# Patient Record
Sex: MALE | NOT HISPANIC OR LATINO
[De-identification: names, ages, dates, MRNs, and addresses within clinical notes are randomized per-mention and may not be internally consistent; named-entity substitution may affect disease eponyms.]

---

## 2017-11-16 ENCOUNTER — PRIOR ORIGINAL RECORDS (OUTPATIENT)
Dept: OTHER | Age: 77
End: 2017-11-16

## 2017-12-13 ENCOUNTER — HOSPITAL ENCOUNTER (OUTPATIENT)
Age: 77
Discharge: HOME | End: 2017-12-13
Attending: EMERGENCY MEDICINE
Payer: MEDICARE

## 2017-12-13 ENCOUNTER — APPOINTMENT (OUTPATIENT)
Dept: GENERAL RADIOLOGY | Age: 77
End: 2017-12-13
Attending: EMERGENCY MEDICINE
Payer: MEDICARE

## 2017-12-13 VITALS
SYSTOLIC BLOOD PRESSURE: 171 MMHG | DIASTOLIC BLOOD PRESSURE: 77 MMHG | TEMPERATURE: 101 F | HEART RATE: 103 BPM | OXYGEN SATURATION: 95 % | RESPIRATION RATE: 16 BRPM

## 2017-12-13 DIAGNOSIS — R05.9 COUGH: Primary | ICD-10-CM

## 2017-12-13 DIAGNOSIS — R50.9 FEVER, UNSPECIFIED FEVER CAUSE: ICD-10-CM

## 2017-12-13 PROCEDURE — 99213 OFFICE O/P EST LOW 20 MIN: CPT

## 2017-12-13 PROCEDURE — 99214 OFFICE O/P EST MOD 30 MIN: CPT

## 2017-12-13 PROCEDURE — 71020 XR CHEST PA + LAT CHEST (CPT=71020): CPT | Performed by: EMERGENCY MEDICINE

## 2017-12-13 RX ORDER — LORATADINE 10 MG/1
10 TABLET ORAL DAILY
COMMUNITY
End: 2019-05-30

## 2017-12-13 RX ORDER — PNV NO.95/FERROUS FUM/FOLIC AC 28MG-0.8MG
325 TABLET ORAL DAILY
COMMUNITY

## 2017-12-13 RX ORDER — ASPIRIN 81 MG/1
81 TABLET, CHEWABLE ORAL DAILY
COMMUNITY

## 2017-12-13 RX ORDER — BENZONATATE 200 MG/1
200 CAPSULE ORAL 3 TIMES DAILY PRN
Qty: 30 CAPSULE | Refills: 0 | Status: SHIPPED | OUTPATIENT
Start: 2017-12-13 | End: 2018-01-12

## 2017-12-13 RX ORDER — AMLODIPINE BESYLATE 5 MG/1
5 TABLET ORAL DAILY
COMMUNITY
End: 2018-04-05

## 2017-12-13 RX ORDER — ACETAMINOPHEN 160 MG
2000 TABLET,DISINTEGRATING ORAL DAILY
COMMUNITY

## 2017-12-13 RX ORDER — BUPRENORPHINE HCL 8 MG/1
1 TABLET SUBLINGUAL DAILY
COMMUNITY

## 2017-12-13 RX ORDER — DOXYCYCLINE HYCLATE 100 MG
100 TABLET ORAL 2 TIMES DAILY
Qty: 20 TABLET | Refills: 0 | Status: SHIPPED | OUTPATIENT
Start: 2017-12-13 | End: 2017-12-23

## 2017-12-13 NOTE — ED INITIAL ASSESSMENT (HPI)
Patient c/o cough for a few weeks. States yesterday he had trouble sleeping and developed a fever. Pt temp at home a little over 100. Temp today is 101.2 on arrival. Pt has not had any of his medications yet today and will take tylenol when he gets home.

## 2017-12-13 NOTE — ED NOTES
Pt lying on cart. States he does not want tylenol right now. He wants to take it with the rest of his pills when he gets home. Patient states he took 6 extra strength tylenol yesterday and it didn't help.  Pt given blanket, water, and update on plan of care

## 2017-12-13 NOTE — ED PROVIDER NOTES
Patient presents with:  Cough/URI  Fever    HPI:     Golden Simental is a 78year old male with hx of HTN, DVT who presents with chief complaint of cough, fever. Started with URI symptoms a few days ago. The cough has progressed and now productive. 12/13/2017  PROCEDURE:  XR CHEST PA + LAT CHEST (CPT=71020)  INDICATIONS:  sinus congestion  COMPARISON:  [facility], CHEST W/O CONTRAST, 4/27/2010, 8:33. TECHNIQUE:  PA and lateral chest radiographs were obtained.   PATIENT STATED HISTOR tessalon Rx  2. Supportive care - APAP  3. F/u with PCP in 2 days for re-evaluation, sooner if symptoms worsen      All results reviewed and discussed with patient. See AVS for detailed discharge instructions.

## 2017-12-31 ENCOUNTER — PRIOR ORIGINAL RECORDS (OUTPATIENT)
Dept: OTHER | Age: 77
End: 2017-12-31

## 2018-01-25 ENCOUNTER — PRIOR ORIGINAL RECORDS (OUTPATIENT)
Dept: OTHER | Age: 78
End: 2018-01-25

## 2018-02-20 ENCOUNTER — TELEPHONE (OUTPATIENT)
Dept: INTERNAL MEDICINE CLINIC | Facility: CLINIC | Age: 78
End: 2018-02-20

## 2018-02-20 NOTE — TELEPHONE ENCOUNTER
Lo Clam over 65   Received: Yesterday   Message Contents   Jason Ko RN   Phone Number: [phone]             Patients spouse called on behalf of patient.  Patient would like to be seen for weight loss clinic   #[phone]

## 2018-02-23 ENCOUNTER — HOSPITAL ENCOUNTER (OUTPATIENT)
Dept: CV DIAGNOSTICS | Age: 78
Discharge: HOME | End: 2018-02-23
Attending: FAMILY MEDICINE
Payer: MEDICARE

## 2018-02-23 DIAGNOSIS — E78.5 HYPERLIPIDEMIA, UNSPECIFIED: ICD-10-CM

## 2018-02-23 DIAGNOSIS — I10 ESSENTIAL (PRIMARY) HYPERTENSION: ICD-10-CM

## 2018-02-23 PROCEDURE — 93306 TTE W/DOPPLER COMPLETE: CPT | Performed by: FAMILY MEDICINE

## 2018-04-05 ENCOUNTER — LAB ENCOUNTER (OUTPATIENT)
Dept: LAB | Age: 78
End: 2018-04-05
Attending: INTERNAL MEDICINE
Payer: MEDICARE

## 2018-04-05 ENCOUNTER — OFFICE VISIT (OUTPATIENT)
Dept: INTERNAL MEDICINE CLINIC | Facility: CLINIC | Age: 78
End: 2018-04-05

## 2018-04-05 VITALS
HEART RATE: 98 BPM | DIASTOLIC BLOOD PRESSURE: 62 MMHG | BODY MASS INDEX: 31.39 KG/M2 | HEIGHT: 67 IN | RESPIRATION RATE: 16 BRPM | SYSTOLIC BLOOD PRESSURE: 138 MMHG | WEIGHT: 200 LBS

## 2018-04-05 DIAGNOSIS — R73.01 IFG (IMPAIRED FASTING GLUCOSE): ICD-10-CM

## 2018-04-05 DIAGNOSIS — I10 ESSENTIAL HYPERTENSION: ICD-10-CM

## 2018-04-05 DIAGNOSIS — E66.9 OBESITY (BMI 30-39.9): ICD-10-CM

## 2018-04-05 DIAGNOSIS — Z51.81 THERAPEUTIC DRUG MONITORING: Primary | ICD-10-CM

## 2018-04-05 PROBLEM — E78.5 HLD (HYPERLIPIDEMIA): Status: ACTIVE | Noted: 2018-04-05

## 2018-04-05 PROCEDURE — 99203 OFFICE O/P NEW LOW 30 MIN: CPT | Performed by: INTERNAL MEDICINE

## 2018-04-05 PROCEDURE — 83036 HEMOGLOBIN GLYCOSYLATED A1C: CPT

## 2018-04-05 RX ORDER — HYDROCHLOROTHIAZIDE 25 MG/1
TABLET ORAL
Refills: 0 | COMMUNITY
Start: 2018-03-14 | End: 2019-04-08

## 2018-04-05 RX ORDER — LISINOPRIL 40 MG/1
TABLET ORAL
Refills: 0 | COMMUNITY
Start: 2018-01-22 | End: 2019-01-14

## 2018-04-05 RX ORDER — SIMVASTATIN 40 MG
TABLET ORAL
COMMUNITY
Start: 2013-01-03 | End: 2018-04-05

## 2018-04-05 RX ORDER — METFORMIN HYDROCHLORIDE 750 MG/1
750 TABLET, EXTENDED RELEASE ORAL DAILY
Qty: 30 TABLET | Refills: 0 | Status: SHIPPED | OUTPATIENT
Start: 2018-04-05 | End: 2018-04-30

## 2018-04-05 RX ORDER — AMLODIPINE BESYLATE 10 MG/1
TABLET ORAL
Refills: 0 | COMMUNITY
Start: 2018-02-26 | End: 2019-04-16

## 2018-04-05 NOTE — PATIENT INSTRUCTIONS
Plan:  Start metformin with dinner   Follow up with me in 1 month  Schedule follow up appointments: Ray Meyer (dietitian) & Mono Castro (behavorial psychologist)    Please try to work on the following dietary changes:  1.  Drink lots of water and cut beverly

## 2018-04-05 NOTE — PROGRESS NOTES
HISTORY OF PRESENT ILLNESS  Patient presents with:  Weight Problem      Jaqui Cisneros is a 78year old male new to our office today for initiation of medical weight loss program.  Patient presents today with c/o excess weight.      Has gained weight denies thickening   LUNGS: denies shortness of breath with exertion, no apnea   CARDIOVASCULAR: denies chest pain on exertion , denies palpitations or pedal edema  GI: denies abdominal pain.   No N/V/D/C  MUSCULOSKELETAL: denies joint pains   NEURO: denies TSH 1.060 04/06/2012     No results found for: B12, VITB12  No results found for: VITD, QVITD, VITD25, OFWW20PJ      Current Outpatient Prescriptions on File Prior to Visit:  gabapentin 300 MG Oral Cap TK ONE C PO TID Disp:  Rfl: 2   Omeprazole 40 MG Ora contraindications: topamax/ history of one renal stone 30 yrs ago. Avoid sympathomimetics due to age. · Follow up with dietitian and psychologist as recommended. · Discussed the role of sleep and stress in weight management.   · Labs orders as above, pat life.           Return in about 4 weeks (around 5/3/2018) for weight management. Patient verbalizes understanding.     Suzanne Brito MD  4/5/2018

## 2018-04-11 ENCOUNTER — TELEPHONE (OUTPATIENT)
Dept: INTERNAL MEDICINE CLINIC | Facility: CLINIC | Age: 78
End: 2018-04-11

## 2018-04-30 RX ORDER — METFORMIN HYDROCHLORIDE 750 MG/1
750 TABLET, EXTENDED RELEASE ORAL DAILY
Qty: 30 TABLET | Refills: 0 | Status: SHIPPED | OUTPATIENT
Start: 2018-04-30 | End: 2018-05-01

## 2018-04-30 NOTE — TELEPHONE ENCOUNTER
Maegan:pt wife called just wanted to let Calista Carcamo know that pt will be out of metformin this wednesday and not sure if he needs to get a refill,or partial refill, or just be without it until next ov

## 2018-04-30 NOTE — TELEPHONE ENCOUNTER
Requesting MetFORMIN HCl  MG Oral Tablet 24 Hr  LOV: 04/05/2018  RTC: 1 month  Filled: 04/05/2018 #30 with 0 refills    Future Appointments  Date Time Provider Sadia Webb   5/15/2018 2:00 PM Jeffrey Lovell MD [address]   5/15/2018 2:45

## 2018-05-01 RX ORDER — METFORMIN HYDROCHLORIDE 750 MG/1
TABLET, EXTENDED RELEASE ORAL
Qty: 30 TABLET | Refills: 0 | Status: SHIPPED | OUTPATIENT
Start: 2018-05-01 | End: 2018-05-15

## 2018-05-15 ENCOUNTER — OFFICE VISIT (OUTPATIENT)
Dept: INTERNAL MEDICINE CLINIC | Facility: CLINIC | Age: 78
End: 2018-05-15

## 2018-05-15 VITALS
DIASTOLIC BLOOD PRESSURE: 60 MMHG | BODY MASS INDEX: 30.92 KG/M2 | SYSTOLIC BLOOD PRESSURE: 110 MMHG | WEIGHT: 197 LBS | HEIGHT: 67 IN | HEART RATE: 80 BPM | RESPIRATION RATE: 16 BRPM

## 2018-05-15 DIAGNOSIS — Z51.81 THERAPEUTIC DRUG MONITORING: Primary | ICD-10-CM

## 2018-05-15 DIAGNOSIS — E66.9 OBESITY (BMI 30-39.9): ICD-10-CM

## 2018-05-15 DIAGNOSIS — R73.01 IFG (IMPAIRED FASTING GLUCOSE): ICD-10-CM

## 2018-05-15 PROCEDURE — 99213 OFFICE O/P EST LOW 20 MIN: CPT | Performed by: INTERNAL MEDICINE

## 2018-05-15 RX ORDER — METFORMIN HYDROCHLORIDE 750 MG/1
TABLET, EXTENDED RELEASE ORAL
Qty: 30 TABLET | Refills: 1 | Status: SHIPPED | OUTPATIENT
Start: 2018-05-15 | End: 2018-07-19

## 2018-05-15 NOTE — PROGRESS NOTES
HISTORY OF PRESENT ILLNESS  Patient presents with:  Weight Check: down 3      Ace Masterson is a 78year old male here for follow up in medical weight loss program.     Denies chest pain, shortness of breath, dizziness, blurred vision, headache, pa Results  Component Value Date   CHOLEST 127 04/06/2012   TRIG 136 04/06/2012   HDL 26 (L) 04/06/2012   LDL 74 04/06/2012   VLDL 27 04/06/2012   TCHDLRATIO 4.9 04/06/2012       Lab Results  Component Value Date   TSH 1.060 04/06/2012     No results found fo carb diet/ recommended to eat breakfast daily/ regular protein intake  · Medication use and side effects reviewed with patient. · Follow up with dietitian and psychologist as recommended. · Discussed the role of sleep and stress in weight management.   ·

## 2018-05-17 ENCOUNTER — OFFICE VISIT (OUTPATIENT)
Dept: INTERNAL MEDICINE CLINIC | Facility: CLINIC | Age: 78
End: 2018-05-17

## 2018-05-17 DIAGNOSIS — E66.09 CLASS 1 OBESITY DUE TO EXCESS CALORIES WITH SERIOUS COMORBIDITY AND BODY MASS INDEX (BMI) OF 30.0 TO 30.9 IN ADULT: Primary | ICD-10-CM

## 2018-05-17 PROCEDURE — G0447 BEHAVIOR COUNSEL OBESITY 15M: HCPCS | Performed by: DIETITIAN, REGISTERED

## 2018-05-19 NOTE — PROGRESS NOTES
INITIAL OUTPATIENT NUTRITION CONSULTATION    Nutrition Assessment    Medical Diagnosis: Obesity  Client Age and Gender: 78year old male     Marital Status and Occupation: . Works.   Did not state profession    Problem List as of 5/17/2018 Review mg/dL Final   ----------    LDL CHOLESTROL   Date Value Ref Range Status   04/06/2012 74 <100 mg/dL Final   ----------    HDL CHOL   Date Value Ref Range Status   04/06/2012 26 (L) mg/dL Final   Comment:   <26    mg/dL  Highest CHD risk  25-35  mg/dL  High 45 minutes in consultation with the patient. Nutrition Intervention/Education:  Comprehensive nutrition education and evaluation provided for weight loss. Diet is poor quality due to excessive bread, pastries, desserts, and soda.   Pt states he has re

## 2018-05-29 RX ORDER — METFORMIN HYDROCHLORIDE 750 MG/1
TABLET, EXTENDED RELEASE ORAL
Qty: 30 TABLET | Refills: 0 | Status: SHIPPED | OUTPATIENT
Start: 2018-05-29 | End: 2018-07-19

## 2018-07-19 ENCOUNTER — OFFICE VISIT (OUTPATIENT)
Dept: INTERNAL MEDICINE CLINIC | Facility: CLINIC | Age: 78
End: 2018-07-19
Payer: MEDICARE

## 2018-07-19 VITALS
SYSTOLIC BLOOD PRESSURE: 132 MMHG | HEART RATE: 80 BPM | HEIGHT: 67 IN | DIASTOLIC BLOOD PRESSURE: 68 MMHG | RESPIRATION RATE: 16 BRPM | BODY MASS INDEX: 28.88 KG/M2 | WEIGHT: 184 LBS

## 2018-07-19 DIAGNOSIS — E66.9 OBESITY (BMI 30-39.9): ICD-10-CM

## 2018-07-19 DIAGNOSIS — R73.01 IFG (IMPAIRED FASTING GLUCOSE): ICD-10-CM

## 2018-07-19 DIAGNOSIS — Z51.81 THERAPEUTIC DRUG MONITORING: Primary | ICD-10-CM

## 2018-07-19 DIAGNOSIS — I10 ESSENTIAL HYPERTENSION: ICD-10-CM

## 2018-07-19 PROCEDURE — 99214 OFFICE O/P EST MOD 30 MIN: CPT | Performed by: INTERNAL MEDICINE

## 2018-07-19 RX ORDER — METFORMIN HYDROCHLORIDE 750 MG/1
TABLET, EXTENDED RELEASE ORAL
Qty: 90 TABLET | Refills: 0 | Status: SHIPPED | OUTPATIENT
Start: 2018-07-19 | End: 2018-10-04

## 2018-07-19 RX ORDER — METFORMIN HYDROCHLORIDE 750 MG/1
TABLET, EXTENDED RELEASE ORAL
Qty: 30 TABLET | Refills: 0 | OUTPATIENT
Start: 2018-07-19

## 2018-07-19 NOTE — PROGRESS NOTES
HISTORY OF PRESENT ILLNESS  Patient presents with:  Weight Check: down 13 lb      Sofiya Vega is a 78year old male here for follow up in medical weight loss program.     Denies chest pain, shortness of breath, dizziness, blurred vision, headache 04/06/2012    04/06/2012   CO2 28.0 04/06/2012       Lab Results  Component Value Date    04/05/2018   A1C 6.0 (H) 04/05/2018       Lab Results  Component Value Date   CHOLEST 127 04/06/2012   TRIG 136 04/06/2012   HDL 26 (L) 04/06/2012   LDL MG) BY MOUTH DAILY        PLAN  · Initial consult: 200 lbs, Body fat; 31.9%  · Lost 16 lbs  · Continue to dietary modifications/ metformin   · Continue metformin, doing well with tracking and protein intake  · We reviewed possible glp1 agonist addition  ·

## 2018-07-22 ENCOUNTER — HOSPITAL ENCOUNTER (OUTPATIENT)
Age: 78
Discharge: HOME | End: 2018-07-22
Attending: FAMILY MEDICINE
Payer: MEDICARE

## 2018-07-22 VITALS
TEMPERATURE: 98 F | WEIGHT: 184.38 LBS | OXYGEN SATURATION: 95 % | DIASTOLIC BLOOD PRESSURE: 74 MMHG | HEIGHT: 67 IN | BODY MASS INDEX: 28.94 KG/M2 | RESPIRATION RATE: 16 BRPM | SYSTOLIC BLOOD PRESSURE: 123 MMHG | HEART RATE: 76 BPM

## 2018-07-22 DIAGNOSIS — R68.89 FLU-LIKE SYMPTOMS: Primary | ICD-10-CM

## 2018-07-22 PROCEDURE — 99212 OFFICE O/P EST SF 10 MIN: CPT

## 2018-07-22 PROCEDURE — 99213 OFFICE O/P EST LOW 20 MIN: CPT

## 2018-07-22 RX ORDER — ACETAMINOPHEN 500 MG
1000 TABLET ORAL 3 TIMES DAILY
COMMUNITY

## 2018-07-22 NOTE — ED PROVIDER NOTES
Patient Seen in: [address] [facility]    History   Patient presents with:  Cough/URI  Runny Nose    Stated Complaint: cold-like symptoms    HPI    72-year-old male presents to the immediate care today with chief complaints of fever, chills, ch discharge, mild congestion, no sinus tenderness. No nasal flaring  Throat: lips, mucosa, and tongue normal; teeth and gums normal. Buccal mucosa moist.   Neck: no adenopathy  Lungs: clear to auscultation bilaterally. No chest wall retractions.  No respirato

## 2018-07-23 ENCOUNTER — OFFICE VISIT (OUTPATIENT)
Dept: INTERNAL MEDICINE CLINIC | Facility: CLINIC | Age: 78
End: 2018-07-23
Payer: MEDICARE

## 2018-07-23 VITALS — BODY MASS INDEX: 29 KG/M2 | WEIGHT: 185.63 LBS

## 2018-07-23 DIAGNOSIS — Z71.3 ENCOUNTER FOR WEIGHT LOSS COUNSELING: ICD-10-CM

## 2018-07-23 DIAGNOSIS — E66.3 OVERWEIGHT WITH BODY MASS INDEX (BMI) 25.0-29.9: Primary | ICD-10-CM

## 2018-07-23 PROCEDURE — G0447 BEHAVIOR COUNSEL OBESITY 15M: HCPCS | Performed by: DIETITIAN, REGISTERED

## 2018-07-23 NOTE — PROGRESS NOTES
FOLLOW UP NUTRITION CONSULTATION    Nutrition Assessment    Number of consultations with dietitian: 2    Height:  Ht Readings from Last 1 Encounters:  07/22/18 : 67\"      Weight:   Wt Readings from Last 2 Encounters:  07/23/18 : 185 lb 9.6 oz  07/22/18

## 2018-08-29 ENCOUNTER — LAB ENCOUNTER (OUTPATIENT)
Dept: LAB | Age: 78
End: 2018-08-29
Attending: FAMILY MEDICINE
Payer: MEDICARE

## 2018-08-29 DIAGNOSIS — E78.5 HYPERLIPEMIA: Primary | ICD-10-CM

## 2018-08-29 DIAGNOSIS — D64.9 ANEMIA, UNSPECIFIED: ICD-10-CM

## 2018-08-29 DIAGNOSIS — N40.0 BENIGN PROSTATIC HYPERPLASIA: ICD-10-CM

## 2018-08-29 DIAGNOSIS — I10 ESSENTIAL HYPERTENSION: ICD-10-CM

## 2018-08-29 LAB
ALBUMIN SERPL-MCNC: 3.9 G/DL (ref 3.5–4.8)
ALBUMIN/GLOB SERPL: 1.1 {RATIO} (ref 1–2)
ALP LIVER SERPL-CCNC: 79 U/L (ref 45–117)
ALT SERPL-CCNC: 26 U/L (ref 17–63)
ANION GAP SERPL CALC-SCNC: 9 MMOL/L (ref 0–18)
AST SERPL-CCNC: 17 U/L (ref 15–41)
BASOPHILS # BLD AUTO: 0.06 X10(3) UL (ref 0–0.1)
BASOPHILS NFR BLD AUTO: 0.6 %
BILIRUB SERPL-MCNC: 0.8 MG/DL (ref 0.1–2)
BUN BLD-MCNC: 29 MG/DL (ref 8–20)
BUN/CREAT SERPL: 24 (ref 10–20)
CALCIUM BLD-MCNC: 9.2 MG/DL (ref 8.3–10.3)
CHLORIDE SERPL-SCNC: 108 MMOL/L (ref 101–111)
CHOLEST SMN-MCNC: 110 MG/DL (ref ?–200)
CO2 SERPL-SCNC: 26 MMOL/L (ref 22–32)
CREAT BLD-MCNC: 1.21 MG/DL (ref 0.7–1.3)
DEPRECATED HBV CORE AB SER IA-ACNC: 718 NG/ML (ref 30–530)
EOSINOPHIL # BLD AUTO: 0.46 X10(3) UL (ref 0–0.3)
EOSINOPHIL NFR BLD AUTO: 4.6 %
ERYTHROCYTE [DISTWIDTH] IN BLOOD BY AUTOMATED COUNT: 15.8 % (ref 11.5–16)
GLOBULIN PLAS-MCNC: 3.4 G/DL (ref 2.5–4)
GLUCOSE BLD-MCNC: 99 MG/DL (ref 70–99)
HAV AB SERPL IA-ACNC: 650 PG/ML (ref 193–986)
HCT VFR BLD AUTO: 35.2 % (ref 37–53)
HDLC SERPL-MCNC: 27 MG/DL (ref 40–59)
HGB BLD-MCNC: 10.9 G/DL (ref 13–17)
IMMATURE GRANULOCYTE COUNT: 0.03 X10(3) UL (ref 0–1)
IMMATURE GRANULOCYTE RATIO %: 0.3 %
IRON SATURATION: 16 % (ref 20–50)
IRON: 51 UG/DL (ref 45–182)
LDLC SERPL CALC-MCNC: 49 MG/DL (ref ?–100)
LYMPHOCYTES # BLD AUTO: 1.75 X10(3) UL (ref 0.9–4)
LYMPHOCYTES NFR BLD AUTO: 17.4 %
M PROTEIN MFR SERPL ELPH: 7.3 G/DL (ref 6.1–8.3)
MCH RBC QN AUTO: 19.9 PG (ref 27–33.2)
MCHC RBC AUTO-ENTMCNC: 31 G/DL (ref 31–37)
MCV RBC AUTO: 64.4 FL (ref 80–99)
MONOCYTES # BLD AUTO: 0.98 X10(3) UL (ref 0.1–1)
MONOCYTES NFR BLD AUTO: 9.8 %
NEUTROPHIL ABS PRELIM: 6.76 X10 (3) UL (ref 1.3–6.7)
NEUTROPHILS # BLD AUTO: 6.76 X10(3) UL (ref 1.3–6.7)
NEUTROPHILS NFR BLD AUTO: 67.3 %
NONHDLC SERPL-MCNC: 83 MG/DL (ref ?–130)
OSMOLALITY SERPL CALC.SUM OF ELEC: 302 MOSM/KG (ref 275–295)
PLATELET # BLD AUTO: 249 10(3)UL (ref 150–450)
POTASSIUM SERPL-SCNC: 3.7 MMOL/L (ref 3.6–5.1)
RBC # BLD AUTO: 5.47 X10(6)UL (ref 3.8–5.8)
RED CELL DISTRIBUTION WIDTH-SD: 34.6 FL (ref 35.1–46.3)
SODIUM SERPL-SCNC: 143 MMOL/L (ref 136–144)
TOTAL IRON BINDING CAPACITY: 314 UG/DL (ref 240–450)
TRANSFERRIN SERPL-MCNC: 211 MG/DL (ref 200–360)
TRIGL SERPL-MCNC: 170 MG/DL (ref 30–149)
VLDLC SERPL CALC-MCNC: 34 MG/DL (ref 0–30)
WBC # BLD AUTO: 10 X10(3) UL (ref 4–13)

## 2018-08-29 PROCEDURE — 80061 LIPID PANEL: CPT

## 2018-08-29 PROCEDURE — 82728 ASSAY OF FERRITIN: CPT

## 2018-08-29 PROCEDURE — 80053 COMPREHEN METABOLIC PANEL: CPT

## 2018-08-29 PROCEDURE — 83550 IRON BINDING TEST: CPT

## 2018-08-29 PROCEDURE — 83540 ASSAY OF IRON: CPT

## 2018-08-29 PROCEDURE — 36415 COLL VENOUS BLD VENIPUNCTURE: CPT

## 2018-08-29 PROCEDURE — 85025 COMPLETE CBC W/AUTO DIFF WBC: CPT

## 2018-08-29 PROCEDURE — 82607 VITAMIN B-12: CPT

## 2018-10-04 ENCOUNTER — OFFICE VISIT (OUTPATIENT)
Dept: INTERNAL MEDICINE CLINIC | Facility: CLINIC | Age: 78
End: 2018-10-04
Payer: MEDICARE

## 2018-10-04 VITALS
HEART RATE: 78 BPM | WEIGHT: 178 LBS | HEIGHT: 67 IN | BODY MASS INDEX: 27.94 KG/M2 | RESPIRATION RATE: 16 BRPM | SYSTOLIC BLOOD PRESSURE: 134 MMHG | DIASTOLIC BLOOD PRESSURE: 80 MMHG

## 2018-10-04 DIAGNOSIS — E66.9 OBESITY (BMI 30-39.9): ICD-10-CM

## 2018-10-04 DIAGNOSIS — Z71.3 ENCOUNTER FOR WEIGHT LOSS COUNSELING: Primary | ICD-10-CM

## 2018-10-04 DIAGNOSIS — R73.01 IFG (IMPAIRED FASTING GLUCOSE): ICD-10-CM

## 2018-10-04 DIAGNOSIS — I10 ESSENTIAL HYPERTENSION: ICD-10-CM

## 2018-10-04 DIAGNOSIS — E78.49 OTHER HYPERLIPIDEMIA: ICD-10-CM

## 2018-10-04 PROCEDURE — 99214 OFFICE O/P EST MOD 30 MIN: CPT | Performed by: INTERNAL MEDICINE

## 2018-10-04 RX ORDER — METFORMIN HYDROCHLORIDE 750 MG/1
TABLET, EXTENDED RELEASE ORAL
Qty: 90 TABLET | Refills: 0 | Status: SHIPPED | OUTPATIENT
Start: 2018-10-04 | End: 2018-12-06

## 2018-10-04 NOTE — PROGRESS NOTES
HISTORY OF PRESENT ILLNESS  Patient presents with:  Weight Check: down 6 lb      Sameer Castanon is a 70year old male here for follow up in medical weight loss program.     Denies chest pain, shortness of breath, dizziness, blurred vision, headache, ALT 26 08/29/2018    BILT 0.8 08/29/2018    TP 7.3 08/29/2018    ALB 3.9 08/29/2018    GLOBULIN 3.4 08/29/2018     08/29/2018    K 3.7 08/29/2018     08/29/2018    CO2 26.0 08/29/2018     Lab Results   Component Value Date     04/05/2018 on file prior to visit. ASSESSMENT  Analyzed weight data:       Diagnoses and all orders for this visit:    Encounter for weight loss counseling    Obesity (BMI 30-39. 9)    IFG (impaired fasting glucose)    Essential hypertension    Other hyperlipidemi

## 2018-11-19 ENCOUNTER — OFFICE VISIT (OUTPATIENT)
Dept: INTERNAL MEDICINE CLINIC | Facility: CLINIC | Age: 78
End: 2018-11-19
Payer: MEDICARE

## 2018-11-19 VITALS
RESPIRATION RATE: 16 BRPM | HEIGHT: 67 IN | TEMPERATURE: 98 F | WEIGHT: 178 LBS | SYSTOLIC BLOOD PRESSURE: 130 MMHG | DIASTOLIC BLOOD PRESSURE: 60 MMHG | BODY MASS INDEX: 27.94 KG/M2 | HEART RATE: 84 BPM

## 2018-11-19 DIAGNOSIS — H61.92 SKIN LESION OF LEFT EAR: ICD-10-CM

## 2018-11-19 DIAGNOSIS — R73.01 IFG (IMPAIRED FASTING GLUCOSE): ICD-10-CM

## 2018-11-19 DIAGNOSIS — E78.5 DYSLIPIDEMIA: Primary | ICD-10-CM

## 2018-11-19 DIAGNOSIS — K21.9 GASTROESOPHAGEAL REFLUX DISEASE WITHOUT ESOPHAGITIS: ICD-10-CM

## 2018-11-19 DIAGNOSIS — D56.8 OTHER THALASSEMIA (HCC): ICD-10-CM

## 2018-11-19 DIAGNOSIS — M79.604 RIGHT LEG PAIN: ICD-10-CM

## 2018-11-19 DIAGNOSIS — D50.9 CHRONIC IRON DEFICIENCY ANEMIA: ICD-10-CM

## 2018-11-19 DIAGNOSIS — I10 ESSENTIAL HYPERTENSION: ICD-10-CM

## 2018-11-19 PROCEDURE — 99214 OFFICE O/P EST MOD 30 MIN: CPT | Performed by: INTERNAL MEDICINE

## 2018-11-19 NOTE — PROGRESS NOTES
Sofiya Vega is a 70year old male. Patient presents with:  Establish Care: LG. Room 4.  HTN, high cholesterol, leg pain, skin lesion      HPI:     Patient with HTN, HL, obesity, pre dm, lumbar radiculopathy causing intermittent right leg pain, BP 40 MG Oral Capsule Delayed Release TAKE 1 CAPSULE BY MOUTH TWICE DAILY Disp: 60 capsule Rfl: 11   Vitamin D3 2000 units Oral Cap Take 2,000 Units by mouth daily.  Disp:  Rfl:    Multiple Vitamins-Minerals (CENTRUM SILVER) Oral Tab Take 1 tablet by mouth santa nourished,in no apparent distress  SKIN: left ear with  HEENT: atraumatic, normocephalic  NECK: supple,no adenopathy  LUNGS: normal rate without respiratory distress, lungs clear to auscultation  CARDIO: RRR nl S1 S2  GI: normal bowel sounds, soft, NT/ND

## 2018-12-06 ENCOUNTER — OFFICE VISIT (OUTPATIENT)
Dept: INTERNAL MEDICINE CLINIC | Facility: CLINIC | Age: 78
End: 2018-12-06
Payer: MEDICARE

## 2018-12-06 VITALS
HEART RATE: 78 BPM | RESPIRATION RATE: 16 BRPM | WEIGHT: 173 LBS | HEIGHT: 67 IN | BODY MASS INDEX: 27.15 KG/M2 | SYSTOLIC BLOOD PRESSURE: 122 MMHG | DIASTOLIC BLOOD PRESSURE: 70 MMHG

## 2018-12-06 DIAGNOSIS — E66.9 OBESITY (BMI 30-39.9): ICD-10-CM

## 2018-12-06 DIAGNOSIS — Z71.3 ENCOUNTER FOR WEIGHT LOSS COUNSELING: ICD-10-CM

## 2018-12-06 DIAGNOSIS — R73.01 IFG (IMPAIRED FASTING GLUCOSE): Primary | ICD-10-CM

## 2018-12-06 PROCEDURE — 99213 OFFICE O/P EST LOW 20 MIN: CPT | Performed by: INTERNAL MEDICINE

## 2018-12-06 RX ORDER — METFORMIN HYDROCHLORIDE 750 MG/1
TABLET, EXTENDED RELEASE ORAL
Qty: 90 TABLET | Refills: 0 | Status: SHIPPED | OUTPATIENT
Start: 2018-12-06 | End: 2019-02-28

## 2018-12-06 NOTE — PROGRESS NOTES
HISTORY OF PRESENT ILLNESS  Patient presents with:  Weight Check: down 5 lb      Lisandro Crawford is a 70year old male here for follow up in medical weight loss program.     Denies chest pain, shortness of breath, dizziness, blurred vision, headache, GLOBULIN 3.4 08/29/2018     08/29/2018    K 3.7 08/29/2018     08/29/2018    CO2 26.0 08/29/2018     Lab Results   Component Value Date     04/05/2018    A1C 6.0 (H) 04/05/2018     Lab Results   Component Value Date    CHOLEST 110 08/29/ visit:    IFG (impaired fasting glucose)    Encounter for weight loss counseling    Obesity (BMI 30-39. 9)    Other orders  -     MetFORMIN HCl  MG Oral Tablet 24 Hr; TAKE 1 TABLET(750 MG) BY MOUTH DAILY        PLAN  · Initial consult: 200 lbs, Body f

## 2018-12-07 ENCOUNTER — OFFICE VISIT (OUTPATIENT)
Dept: SURGERY | Facility: CLINIC | Age: 78
End: 2018-12-07
Payer: MEDICARE

## 2018-12-07 VITALS
DIASTOLIC BLOOD PRESSURE: 66 MMHG | WEIGHT: 173 LBS | BODY MASS INDEX: 27.15 KG/M2 | HEIGHT: 67 IN | HEART RATE: 75 BPM | SYSTOLIC BLOOD PRESSURE: 138 MMHG | TEMPERATURE: 98 F

## 2018-12-07 DIAGNOSIS — N40.1 BENIGN PROSTATIC HYPERPLASIA WITH LOWER URINARY TRACT SYMPTOMS, SYMPTOM DETAILS UNSPECIFIED: Primary | ICD-10-CM

## 2018-12-07 PROCEDURE — 99204 OFFICE O/P NEW MOD 45 MIN: CPT | Performed by: UROLOGY

## 2018-12-07 NOTE — PROGRESS NOTES
SUBJECTIVE:  Kimberly Hess is a 70year old male who presents for a consultation at the request of, and a copy of this note will be sent to, Dr. Nathalie Kendall, for evaluation of  benign prostatic hyperplasia. He states that the problem is unchanged.  Sym or 2      Binge frequency: Never      Comment: Cage done 11-19-18    Drug use: No           REVIEW OF SYSTEMS:  RESPIRATORY:  Negative for chest tightness, wheezing, cough, shortness of breath,  sputum production,chest pain or pleurisy.   CARDIOVASCULAR:  N assessment of PVR prior to next visit in 6 weeks. He and wife understand plan and agree.   Will need to perform clean intermittent catheterization with a 16 F coude tipped archer catheter indefinitely due to patient not being able to place a straight cathet

## 2018-12-10 ENCOUNTER — HOSPITAL ENCOUNTER (OUTPATIENT)
Dept: ULTRASOUND IMAGING | Age: 78
Discharge: HOME | End: 2018-12-10
Attending: UROLOGY
Payer: MEDICARE

## 2018-12-10 DIAGNOSIS — N40.1 BENIGN PROSTATIC HYPERPLASIA WITH LOWER URINARY TRACT SYMPTOMS, SYMPTOM DETAILS UNSPECIFIED: ICD-10-CM

## 2018-12-10 PROCEDURE — 76770 US EXAM ABDO BACK WALL COMP: CPT | Performed by: UROLOGY

## 2018-12-19 RX ORDER — GABAPENTIN 300 MG/1
CAPSULE ORAL
Qty: 90 CAPSULE | Refills: 2 | Status: SHIPPED | OUTPATIENT
Start: 2018-12-19 | End: 2019-03-16

## 2018-12-19 NOTE — TELEPHONE ENCOUNTER
LFV: 11/19/18 to establish care with TB  Future Appt: none on file  Last Rx:never filled by our office  Last Labs:8/29/18 ferritin, cmp, vitamin B12  Per protocol to provider

## 2018-12-19 NOTE — TELEPHONE ENCOUNTER
angel called asking if we got the fax they sent the other day to fill gabapentin 300 MG Oral Cap for this patient-we have not prescribed this for him before-was getting rx from a . Uyen Chacko would be new for TB

## 2018-12-27 ENCOUNTER — TELEPHONE (OUTPATIENT)
Dept: SURGERY | Facility: CLINIC | Age: 78
End: 2018-12-27

## 2018-12-31 RX ORDER — METFORMIN HYDROCHLORIDE 750 MG/1
TABLET, EXTENDED RELEASE ORAL
Qty: 90 TABLET | Refills: 0 | OUTPATIENT
Start: 2018-12-31

## 2018-12-31 NOTE — TELEPHONE ENCOUNTER
Patient's last office visit = 12/7/18; for BPH. See 12/10/18 kidney US results for MD's recommendation. Spoke to patient's wife (see FYI).   Patient's wife states  will need some more catheters for CIC, would like the ones he is has now; BARD 1

## 2018-12-31 NOTE — TELEPHONE ENCOUNTER
Requesting Metformin ER  LOV: 12/6/18  RTC: 3 months  Last Relevant Labs: 8/29/18  Filled: 12/6/18 #90 with 0 refills    Future Appointments   Date Time Provider Sadia Webb   1/24/2019  8:00 AM Ramiro Uribe MD [facility]   2/27/2019  7:30 AM

## 2019-01-14 RX ORDER — LISINOPRIL 40 MG/1
TABLET ORAL
Qty: 90 TABLET | Refills: 0 | Status: SHIPPED | OUTPATIENT
Start: 2019-01-14 | End: 2019-03-07

## 2019-01-17 NOTE — TELEPHONE ENCOUNTER
Matthew from [address] [place], [place] [address]cal came in and dropped off the CIC cath order and informed that JOAN needs to addendum his progress note of 12/7/18 to state that pt will continue to perform self cath 2 times daily, using a 16 FR coude tipped catheter d/t pt's inability t

## 2019-01-18 NOTE — TELEPHONE ENCOUNTER
I placed the [employer] script for pt's CIC caths on [facility] desk for a signature and when returned I will fax it back when signed.

## 2019-01-22 ENCOUNTER — HOSPITAL ENCOUNTER (OUTPATIENT)
Age: 79
Discharge: HOME | End: 2019-01-22
Attending: FAMILY MEDICINE
Payer: MEDICARE

## 2019-01-22 ENCOUNTER — APPOINTMENT (OUTPATIENT)
Dept: GENERAL RADIOLOGY | Age: 79
End: 2019-01-22
Attending: FAMILY MEDICINE
Payer: MEDICARE

## 2019-01-22 VITALS
TEMPERATURE: 98 F | HEART RATE: 83 BPM | DIASTOLIC BLOOD PRESSURE: 57 MMHG | SYSTOLIC BLOOD PRESSURE: 146 MMHG | OXYGEN SATURATION: 97 % | RESPIRATION RATE: 18 BRPM

## 2019-01-22 DIAGNOSIS — J18.9 WALKING PNEUMONIA: Primary | ICD-10-CM

## 2019-01-22 LAB
POCT INFLUENZA A: NEGATIVE
POCT INFLUENZA B: NEGATIVE

## 2019-01-22 PROCEDURE — 87502 INFLUENZA DNA AMP PROBE: CPT | Performed by: FAMILY MEDICINE

## 2019-01-22 PROCEDURE — 99214 OFFICE O/P EST MOD 30 MIN: CPT

## 2019-01-22 PROCEDURE — 71046 X-RAY EXAM CHEST 2 VIEWS: CPT | Performed by: FAMILY MEDICINE

## 2019-01-22 PROCEDURE — 99213 OFFICE O/P EST LOW 20 MIN: CPT

## 2019-01-22 RX ORDER — CEFDINIR 300 MG/1
300 CAPSULE ORAL 2 TIMES DAILY
Qty: 20 CAPSULE | Refills: 0 | Status: SHIPPED | OUTPATIENT
Start: 2019-01-22 | End: 2019-02-01

## 2019-01-22 RX ORDER — AZITHROMYCIN 250 MG/1
TABLET, FILM COATED ORAL
Qty: 1 PACKAGE | Refills: 0 | Status: SHIPPED | OUTPATIENT
Start: 2019-01-22 | End: 2019-01-27

## 2019-01-22 NOTE — ED INITIAL ASSESSMENT (HPI)
Pt states since Sunday cough and chills. States the cough is not as bad but today he feels listless and very tired. No fever or shortness of breath. Denies any pain.

## 2019-01-22 NOTE — ED PROVIDER NOTES
Patient Seen in: [address] [facility]    History   Patient presents with:  Cough/URI    Stated Complaint: COUGHING    HPI  This is a 53-year-old male, fairly healthy, has not received his pneumonia shot or his flu shot this season, now coming Pulse 83   Temp 98.2 °F (36.8 °C) (Temporal)   Resp 18   SpO2 97%         Physical Exam    GEN: Not in any acute distress, making good conversation, answering appropriately   SKIN: No pallor, no erythema, no cyanosis, warm and dry  Eyes: wnl, normal conjun and very tired. No fever or shortness of  breath. Denies any pain. FINDINGS:  There is some subtle right basilar opacity which may represent an area of early consolidation. Followup is recommended to ensure resolution.   Chronic appearing right rib fra Disp-1 Package, R-0

## 2019-01-24 ENCOUNTER — OFFICE VISIT (OUTPATIENT)
Dept: SURGERY | Facility: CLINIC | Age: 79
End: 2019-01-24
Payer: MEDICARE

## 2019-01-24 VITALS
DIASTOLIC BLOOD PRESSURE: 64 MMHG | HEIGHT: 67 IN | BODY MASS INDEX: 27.15 KG/M2 | SYSTOLIC BLOOD PRESSURE: 148 MMHG | WEIGHT: 173 LBS

## 2019-01-24 DIAGNOSIS — N40.1 BENIGN PROSTATIC HYPERPLASIA WITH LOWER URINARY TRACT SYMPTOMS, SYMPTOM DETAILS UNSPECIFIED: Primary | ICD-10-CM

## 2019-01-24 PROCEDURE — 99213 OFFICE O/P EST LOW 20 MIN: CPT | Performed by: UROLOGY

## 2019-01-24 PROCEDURE — G0463 HOSPITAL OUTPT CLINIC VISIT: HCPCS | Performed by: UROLOGY

## 2019-01-24 NOTE — PROGRESS NOTES
Tasia Pacheco is a 70year old male. HPI:   Patient presents with: Follow - Up: patient presents for f/u of u/s results      53-year-old male in follow-up to a previous visit December 7, 2018.   He has a complex previous history of BPH and eleva Z-KYLIE) 250 MG Oral Tab 500 mg once followed by 250 mg daily x 4 days Disp: 1 Package Rfl: 0   LISINOPRIL 40 MG Oral Tab TAKE 1 TABLET BY MOUTH EVERY DAY Disp: 90 tablet Rfl: 0   gabapentin 300 MG Oral Cap TK ONE C PO TID Disp: 90 capsule Rfl: 2   MetFORMIN Visit:  No orders of the defined types were placed in this encounter.       Meds This Visit:  Requested Prescriptions      No prescriptions requested or ordered in this encounter       Imaging & Referrals:  None     1/24/2019  Kelsi Jones MD

## 2019-02-07 ENCOUNTER — LAB ENCOUNTER (OUTPATIENT)
Dept: LAB | Age: 79
End: 2019-02-07
Attending: INTERNAL MEDICINE
Payer: MEDICARE

## 2019-02-07 DIAGNOSIS — E78.5 DYSLIPIDEMIA: ICD-10-CM

## 2019-02-07 DIAGNOSIS — D56.8 OTHER THALASSEMIA (HCC): ICD-10-CM

## 2019-02-07 DIAGNOSIS — R73.01 IFG (IMPAIRED FASTING GLUCOSE): ICD-10-CM

## 2019-02-07 DIAGNOSIS — D50.9 CHRONIC IRON DEFICIENCY ANEMIA: ICD-10-CM

## 2019-02-07 DIAGNOSIS — I10 ESSENTIAL HYPERTENSION: ICD-10-CM

## 2019-02-07 LAB
ALBUMIN SERPL-MCNC: 3.7 G/DL (ref 3.1–4.5)
ALBUMIN/GLOB SERPL: 1 {RATIO} (ref 1–2)
ALP LIVER SERPL-CCNC: 63 U/L (ref 45–117)
ALT SERPL-CCNC: 19 U/L (ref 17–63)
ANION GAP SERPL CALC-SCNC: 7 MMOL/L (ref 0–18)
AST SERPL-CCNC: 14 U/L (ref 15–41)
BASOPHILS # BLD AUTO: 0.07 X10(3) UL (ref 0–0.2)
BASOPHILS NFR BLD AUTO: 0.8 %
BILIRUB SERPL-MCNC: 1 MG/DL (ref 0.1–2)
BUN BLD-MCNC: 28 MG/DL (ref 8–20)
BUN/CREAT SERPL: 24.6 (ref 10–20)
CALCIUM BLD-MCNC: 9 MG/DL (ref 8.3–10.3)
CHLORIDE SERPL-SCNC: 109 MMOL/L (ref 101–111)
CHOLEST SMN-MCNC: 117 MG/DL (ref ?–200)
CO2 SERPL-SCNC: 27 MMOL/L (ref 22–32)
CREAT BLD-MCNC: 1.14 MG/DL (ref 0.7–1.3)
DEPRECATED RDW RBC AUTO: 34.9 FL (ref 35.1–46.3)
EOSINOPHIL # BLD AUTO: 1.13 X10(3) UL (ref 0–0.7)
EOSINOPHIL NFR BLD AUTO: 12.4 %
ERYTHROCYTE [DISTWIDTH] IN BLOOD BY AUTOMATED COUNT: 15.7 % (ref 11–15)
EST. AVERAGE GLUCOSE BLD GHB EST-MCNC: 117 MG/DL (ref 68–126)
GLOBULIN PLAS-MCNC: 3.7 G/DL (ref 2.8–4.4)
GLUCOSE BLD-MCNC: 102 MG/DL (ref 70–99)
HBA1C MFR BLD HPLC: 5.7 % (ref ?–5.7)
HCT VFR BLD AUTO: 34.6 % (ref 39–53)
HDLC SERPL-MCNC: 29 MG/DL (ref 40–59)
HGB BLD-MCNC: 10.8 G/DL (ref 13–17.5)
IMM GRANULOCYTES # BLD AUTO: 0.02 X10(3) UL (ref 0–1)
IMM GRANULOCYTES NFR BLD: 0.2 %
LDLC SERPL CALC-MCNC: 57 MG/DL (ref ?–100)
LYMPHOCYTES # BLD AUTO: 1.81 X10(3) UL (ref 1–4)
LYMPHOCYTES NFR BLD AUTO: 19.9 %
M PROTEIN MFR SERPL ELPH: 7.4 G/DL (ref 6.4–8.2)
MCH RBC QN AUTO: 20.3 PG (ref 26–34)
MCHC RBC AUTO-ENTMCNC: 31.2 G/DL (ref 31–37)
MCV RBC AUTO: 64.9 FL (ref 80–100)
MONOCYTES # BLD AUTO: 0.84 X10(3) UL (ref 0.1–1)
MONOCYTES NFR BLD AUTO: 9.2 %
NEUTROPHILS # BLD AUTO: 5.23 X10 (3) UL (ref 1.5–7.7)
NEUTROPHILS # BLD AUTO: 5.23 X10(3) UL (ref 1.5–7.7)
NEUTROPHILS NFR BLD AUTO: 57.5 %
NONHDLC SERPL-MCNC: 88 MG/DL (ref ?–130)
OSMOLALITY SERPL CALC.SUM OF ELEC: 302 MOSM/KG (ref 275–295)
PLATELET # BLD AUTO: 272 10(3)UL (ref 150–450)
POTASSIUM SERPL-SCNC: 3.7 MMOL/L (ref 3.6–5.1)
RBC # BLD AUTO: 5.33 X10(6)UL (ref 3.8–5.8)
SODIUM SERPL-SCNC: 143 MMOL/L (ref 136–144)
TRIGL SERPL-MCNC: 156 MG/DL (ref 30–149)
VLDLC SERPL CALC-MCNC: 31 MG/DL (ref 0–30)
WBC # BLD AUTO: 9.1 X10(3) UL (ref 4–11)

## 2019-02-07 PROCEDURE — 80061 LIPID PANEL: CPT

## 2019-02-07 PROCEDURE — 83036 HEMOGLOBIN GLYCOSYLATED A1C: CPT

## 2019-02-07 PROCEDURE — 85025 COMPLETE CBC W/AUTO DIFF WBC: CPT

## 2019-02-07 PROCEDURE — 80053 COMPREHEN METABOLIC PANEL: CPT

## 2019-02-28 ENCOUNTER — OFFICE VISIT (OUTPATIENT)
Dept: INTERNAL MEDICINE CLINIC | Facility: CLINIC | Age: 79
End: 2019-02-28
Payer: MEDICARE

## 2019-02-28 VITALS
RESPIRATION RATE: 20 BRPM | SYSTOLIC BLOOD PRESSURE: 142 MMHG | HEART RATE: 80 BPM | WEIGHT: 172.19 LBS | DIASTOLIC BLOOD PRESSURE: 60 MMHG | BODY MASS INDEX: 27.02 KG/M2 | HEIGHT: 67 IN

## 2019-02-28 DIAGNOSIS — Z51.81 THERAPEUTIC DRUG MONITORING: ICD-10-CM

## 2019-02-28 DIAGNOSIS — E66.9 OBESITY (BMI 30-39.9): Primary | ICD-10-CM

## 2019-02-28 PROCEDURE — 99213 OFFICE O/P EST LOW 20 MIN: CPT | Performed by: INTERNAL MEDICINE

## 2019-02-28 RX ORDER — METFORMIN HYDROCHLORIDE 750 MG/1
TABLET, EXTENDED RELEASE ORAL
Qty: 180 TABLET | Refills: 0 | Status: SHIPPED | OUTPATIENT
Start: 2019-02-28 | End: 2019-05-30

## 2019-02-28 NOTE — PROGRESS NOTES
HISTORY OF PRESENT ILLNESS  Patient presents with:  Weight Check: down 1#      Derrick Hurst is a 70year old male here for follow up in medical weight loss program.     Denies chest pain, shortness of breath, dizziness, blurred vision, headache, p 3.7 02/07/2019     02/07/2019    K 3.7 02/07/2019     02/07/2019    CO2 27.0 02/07/2019     Lab Results   Component Value Date     02/07/2019    A1C 5.7 (H) 02/07/2019     Lab Results   Component Value Date    CHOLEST 117 02/07/2019    T facility-administered medications on file prior to visit. ASSESSMENT  Analyzed weight data:       Diagnoses and all orders for this visit:    Obesity (BMI 30-39. 9)    Therapeutic drug monitoring    Other orders  -     metFORMIN HCl  MG Oral Table

## 2019-03-01 ENCOUNTER — OFFICE VISIT (OUTPATIENT)
Dept: INTERNAL MEDICINE CLINIC | Facility: CLINIC | Age: 79
End: 2019-03-01
Payer: MEDICARE

## 2019-03-01 VITALS
HEIGHT: 67 IN | DIASTOLIC BLOOD PRESSURE: 72 MMHG | HEART RATE: 72 BPM | BODY MASS INDEX: 28.09 KG/M2 | SYSTOLIC BLOOD PRESSURE: 138 MMHG | WEIGHT: 179 LBS | RESPIRATION RATE: 14 BRPM

## 2019-03-01 DIAGNOSIS — R39.14 BENIGN PROSTATIC HYPERPLASIA WITH INCOMPLETE BLADDER EMPTYING: ICD-10-CM

## 2019-03-01 DIAGNOSIS — K21.9 GASTROESOPHAGEAL REFLUX DISEASE WITHOUT ESOPHAGITIS: ICD-10-CM

## 2019-03-01 DIAGNOSIS — I10 ESSENTIAL HYPERTENSION: ICD-10-CM

## 2019-03-01 DIAGNOSIS — R73.01 IFG (IMPAIRED FASTING GLUCOSE): ICD-10-CM

## 2019-03-01 DIAGNOSIS — N40.1 BENIGN PROSTATIC HYPERPLASIA WITH INCOMPLETE BLADDER EMPTYING: ICD-10-CM

## 2019-03-01 DIAGNOSIS — E78.49 OTHER HYPERLIPIDEMIA: ICD-10-CM

## 2019-03-01 DIAGNOSIS — Z00.00 ENCOUNTER FOR ANNUAL HEALTH EXAMINATION: Primary | ICD-10-CM

## 2019-03-01 PROCEDURE — G0439 PPPS, SUBSEQ VISIT: HCPCS | Performed by: INTERNAL MEDICINE

## 2019-03-01 NOTE — PROGRESS NOTES
HPI:   Jose Guadalupe Jarrell is a 70year old male who presents for a Medicare Subsequent Annual Wellness visit (Pt already had Initial Annual Wellness). Patient here for wellness, feels great.    Had left ear SCC removed, will be following up with yary 0 so is at low risk.      Patient Care Team: Patient Care Team:  Zander Estrada MD as PCP - General (Internal Medicine)  Adelina Giron MD (Internal Medicine)  Brigida Pagan RD (Dietitian)    Patient Active Problem List:     Gastroesophageal reflux disease wi Chew by mouth daily. loratadine 10 MG Oral Tab Take 10 mg by mouth daily. Ferrous Sulfate (IRON) 325 (65 Fe) MG Oral Tab Take by mouth. Psyllium (METAMUCIL OR) Take by mouth daily. finasteride (PROSCAR) 5 MG Oral Tab Take 5 mg by mouth daily.    s Symmetric, no curvature, ROM normal, no CVA tenderness   Lungs:   Clear to auscultation bilaterally, respirations unlabored   Chest Wall:  No tenderness or deformity   Heart:  Regular rate and rhythm, S1, S2 normal   Abdomen:   Soft, non-tender, bowel soun been poor?: No  How does the patient maintain a good energy level?: Appropriate Exercise  How would you describe your daily physical activity?: Moderate  How would you describe your current health state?: Good  How do you maintain positive mental well-bein vaccine history found Please get once after your 65th birthday    Hepatitis B for Moderate/High Risk No vaccine history found Medium/high risk factors:   End-stage renal disease   Hemophiliacs who received Factor VIII or IX concentrates   Clients of instit

## 2019-03-01 NOTE — PATIENT INSTRUCTIONS
Mik Valero's SCREENING SCHEDULE   Tests on this list are recommended by your physician but may not be covered, or covered at this frequency, by your insurer. Please check with your insurance carrier before scheduling to verify coverage.     PRE criteria:   • Men who are 73-68 years old and have smoked more than 100 cigarettes in their lifetime   • Anyone with a family history    Colorectal Cancer Screening Covered up to Age 76     Colonoscopy Screen   Covered every 10 years- more often if abnorma house as a HepB virus carrier   Homosexual men   Illicit injectable drug abusers     Tetanus Toxoid- Only covered with a cut with metal- TD and TDaP Not covered by Medicare Part B) No orders found for this or any previous visit.  This may be covered with yo

## 2019-03-06 ENCOUNTER — PATIENT MESSAGE (OUTPATIENT)
Dept: INTERNAL MEDICINE CLINIC | Facility: CLINIC | Age: 79
End: 2019-03-06

## 2019-03-07 RX ORDER — SIMVASTATIN 40 MG
TABLET ORAL
Qty: 90 TABLET | Refills: 1 | Status: SHIPPED | OUTPATIENT
Start: 2019-03-07 | End: 2019-09-09

## 2019-03-07 RX ORDER — LISINOPRIL 40 MG/1
TABLET ORAL
Qty: 90 TABLET | Refills: 1 | Status: SHIPPED | OUTPATIENT
Start: 2019-03-07 | End: 2019-08-31

## 2019-03-07 NOTE — TELEPHONE ENCOUNTER
He has chronic stable anemia so OK if not taking iron  WBC is ok so not really worried about eosinophils. These can be high sometimes with allergies.   Can discuss further with patient in OV

## 2019-03-16 RX ORDER — GABAPENTIN 300 MG/1
CAPSULE ORAL
Qty: 270 CAPSULE | Refills: 1 | Status: SHIPPED | OUTPATIENT
Start: 2019-03-16 | End: 2019-09-09

## 2019-03-16 NOTE — TELEPHONE ENCOUNTER
LOV: 3/1/18 w/ TB for CPE  FOV: 9/3/19  Last labs: 2/7/19 A1C,CBC,CMP,LIPID  Last Refill: 12/19/18 qt:90 2 refills    Per protocol routed to provider

## 2019-03-17 ENCOUNTER — PATIENT MESSAGE (OUTPATIENT)
Dept: INTERNAL MEDICINE CLINIC | Facility: CLINIC | Age: 79
End: 2019-03-17

## 2019-04-09 RX ORDER — HYDROCHLOROTHIAZIDE 25 MG/1
TABLET ORAL
Qty: 90 TABLET | Refills: 3 | Status: SHIPPED | OUTPATIENT
Start: 2019-04-09 | End: 2019-07-02

## 2019-04-12 ENCOUNTER — TELEPHONE (OUTPATIENT)
Dept: SURGERY | Facility: CLINIC | Age: 79
End: 2019-04-12

## 2019-04-12 ENCOUNTER — APPOINTMENT (OUTPATIENT)
Dept: LAB | Age: 79
End: 2019-04-12
Attending: UROLOGY
Payer: MEDICARE

## 2019-04-12 DIAGNOSIS — R31.0 HEMATURIA, GROSS: Primary | ICD-10-CM

## 2019-04-12 DIAGNOSIS — R31.0 HEMATURIA, GROSS: ICD-10-CM

## 2019-04-12 PROCEDURE — 87086 URINE CULTURE/COLONY COUNT: CPT

## 2019-04-12 PROCEDURE — 81001 URINALYSIS AUTO W/SCOPE: CPT

## 2019-04-12 PROCEDURE — 87186 SC STD MICRODIL/AGAR DIL: CPT

## 2019-04-12 PROCEDURE — 87077 CULTURE AEROBIC IDENTIFY: CPT

## 2019-04-12 NOTE — TELEPHONE ENCOUNTER
Pt phoned back and call was transferred . Spoke with pt. He states he caths himself twice a day, and noted that lately he has been having difficulty passing his catheter. States Tuesday night, he was up urinating frequently.  Wednesday his urine had blood i

## 2019-04-12 NOTE — TELEPHONE ENCOUNTER
Pt states he is having a hard time self catheterizing since 4/9 and feels like he has been unable to empty his bladder.

## 2019-04-12 NOTE — TELEPHONE ENCOUNTER
[facility] When [facility] calls back, please transfer to RN [phone]. Thanks.

## 2019-04-13 ENCOUNTER — HOSPITAL ENCOUNTER (OUTPATIENT)
Age: 79
Discharge: HOME | End: 2019-04-13
Attending: FAMILY MEDICINE
Payer: MEDICARE

## 2019-04-13 VITALS
RESPIRATION RATE: 16 BRPM | HEART RATE: 89 BPM | TEMPERATURE: 98 F | WEIGHT: 169 LBS | OXYGEN SATURATION: 98 % | DIASTOLIC BLOOD PRESSURE: 71 MMHG | BODY MASS INDEX: 26 KG/M2 | SYSTOLIC BLOOD PRESSURE: 145 MMHG

## 2019-04-13 DIAGNOSIS — N30.00 ACUTE CYSTITIS WITHOUT HEMATURIA: Primary | ICD-10-CM

## 2019-04-13 DIAGNOSIS — R33.9 URINARY RETENTION: ICD-10-CM

## 2019-04-13 DIAGNOSIS — R39.14 BENIGN PROSTATIC HYPERPLASIA WITH INCOMPLETE BLADDER EMPTYING: ICD-10-CM

## 2019-04-13 DIAGNOSIS — N40.1 BENIGN PROSTATIC HYPERPLASIA WITH INCOMPLETE BLADDER EMPTYING: ICD-10-CM

## 2019-04-13 PROCEDURE — 99214 OFFICE O/P EST MOD 30 MIN: CPT

## 2019-04-13 PROCEDURE — 99213 OFFICE O/P EST LOW 20 MIN: CPT

## 2019-04-13 RX ORDER — TAMSULOSIN HYDROCHLORIDE 0.4 MG/1
0.4 CAPSULE ORAL DAILY
Qty: 7 CAPSULE | Refills: 0 | Status: SHIPPED | OUTPATIENT
Start: 2019-04-13 | End: 2019-04-20

## 2019-04-13 RX ORDER — CIPROFLOXACIN 500 MG/1
500 TABLET, FILM COATED ORAL 2 TIMES DAILY
Qty: 14 TABLET | Refills: 0 | Status: SHIPPED | OUTPATIENT
Start: 2019-04-13 | End: 2019-04-20

## 2019-04-13 NOTE — ED PROVIDER NOTES
Patient Seen in: [address] [facility]    History   Patient presents with:  Urinary Symptoms (urologic)    Stated Complaint: unable to urinate    HPI    *79-year-old male with a history of hypertension, BPH status post TURP presents to the imme Pulse 89   Resp 16   Temp 97.8 °F (36.6 °C)   Temp src Temporal   SpO2 98 %   O2 Device None (Room air)       Current:/71   Pulse 89   Temp 97.8 °F (36.6 °C) (Temporal)   Resp 16   Wt 76.7 kg   SpO2 98%   BMI 26.47 kg/m²         Physical Exam    Ge Urine None Seen None Seen    WBC Clump None Seen PresentAbnormal           MDM       Urine culture has been ordered. Cipro BID x 7 days  Flomax for the next few days. Hold finasteride.   Follow-up with urology as directed          Disposition and Plan

## 2019-04-13 NOTE — ED INITIAL ASSESSMENT (HPI)
For 5 days having urgency, freq, and pressure in his lower abd, no fevers, he self craterization 2 times a day since January 2019.

## 2019-04-15 ENCOUNTER — TELEPHONE (OUTPATIENT)
Dept: SURGERY | Facility: CLINIC | Age: 79
End: 2019-04-15

## 2019-04-15 NOTE — TELEPHONE ENCOUNTER
Dr. Candance Rocher, patient sent a [employer] message this morning stating he went to [facility] urgent care 2 days ago and was given Cipro 500 mg PO BID for 7 days, along with tamsulosin 0.4 mg PO daily for 7 days. Should patient continue the antibiotic Cipro?  Or

## 2019-04-15 NOTE — TELEPHONE ENCOUNTER
----- Message from Larisa Iniguez MD sent at 4/15/2019  8:23 AM CDT -----  Staff please call this patient. Let him know his urine culture from last week is growing bacteria consistent with urinary tract infection.   I have sent a 5-day prescription of Bact

## 2019-04-16 ENCOUNTER — PATIENT MESSAGE (OUTPATIENT)
Dept: INTERNAL MEDICINE CLINIC | Facility: CLINIC | Age: 79
End: 2019-04-16

## 2019-04-16 RX ORDER — AMLODIPINE BESYLATE 10 MG/1
TABLET ORAL
Qty: 90 TABLET | Refills: 0 | Status: SHIPPED | OUTPATIENT
Start: 2019-04-16 | End: 2019-07-01

## 2019-04-16 NOTE — TELEPHONE ENCOUNTER
From: Lety Barry  To: Amador Spencer MD  Sent: 4/16/2019 8:55 AM CDT  Subject: Prescription Question    Pasha has only one more amlodipine besylate 10 mg left. It says no refills dr. Donny Marino. Required. He just saw doctor not that long ago.  I believe

## 2019-04-17 RX ORDER — OMEPRAZOLE 40 MG/1
40 CAPSULE, DELAYED RELEASE ORAL 2 TIMES DAILY
Qty: 180 CAPSULE | Refills: 0 | Status: SHIPPED | OUTPATIENT
Start: 2019-04-17 | End: 2019-07-01

## 2019-04-17 NOTE — TELEPHONE ENCOUNTER
Last Office Visit: 3-1-19 with TB for cpe  Last Rx Filled: 3-14-19 60 caps with no refills  Last Labs: 2-7-19 hga1c/cbc/cmp/lipid  Future Appointment: 9-17-19    Per protocol to provider

## 2019-05-28 NOTE — TELEPHONE ENCOUNTER
Requesting metformin  LOV: 2/28/19  RTC: 4 weeks  Filled: 2/28/19 #180 with 0 refills    Future Appointments   Date Time Provider Sadia Webb   5/30/2019  9:20 AM Marianne Bey MD [facility]   6/17/2019  7:00 AM Jacey Ospina MD [facility]

## 2019-05-30 ENCOUNTER — OFFICE VISIT (OUTPATIENT)
Dept: INTERNAL MEDICINE CLINIC | Facility: CLINIC | Age: 79
End: 2019-05-30
Payer: MEDICARE

## 2019-05-30 VITALS
DIASTOLIC BLOOD PRESSURE: 64 MMHG | SYSTOLIC BLOOD PRESSURE: 132 MMHG | BODY MASS INDEX: 26.62 KG/M2 | HEART RATE: 72 BPM | WEIGHT: 169.63 LBS | RESPIRATION RATE: 12 BRPM | HEIGHT: 67 IN

## 2019-05-30 DIAGNOSIS — E66.3 OVERWEIGHT WITH BODY MASS INDEX (BMI) 25.0-29.9: ICD-10-CM

## 2019-05-30 DIAGNOSIS — I10 ESSENTIAL HYPERTENSION: ICD-10-CM

## 2019-05-30 DIAGNOSIS — Z51.81 THERAPEUTIC DRUG MONITORING: Primary | ICD-10-CM

## 2019-05-30 DIAGNOSIS — R73.01 IFG (IMPAIRED FASTING GLUCOSE): ICD-10-CM

## 2019-05-30 PROCEDURE — 99214 OFFICE O/P EST MOD 30 MIN: CPT | Performed by: INTERNAL MEDICINE

## 2019-05-30 RX ORDER — METFORMIN HYDROCHLORIDE 750 MG/1
TABLET, EXTENDED RELEASE ORAL
Qty: 90 TABLET | Refills: 1 | Status: SHIPPED | OUTPATIENT
Start: 2019-05-30 | End: 2019-08-27

## 2019-05-30 RX ORDER — METFORMIN HYDROCHLORIDE 750 MG/1
TABLET, EXTENDED RELEASE ORAL
Qty: 180 TABLET | Refills: 0 | OUTPATIENT
Start: 2019-05-30

## 2019-05-30 NOTE — PROGRESS NOTES
HISTORY OF PRESENT ILLNESS  Patient presents with:  Weight Check: down 3#      Lety Barry is a 70year old male here for follow up in medical weight loss program.     Denies chest pain, shortness of breath, dizziness, blurred vision, headache, p 02/07/2019    BILT 1.0 02/07/2019    TP 7.4 02/07/2019    ALB 3.7 02/07/2019    GLOBULIN 3.7 02/07/2019     02/07/2019    K 3.7 02/07/2019     02/07/2019    CO2 27.0 02/07/2019     Lab Results   Component Value Date     02/07/2019    A1C mouth daily. Disp:  Rfl:    ASPIRIN 81 OR Take 1 tablet by mouth daily. Disp:  Rfl:      No current facility-administered medications on file prior to visit.      ASSESSMENT  Analyzed weight data:       Diagnoses and all orders for this visit:    Mary Ramírez

## 2019-06-27 ENCOUNTER — OFFICE VISIT (OUTPATIENT)
Dept: SURGERY | Facility: CLINIC | Age: 79
End: 2019-06-27
Payer: MEDICARE

## 2019-06-27 VITALS
WEIGHT: 170 LBS | HEART RATE: 78 BPM | DIASTOLIC BLOOD PRESSURE: 57 MMHG | SYSTOLIC BLOOD PRESSURE: 157 MMHG | HEIGHT: 67 IN | BODY MASS INDEX: 26.68 KG/M2 | TEMPERATURE: 98 F

## 2019-06-27 DIAGNOSIS — N40.1 BENIGN PROSTATIC HYPERPLASIA WITH LOWER URINARY TRACT SYMPTOMS, SYMPTOM DETAILS UNSPECIFIED: Primary | ICD-10-CM

## 2019-06-27 PROCEDURE — 99213 OFFICE O/P EST LOW 20 MIN: CPT | Performed by: UROLOGY

## 2019-06-27 PROCEDURE — G0463 HOSPITAL OUTPT CLINIC VISIT: HCPCS | Performed by: UROLOGY

## 2019-06-27 NOTE — PROGRESS NOTES
Comfort Lagunas is a 70year old male. HPI:   Patient presents with:   Follow - Up: Patient presents today for 4 month follow up.       70-year-old male with chronic BPH previously seen by outside urologist and undergone cystoscopy greenlight laser Besylate 10 MG Oral Tab TK 1 T PO QD Disp: 90 tablet Rfl: 0   HYDROCHLOROTHIAZIDE 25 MG Oral Tab TAKE 1 TABLET BY MOUTH EVERY DAY Disp: 90 tablet Rfl: 3   gabapentin 300 MG Oral Cap TAKE 1 CAPSULE BY MOUTH THREE TIMES DAILY Disp: 270 capsule Rfl: 1   SIMVA

## 2019-07-01 RX ORDER — OMEPRAZOLE 40 MG/1
CAPSULE, DELAYED RELEASE ORAL
Qty: 180 CAPSULE | Refills: 3 | Status: SHIPPED | OUTPATIENT
Start: 2019-07-01 | End: 2020-06-29

## 2019-07-01 RX ORDER — AMLODIPINE BESYLATE 10 MG/1
TABLET ORAL
Qty: 90 TABLET | Refills: 0 | Status: SHIPPED | OUTPATIENT
Start: 2019-07-01 | End: 2019-09-29

## 2019-07-01 NOTE — TELEPHONE ENCOUNTER
LOV:3/1/19 TB  FOV:9/17/19   LAST RX:4/17/19 40 mg take 1 cap twice a day 180 caps 0 refills   LAST LABS:4/12/19 urinalysis,urine culture   PER PROTOCOL: to provider

## 2019-07-02 ENCOUNTER — TELEPHONE (OUTPATIENT)
Dept: INTERNAL MEDICINE CLINIC | Facility: CLINIC | Age: 79
End: 2019-07-02

## 2019-07-02 RX ORDER — HYDROCHLOROTHIAZIDE 25 MG/1
25 TABLET ORAL
Qty: 90 TABLET | Refills: 3 | Status: SHIPPED | OUTPATIENT
Start: 2019-07-02 | End: 2020-06-25

## 2019-07-15 ENCOUNTER — HOSPITAL ENCOUNTER (OUTPATIENT)
Age: 79
Discharge: HOME | End: 2019-07-15
Payer: MEDICARE

## 2019-07-15 VITALS
OXYGEN SATURATION: 96 % | HEIGHT: 67.5 IN | WEIGHT: 168 LBS | HEART RATE: 79 BPM | DIASTOLIC BLOOD PRESSURE: 67 MMHG | TEMPERATURE: 98 F | RESPIRATION RATE: 20 BRPM | BODY MASS INDEX: 26.06 KG/M2 | SYSTOLIC BLOOD PRESSURE: 166 MMHG

## 2019-07-15 DIAGNOSIS — G62.9 NEUROPATHY: Primary | ICD-10-CM

## 2019-07-15 PROCEDURE — 99214 OFFICE O/P EST MOD 30 MIN: CPT

## 2019-07-15 PROCEDURE — 99213 OFFICE O/P EST LOW 20 MIN: CPT

## 2019-07-15 RX ORDER — METHYLPREDNISOLONE 4 MG/1
TABLET ORAL
Qty: 1 PACKAGE | Refills: 0 | Status: SHIPPED | OUTPATIENT
Start: 2019-07-15 | End: 2019-09-17

## 2019-07-15 RX ORDER — TRAMADOL HYDROCHLORIDE 50 MG/1
50 TABLET ORAL EVERY 6 HOURS PRN
Qty: 12 TABLET | Refills: 0 | Status: SHIPPED | OUTPATIENT
Start: 2019-07-15 | End: 2019-07-22

## 2019-07-15 NOTE — ED INITIAL ASSESSMENT (HPI)
Pt sts burning to right shin with sharp shooting pain from buttock down leg since July 4th. Pain occurs mainly when laying flat or when walking. No known injury but did a lot of walking and lifting on the 4th.

## 2019-07-15 NOTE — ED PROVIDER NOTES
Patient Seen in: [address] [facility] [place] [place]    History   Patient presents with:  Lower Extremity Injury (musculoskeletal)    Stated Complaint: RIGHT LEG BURNING DIFFICULT TO WALK    81-year-old male presents today with complaints of burning pain to TO WALK  Other systems are as noted in HPI. Constitutional and vital signs reviewed. All other systems reviewed and negative except as noted above. Physical Exam     ED Triage Vitals [07/15/19 1258]   BP (!) 166/67   Pulse 79   Resp 20   Temp 98. 1 gabapentin for neuropathy. Do believe this is exacerbation of his neuropathy. Will give prescription for Medrol Dosepak to help with inflammation as well as Ultram for acute pain.   Suggested taking ibuprofen first and if still having pain may take Norco.

## 2019-07-19 ENCOUNTER — OFFICE VISIT (OUTPATIENT)
Dept: INTERNAL MEDICINE CLINIC | Facility: CLINIC | Age: 79
End: 2019-07-19
Payer: MEDICARE

## 2019-07-19 VITALS
HEART RATE: 72 BPM | DIASTOLIC BLOOD PRESSURE: 60 MMHG | RESPIRATION RATE: 14 BRPM | BODY MASS INDEX: 26.87 KG/M2 | WEIGHT: 171.19 LBS | SYSTOLIC BLOOD PRESSURE: 138 MMHG | HEIGHT: 67 IN

## 2019-07-19 DIAGNOSIS — M79.604 RIGHT LEG PAIN: Primary | ICD-10-CM

## 2019-07-19 DIAGNOSIS — E78.00 PURE HYPERCHOLESTEROLEMIA: ICD-10-CM

## 2019-07-19 DIAGNOSIS — R73.09 ELEVATED GLUCOSE LEVEL: ICD-10-CM

## 2019-07-19 DIAGNOSIS — M54.16 LUMBAR RADICULOPATHY: ICD-10-CM

## 2019-07-19 PROCEDURE — 99214 OFFICE O/P EST MOD 30 MIN: CPT | Performed by: INTERNAL MEDICINE

## 2019-07-19 RX ORDER — SIMVASTATIN 20 MG
20 TABLET ORAL NIGHTLY
Qty: 90 TABLET | Refills: 1 | Status: SHIPPED | OUTPATIENT
Start: 2019-07-19 | End: 2019-10-07

## 2019-07-19 RX ORDER — METHYLPREDNISOLONE 4 MG/1
TABLET ORAL
Qty: 1 KIT | Refills: 0 | Status: SHIPPED | OUTPATIENT
Start: 2019-07-19 | End: 2019-09-17

## 2019-07-19 NOTE — PROGRESS NOTES
Marilyn Godoy is a 70year old male. Patient presents with:  Leg Pain: cn room 3: R leg pain since urgent 7/4/19 patient went to urgent care , has pre dm, HL      HPI:     Patient here for right leg pain since July 4th.  He lifted a heavy bag of po 750 MG Oral Tablet 24 Hr TAKE 1 TABLET(750 MG) BY MOUTH TWICE PER DAY (Patient taking differently: TAKE 1 TABLET(750 MG) BY MOUTH ONCE PER DAY ) Disp: 90 tablet Rfl: 1   gabapentin 300 MG Oral Cap TAKE 1 CAPSULE BY MOUTH THREE TIMES DAILY Disp: 270 capsule No adenopathy      EXAM:   /60 (BP Location: Right arm, Patient Position: Sitting, Cuff Size: adult)   Pulse 72   Resp 14   Ht 67\"   Wt 171 lb 3.2 oz   BMI 26.81 kg/m²   GENERAL: well developed, NAD  LUNGS: normal rate without respiratory distress,

## 2019-07-22 ENCOUNTER — PATIENT MESSAGE (OUTPATIENT)
Dept: INTERNAL MEDICINE CLINIC | Facility: CLINIC | Age: 79
End: 2019-07-22

## 2019-07-22 DIAGNOSIS — M54.16 LUMBAR RADICULOPATHY: Primary | ICD-10-CM

## 2019-07-22 NOTE — TELEPHONE ENCOUNTER
From: Lola Berry  To: Brian Cee MD  Sent: 7/22/2019 5:57 AM CDT  Subject: Visit Follow-up Question    Pasha has been raking 2 Advil pm to sleep pretty much nightly since the problem on 7-4 started.  He just realized last night he should not h

## 2019-07-22 NOTE — TELEPHONE ENCOUNTER
LOV: 7/19/19  ASSESSMENT AND PLAN:      Right leg pain related to Lumbar radiculopathy - medrol dose diana as directed. Patient to call me if symptoms persist. I will order lumbar xray and PT if needed.  Advised to not lift anything over 20 pounds  Pure hyper

## 2019-07-22 NOTE — TELEPHONE ENCOUNTER
advil pm does not affect efficacy of steroids.   If he is still in pain, advise to do xray and then PT (I will order both)

## 2019-07-24 ENCOUNTER — HOSPITAL ENCOUNTER (OUTPATIENT)
Dept: GENERAL RADIOLOGY | Age: 79
Discharge: HOME | End: 2019-07-24
Attending: INTERNAL MEDICINE
Payer: MEDICARE

## 2019-07-24 DIAGNOSIS — M54.16 LUMBAR RADICULOPATHY: ICD-10-CM

## 2019-07-24 PROCEDURE — 72114 X-RAY EXAM L-S SPINE BENDING: CPT | Performed by: INTERNAL MEDICINE

## 2019-07-24 NOTE — TELEPHONE ENCOUNTER
Dara Leo   Note   [facility] in [facility] [phone].  Calling to see if pt truly needs both XR LUMBAR SPINE COMPLETE W/FLEX + EXT   Please advise pt is in the exam room waiting       Soke with Sathya Rosa @radiology needing to confirm

## 2019-07-24 NOTE — PROGRESS NOTES
Arpita Ponce [facility] in [facility] [phone].  Calling to see if pt truly needs both XR LUMBAR SPINE COMPLETE W/FLEX + EXT   Please advise pt is in the exam room waiting

## 2019-07-25 ENCOUNTER — TELEPHONE (OUTPATIENT)
Dept: INTERNAL MEDICINE CLINIC | Facility: CLINIC | Age: 79
End: 2019-07-25

## 2019-07-25 DIAGNOSIS — I71.4 ABDOMINAL AORTIC ANEURYSM (AAA) WITHOUT RUPTURE (HCC): Primary | ICD-10-CM

## 2019-08-01 ENCOUNTER — PATIENT MESSAGE (OUTPATIENT)
Dept: INTERNAL MEDICINE CLINIC | Facility: CLINIC | Age: 79
End: 2019-08-01

## 2019-08-01 RX ORDER — CYCLOBENZAPRINE HCL 5 MG
5 TABLET ORAL NIGHTLY PRN
Qty: 30 TABLET | Refills: 0 | Status: SHIPPED | OUTPATIENT
Start: 2019-08-01 | End: 2021-05-13 | Stop reason: ALTCHOICE

## 2019-08-02 ENCOUNTER — HOSPITAL ENCOUNTER (OUTPATIENT)
Dept: ULTRASOUND IMAGING | Age: 79
Discharge: HOME | End: 2019-08-02
Attending: INTERNAL MEDICINE
Payer: MEDICARE

## 2019-08-02 ENCOUNTER — APPOINTMENT (OUTPATIENT)
Dept: LAB | Age: 79
End: 2019-08-02
Attending: INTERNAL MEDICINE
Payer: MEDICARE

## 2019-08-02 DIAGNOSIS — R73.09 ELEVATED GLUCOSE LEVEL: ICD-10-CM

## 2019-08-02 DIAGNOSIS — E78.00 PURE HYPERCHOLESTEROLEMIA: ICD-10-CM

## 2019-08-02 DIAGNOSIS — I71.4 ABDOMINAL AORTIC ANEURYSM (AAA) WITHOUT RUPTURE (HCC): ICD-10-CM

## 2019-08-02 LAB
ALBUMIN SERPL-MCNC: 3.8 G/DL (ref 3.4–5)
ALBUMIN/GLOB SERPL: 1.1 {RATIO} (ref 1–2)
ALP LIVER SERPL-CCNC: 52 U/L (ref 45–117)
ALT SERPL-CCNC: 32 U/L (ref 16–61)
ANION GAP SERPL CALC-SCNC: 3 MMOL/L (ref 0–18)
AST SERPL-CCNC: 15 U/L (ref 15–37)
BILIRUB SERPL-MCNC: 0.7 MG/DL (ref 0.1–2)
BUN BLD-MCNC: 33 MG/DL (ref 7–18)
BUN/CREAT SERPL: 25.4 (ref 10–20)
CALCIUM BLD-MCNC: 9.5 MG/DL (ref 8.5–10.1)
CHLORIDE SERPL-SCNC: 109 MMOL/L (ref 98–112)
CHOLEST SMN-MCNC: 153 MG/DL (ref ?–200)
CO2 SERPL-SCNC: 29 MMOL/L (ref 21–32)
CREAT BLD-MCNC: 1.3 MG/DL (ref 0.7–1.3)
EST. AVERAGE GLUCOSE BLD GHB EST-MCNC: 120 MG/DL (ref 68–126)
GLOBULIN PLAS-MCNC: 3.4 G/DL (ref 2.8–4.4)
GLUCOSE BLD-MCNC: 94 MG/DL (ref 70–99)
HBA1C MFR BLD HPLC: 5.8 % (ref ?–5.7)
HDLC SERPL-MCNC: 31 MG/DL (ref 40–59)
LDLC SERPL CALC-MCNC: 86 MG/DL (ref ?–100)
M PROTEIN MFR SERPL ELPH: 7.2 G/DL (ref 6.4–8.2)
NONHDLC SERPL-MCNC: 122 MG/DL (ref ?–130)
OSMOLALITY SERPL CALC.SUM OF ELEC: 299 MOSM/KG (ref 275–295)
POTASSIUM SERPL-SCNC: 4 MMOL/L (ref 3.5–5.1)
SODIUM SERPL-SCNC: 141 MMOL/L (ref 136–145)
TRIGL SERPL-MCNC: 182 MG/DL (ref 30–149)
VLDLC SERPL CALC-MCNC: 36 MG/DL (ref 0–30)

## 2019-08-02 PROCEDURE — 83036 HEMOGLOBIN GLYCOSYLATED A1C: CPT

## 2019-08-02 PROCEDURE — 80061 LIPID PANEL: CPT

## 2019-08-02 PROCEDURE — 80053 COMPREHEN METABOLIC PANEL: CPT

## 2019-08-02 PROCEDURE — 76770 US EXAM ABDO BACK WALL COMP: CPT | Performed by: INTERNAL MEDICINE

## 2019-08-02 NOTE — TELEPHONE ENCOUNTER
[phone]  Lm for pt (Ivania Maki per HIPAA) to inform, per TB, sent low dose muscle relaxer- Cyclobenzaprine HCl 5MG to take at night to help with back pain and sleep. Sent to [facility] listed. To call back at the office if any further questions.

## 2019-08-12 ENCOUNTER — OFFICE VISIT (OUTPATIENT)
Dept: PHYSICAL THERAPY | Age: 79
End: 2019-08-12
Attending: INTERNAL MEDICINE
Payer: MEDICARE

## 2019-08-12 DIAGNOSIS — M54.16 LUMBAR RADICULOPATHY: ICD-10-CM

## 2019-08-12 PROCEDURE — 97140 MANUAL THERAPY 1/> REGIONS: CPT

## 2019-08-12 PROCEDURE — 97162 PT EVAL MOD COMPLEX 30 MIN: CPT

## 2019-08-12 NOTE — PROGRESS NOTES
INITIAL EVALUATION:   Referring Physician: Dr. Jerri Fischer  Diagnosis:  Lumbar radiculopathy   Date of Service: 8/12/2019     PATIENT SUMMARY    Yolanda Nova is a 70year old male; active; travel; working; owner of packaging business; he is referr complaint  Passive/segmental/accessory movement testing:    -There is easing of complaint with gapping procedures of the left lumbar region in right S/L  Neural dynamics:   -Crossed SLR (-)    Today’s Treatment and Response:  -Patient was seen for manual t Potential:good    Charges:   PT Eval Moderate Complexity  Manual Therapy x 1    Total Timed Treatment: 15 min       Total Treatment Time: 45 min     FOTO: To be provided; not available @ this time/100      Patient/Family/Caregiver was advised of these find

## 2019-08-16 ENCOUNTER — OFFICE VISIT (OUTPATIENT)
Dept: PHYSICAL THERAPY | Age: 79
End: 2019-08-16
Attending: INTERNAL MEDICINE
Payer: MEDICARE

## 2019-08-16 DIAGNOSIS — M54.16 LUMBAR RADICULOPATHY: ICD-10-CM

## 2019-08-16 PROCEDURE — 97140 MANUAL THERAPY 1/> REGIONS: CPT

## 2019-08-16 PROCEDURE — 97110 THERAPEUTIC EXERCISES: CPT

## 2019-08-16 NOTE — PROGRESS NOTES
Dx:    Lumbar radiculopathy         Insurance (Authorized # of Visits):   No prior authorization is required per appointment notes           Authorizing Physician: Dr. 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 Carolyne Johnson following office visit; chart r easing of complaint with gapping procedures of the left lumbar region in right S/L  Neural dynamics:   -Crossed SLR (-)    Today’s Treatment and Response:  -Patient was seen for manual therapy procedures consisting of left sided gapping and manual traction Timed Treatment: 15 min       Total Treatment Time: 45 min     FOTO: To be provided; not available @ this time/100      Patient/Family/Caregiver was advised of these findings, precautions, and treatment options and has agreed to actively participate in diomedes

## 2019-08-19 ENCOUNTER — OFFICE VISIT (OUTPATIENT)
Dept: PHYSICAL THERAPY | Age: 79
End: 2019-08-19
Attending: INTERNAL MEDICINE
Payer: MEDICARE

## 2019-08-19 DIAGNOSIS — M54.16 LUMBAR RADICULOPATHY: ICD-10-CM

## 2019-08-19 PROCEDURE — 97110 THERAPEUTIC EXERCISES: CPT

## 2019-08-19 PROCEDURE — 97140 MANUAL THERAPY 1/> REGIONS: CPT

## 2019-08-19 NOTE — PROGRESS NOTES
Dx:    Lumbar radiculopathy         Insurance (Authorized # of Visits):   No prior authorization is required per appointment notes           Authorizing Physician: Dr. Mejia Valentin    Next MD visit: none scheduled  Fall Risk: standard           Precautions: n/a rotation mobilization in left side lying L2-L5 grade III   -Instruction in supine single knee to chest:  5 - 10 second hold x 15 reps x 2 sets 3 x/day (HEP)  -Reviewed sitting flexion for symptom modulation as discussed/instructed last visit (HEP)  -Ration lying down    Pt describes pain level:  -10/10 at worst  Current functional limitations include:  -Walking around home, community, shop @ work  [address] describes prior level of function:  -Regular exercise 2-3 x per week with ; walking on for symptom modulation; initial flexion biased approach with progression to extension restoration   -Anticipate favorable progression with goal achievement in 10 visits with close monitoring.     Precautions:  None      PLAN OF CARE:    Goals:    10 visits

## 2019-08-23 ENCOUNTER — OFFICE VISIT (OUTPATIENT)
Dept: PHYSICAL THERAPY | Age: 79
End: 2019-08-23
Attending: INTERNAL MEDICINE
Payer: MEDICARE

## 2019-08-23 DIAGNOSIS — M54.16 LUMBAR RADICULOPATHY: ICD-10-CM

## 2019-08-23 PROCEDURE — 97140 MANUAL THERAPY 1/> REGIONS: CPT

## 2019-08-23 PROCEDURE — 97110 THERAPEUTIC EXERCISES: CPT

## 2019-08-23 NOTE — PROGRESS NOTES
Dx:    Lumbar radiculopathy         Insurance (Authorized # of Visits):   No prior authorization is required per appointment notes           Authorizing Physician: Dr. Kayce Shannon    Next MD visit: none scheduled  Fall Risk: standard           Precautions: n/a use home program for symptom modulation -Reviewed supine single knee to chest stretch with additional instruction provided based on patient performance.  -Instruction in supine LTR with return demonstration by patient - progressed to home program  -Reviewe level:  -10/10 at worst  Current functional limitations include:  -Walking around home, community, shop @ work  [address] describes prior level of function:  -Regular exercise 2-3 x per week with ; walking on treadmill; upper body exercise flexion biased approach with progression to extension restoration   -Anticipate favorable progression with goal achievement in 10 visits with close monitoring.     Precautions:  None      PLAN OF CARE:    Goals:    10 visits  -Patient will demonstrate activ

## 2019-08-26 ENCOUNTER — OFFICE VISIT (OUTPATIENT)
Dept: PHYSICAL THERAPY | Age: 79
End: 2019-08-26
Attending: INTERNAL MEDICINE
Payer: MEDICARE

## 2019-08-26 DIAGNOSIS — M54.16 LUMBAR RADICULOPATHY: ICD-10-CM

## 2019-08-26 PROCEDURE — 97140 MANUAL THERAPY 1/> REGIONS: CPT

## 2019-08-26 PROCEDURE — 97110 THERAPEUTIC EXERCISES: CPT

## 2019-08-26 RX ORDER — METFORMIN HYDROCHLORIDE 750 MG/1
TABLET, EXTENDED RELEASE ORAL
Qty: 90 TABLET | Refills: 0 | OUTPATIENT
Start: 2019-08-26

## 2019-08-26 NOTE — TELEPHONE ENCOUNTER
Requesting Metformin ER  LOV: 5/30/19  RTC: not noted  Last Relevant Labs: 8/2/19  Filled: 5/30/19 #90 with 1 refills    Future Appointments   Date Time Provider Sadia Webb   9/13/2019  7:00 AM [facility]   9/17/2019  9:

## 2019-08-26 NOTE — PROGRESS NOTES
Dx:    Lumbar radiculopathy         Insurance (Authorized # of Visits):   No prior authorization is required per appointment notes           Authorizing Physician: Dr. Leonardo Hinds    Next MD visit: none scheduled  Fall Risk: standard           Precautions: n/a sidelying - grade III  -Right gapping procedures and rotation mobilization in left side lying L2-L5 grade III -R unilateral PA L2-L5 with left sidelying - grade III  -Right gapping procedures and rotation mobilization in left side lying L2-L5 grade III -R EVALUATION:   Referring Physician: Dr. 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 Lise Abdi complaint  Passive/segmental/accessory movement testing:    -There is easing of complaint with gapping procedures of the left lumbar region in right S/L  Neural dynamics:   -Crossed SLR (-)    Today’s Treatment and Response:  -Patient was seen for manual t Potential:good    Charges:   PT Eval Moderate Complexity  Manual Therapy x 1    Total Timed Treatment: 15 min       Total Treatment Time: 45 min     FOTO: To be provided; not available @ this time/100      Patient/Family/Caregiver was advised of these find

## 2019-08-27 NOTE — TELEPHONE ENCOUNTER
Requesting Metformin  LOV: 5/30/19  RTC: not noted  Last Relevant Labs: 8/2/19  Filled: 5/30/19 #90 with 1 refills    Future Appointments   Date Time Provider Sadia Webb   9/13/2019  7:00 AM Rose Mary [facility] [place]   9/17/2019  9:00

## 2019-08-28 ENCOUNTER — APPOINTMENT (OUTPATIENT)
Dept: PHYSICAL THERAPY | Age: 79
End: 2019-08-28
Attending: INTERNAL MEDICINE
Payer: MEDICARE

## 2019-08-28 ENCOUNTER — PATIENT MESSAGE (OUTPATIENT)
Dept: INTERNAL MEDICINE CLINIC | Facility: CLINIC | Age: 79
End: 2019-08-28

## 2019-08-28 ENCOUNTER — TELEPHONE (OUTPATIENT)
Dept: INTERNAL MEDICINE CLINIC | Facility: CLINIC | Age: 79
End: 2019-08-28

## 2019-08-28 RX ORDER — METFORMIN HYDROCHLORIDE 750 MG/1
TABLET, EXTENDED RELEASE ORAL
Qty: 180 TABLET | Refills: 0 | Status: SHIPPED | OUTPATIENT
Start: 2019-08-28 | End: 2019-11-22

## 2019-08-28 NOTE — TELEPHONE ENCOUNTER
From: Alberto Matthews  To: Clary Alcazar MD  Sent: 8/28/2019 5:14 PM CDT  Subject: Other    This message is for [place]. She was going to fax something for me regarding my leg but we didn’t receive. The fax is [phone]. Thank you.

## 2019-08-28 NOTE — TELEPHONE ENCOUNTER
To: [facility] CLINICAL STAFF      From: Swapna Vieira      Created: 8/27/2019 8:19 AM        *-*-*This message has not been handled. *-*-*    Thanks Milena.  You can fax to [phone]  ----- Message -----  From: Sae Celis  Sent: 8/27/2019

## 2019-09-03 RX ORDER — LISINOPRIL 40 MG/1
TABLET ORAL
Qty: 90 TABLET | Refills: 0 | Status: SHIPPED | OUTPATIENT
Start: 2019-09-03 | End: 2020-07-01

## 2019-09-03 NOTE — TELEPHONE ENCOUNTER
Last Ov: 7/19/19, TB, acute  Upcoming appt: 9/17/19  Last labs:CMP, Lipid, A1c 8/2/19  Last Rx: Lisinopril 40mg, #90, 1R 3/7/19    Per Protocol, passed.  Refill sent

## 2019-09-04 ENCOUNTER — APPOINTMENT (OUTPATIENT)
Dept: PHYSICAL THERAPY | Age: 79
End: 2019-09-04
Attending: INTERNAL MEDICINE
Payer: MEDICARE

## 2019-09-06 ENCOUNTER — APPOINTMENT (OUTPATIENT)
Dept: PHYSICAL THERAPY | Age: 79
End: 2019-09-06
Attending: INTERNAL MEDICINE
Payer: MEDICARE

## 2019-09-09 ENCOUNTER — APPOINTMENT (OUTPATIENT)
Dept: PHYSICAL THERAPY | Age: 79
End: 2019-09-09
Attending: INTERNAL MEDICINE
Payer: MEDICARE

## 2019-09-10 RX ORDER — GABAPENTIN 300 MG/1
CAPSULE ORAL
Qty: 270 CAPSULE | Refills: 0 | Status: SHIPPED | OUTPATIENT
Start: 2019-09-10 | End: 2019-12-05

## 2019-09-10 RX ORDER — SIMVASTATIN 40 MG
TABLET ORAL
Qty: 90 TABLET | Refills: 0 | Status: SHIPPED | OUTPATIENT
Start: 2019-09-10 | End: 2019-10-07 | Stop reason: DRUGHIGH

## 2019-09-10 NOTE — TELEPHONE ENCOUNTER
Last Office Visit: 7-19-19 with TB for leg pain   Last Rx Filled: 3-16-19 270 caps with 1 refill  Last Labs: 8-2-19 hga1c/lipid/cmp  Future Appointment: 9-17-19    Per protocol to provider

## 2019-09-13 ENCOUNTER — OFFICE VISIT (OUTPATIENT)
Dept: PHYSICAL THERAPY | Age: 79
End: 2019-09-13
Attending: INTERNAL MEDICINE
Payer: MEDICARE

## 2019-09-13 DIAGNOSIS — M54.16 LUMBAR RADICULOPATHY: ICD-10-CM

## 2019-09-13 PROCEDURE — 97164 PT RE-EVAL EST PLAN CARE: CPT

## 2019-09-13 NOTE — PROGRESS NOTES
Discharge Summary    Pt has attended 6 visits in Physical Therapy. Dx:    Lumbar radiculopathy         Insurance (Authorized # of Visits):   No prior authorization is required per appointment notes           Authorizing Physician: Dr. Baljit Smith MD gapping procedures and rotation mobilization in left side lying L2-L5 grade III -R unilateral PA L3-L5 with left sidelying - grade III  -Right gapping procedures and rotation mobilization in left side lying L3-L5 grade III -Patient was seen for re-evaluati

## 2019-09-17 ENCOUNTER — OFFICE VISIT (OUTPATIENT)
Dept: INTERNAL MEDICINE CLINIC | Facility: CLINIC | Age: 79
End: 2019-09-17
Payer: MEDICARE

## 2019-09-17 VITALS
RESPIRATION RATE: 14 BRPM | HEART RATE: 72 BPM | WEIGHT: 168.38 LBS | SYSTOLIC BLOOD PRESSURE: 122 MMHG | BODY MASS INDEX: 26.43 KG/M2 | HEIGHT: 67 IN | TEMPERATURE: 98 F | DIASTOLIC BLOOD PRESSURE: 60 MMHG

## 2019-09-17 DIAGNOSIS — K21.9 GASTROESOPHAGEAL REFLUX DISEASE WITHOUT ESOPHAGITIS: ICD-10-CM

## 2019-09-17 DIAGNOSIS — E78.2 MIXED HYPERLIPIDEMIA: ICD-10-CM

## 2019-09-17 DIAGNOSIS — R73.9 BLOOD GLUCOSE ELEVATED: ICD-10-CM

## 2019-09-17 DIAGNOSIS — M54.17 LUMBOSACRAL RADICULOPATHY: ICD-10-CM

## 2019-09-17 DIAGNOSIS — I10 ESSENTIAL HYPERTENSION: Primary | ICD-10-CM

## 2019-09-17 PROCEDURE — 99214 OFFICE O/P EST MOD 30 MIN: CPT | Performed by: INTERNAL MEDICINE

## 2019-09-17 NOTE — PROGRESS NOTES
Derrick Hurst is a 70year old male. Patient presents with: Follow - Up: cn room 4: follow up on chronic issues       HPI:     Patient here for f/u-  Right leg pain/lumbar radiculopathy is doing much better. PT helped.  He is able to do everything times daily. Disp:  Rfl:    Vitamin D3 2000 units Oral Cap Take 2,000 Units by mouth daily. Disp:  Rfl:    Multiple Vitamins-Minerals (CENTRUM SILVER) Oral Tab Take 1 tablet by mouth daily. Disp:  Rfl:    aspirin 81 MG Oral Chew Tab Chew by mouth daily. cyanosis, clubbing or edema  Spine: no TTP   NEURO: Alert and oriented, no focal deficits    ASSESSMENT AND PLAN:     Essential hypertension - labile at times but overall controlled, CPM  Blood glucose elevated, pre dm- continue metformin and healthy diet

## 2019-09-30 RX ORDER — AMLODIPINE BESYLATE 10 MG/1
TABLET ORAL
Qty: 90 TABLET | Refills: 0 | Status: SHIPPED | OUTPATIENT
Start: 2019-09-30 | End: 2019-12-26

## 2019-10-29 ENCOUNTER — TELEPHONE (OUTPATIENT)
Dept: INTERNAL MEDICINE CLINIC | Facility: CLINIC | Age: 79
End: 2019-10-29

## 2019-10-29 NOTE — TELEPHONE ENCOUNTER
Patient Review of Clinical Information     Problems   This clinical information was not verified.    Medications   This clinical information was not verified, but there are updates pending review:   Reported Medications Start Date Reported By  Comme

## 2019-11-13 ENCOUNTER — TELEPHONE (OUTPATIENT)
Dept: SURGERY | Facility: CLINIC | Age: 79
End: 2019-11-13

## 2019-11-13 NOTE — TELEPHONE ENCOUNTER
Received fax from [address] [place] [address] for detailed order for cath supplies. Order placed on MD's desk.

## 2019-11-14 NOTE — TELEPHONE ENCOUNTER
Order signed by MD. René Xiong to [address] [place] [address], confirmation received. Copy sent to scanning.

## 2019-11-21 ENCOUNTER — E-VISIT (OUTPATIENT)
Dept: FAMILY MEDICINE CLINIC | Facility: CLINIC | Age: 79
End: 2019-11-21

## 2019-11-21 DIAGNOSIS — M54.9 ACUTE BACK PAIN, UNSPECIFIED BACK LOCATION, UNSPECIFIED BACK PAIN LATERALITY: Primary | ICD-10-CM

## 2019-11-21 PROCEDURE — 98969 ONLINE SERVICE BY HC PRO: CPT | Performed by: NURSE PRACTITIONER

## 2019-11-21 RX ORDER — METHYLPREDNISOLONE 4 MG/1
TABLET ORAL
Qty: 1 KIT | Refills: 0 | Status: SHIPPED | OUTPATIENT
Start: 2019-11-21 | End: 2020-07-01

## 2019-11-21 RX ORDER — CYCLOBENZAPRINE HCL 10 MG
10 TABLET ORAL 3 TIMES DAILY
Qty: 30 TABLET | Refills: 1 | Status: SHIPPED | OUTPATIENT
Start: 2019-11-21 | End: 2019-11-27

## 2019-11-25 RX ORDER — METFORMIN HYDROCHLORIDE 750 MG/1
TABLET, EXTENDED RELEASE ORAL
Qty: 180 TABLET | Refills: 0 | Status: SHIPPED | OUTPATIENT
Start: 2019-11-25 | End: 2020-09-30

## 2019-11-25 NOTE — TELEPHONE ENCOUNTER
Requesting Metformin  LOV: 5/30/19  RTC: not noted  Last Relevant Labs: 8/2/19  Filled: 8/28/19 #180 with 0 refills    Future Appointments   Date Time Provider Sadia Webb   11/27/2019 10:40 AM Jesse Fritz MD [facility]   12/16/2019  7:

## 2019-11-27 ENCOUNTER — OFFICE VISIT (OUTPATIENT)
Dept: INTERNAL MEDICINE CLINIC | Facility: CLINIC | Age: 79
End: 2019-11-27
Payer: MEDICARE

## 2019-11-27 VITALS
TEMPERATURE: 98 F | DIASTOLIC BLOOD PRESSURE: 56 MMHG | SYSTOLIC BLOOD PRESSURE: 124 MMHG | HEIGHT: 67 IN | BODY MASS INDEX: 26.9 KG/M2 | WEIGHT: 171.38 LBS | RESPIRATION RATE: 16 BRPM | HEART RATE: 84 BPM

## 2019-11-27 DIAGNOSIS — M54.16 LUMBAR RADICULAR PAIN: ICD-10-CM

## 2019-11-27 DIAGNOSIS — M79.604 RIGHT LEG PAIN: Primary | ICD-10-CM

## 2019-11-27 PROCEDURE — 99213 OFFICE O/P EST LOW 20 MIN: CPT | Performed by: INTERNAL MEDICINE

## 2019-11-27 NOTE — PROGRESS NOTES
Ace Masterson is a 70year old male.   Patient presents with:  Leg Pain: rm 4 DO: pt c/o RT leg pain  Injection: received Voltaren injections in [place]      HPI:     Patient c/o right buttock pain radiating to his right leg since his recent trip OMEPRAZOLE 40 MG Oral Capsule Delayed Release TAKE ONE CAPSULE BY MOUTH TWICE DAILY 180 capsule 3   • acetaminophen 500 MG Oral Tab Take 1,000 mg by mouth 3 (three) times daily. • Vitamin D3 2000 units Oral Cap Take 2,000 Units by mouth daily.      • radicular pain - pt just started medrol diana so advised to finish the full course. If symptoms do not resolve, he will do PT again. F/u if symptoms persist or worsen    No orders of the defined types were placed in this encounter.       Meds & Refills for th

## 2019-11-29 DIAGNOSIS — I10 ESSENTIAL HYPERTENSION: Primary | ICD-10-CM

## 2019-12-01 RX ORDER — LISINOPRIL 40 MG/1
TABLET ORAL
Qty: 90 TABLET | Refills: 1 | Status: SHIPPED | OUTPATIENT
Start: 2019-12-01 | End: 2020-08-21

## 2019-12-02 NOTE — TELEPHONE ENCOUNTER
Last ov:  11/27/19    Last physical:  3/1/19    Last refill:  LISINOPRIL 40 MG Oral Tab  9/3/19    Quantity:  90, 0 refill    Related labs: 8/2/19 CMP    Future appointments:  3/26/2020    Protocol:  passed    APPROVE or DENY   approve

## 2019-12-05 RX ORDER — GABAPENTIN 300 MG/1
CAPSULE ORAL
Qty: 270 CAPSULE | Refills: 0 | Status: SHIPPED | OUTPATIENT
Start: 2019-12-05 | End: 2020-03-02

## 2019-12-05 RX ORDER — SIMVASTATIN 40 MG
TABLET ORAL
Qty: 90 TABLET | Refills: 0 | Status: SHIPPED | OUTPATIENT
Start: 2019-12-05 | End: 2020-03-02

## 2019-12-05 NOTE — TELEPHONE ENCOUNTER
Last Ov: 11/27/19, TB, acute  Upcoming appt: 3/26/20, TB  Last labs: A1c, Lipid, CMP 8/2/19  Last Rx: gabapentin 300mg, #270, 0R 9/10/19    Per Protocol, gabapentin not on protocol. Rx pending. Other Rx passed, refill sent.

## 2019-12-09 ENCOUNTER — OFFICE VISIT (OUTPATIENT)
Dept: PHYSICAL THERAPY | Age: 79
End: 2019-12-09
Attending: INTERNAL MEDICINE
Payer: MEDICARE

## 2019-12-09 DIAGNOSIS — M54.16 LUMBAR RADICULAR PAIN: ICD-10-CM

## 2019-12-09 DIAGNOSIS — M79.604 RIGHT LEG PAIN: ICD-10-CM

## 2019-12-09 PROCEDURE — 97110 THERAPEUTIC EXERCISES: CPT

## 2019-12-09 PROCEDURE — 97140 MANUAL THERAPY 1/> REGIONS: CPT

## 2019-12-09 PROCEDURE — 97161 PT EVAL LOW COMPLEX 20 MIN: CPT

## 2019-12-09 NOTE — PROGRESS NOTES
INITIAL EVALUATION:   Referring Physician: Dr. Carey Ribeiro  Diagnosis:   -Right leg pain   -Lumbar radicular pain        Date of Service: 12/9/2019     PATIENT SUMMARY    Comfort Lagunas is a 70year old male; he has been referred by Dr. Carey Ribeiro; pr follow-up visits scheduled    ASSESSMENT:   -70year old male presenting with lower back pain with distal LE complaint; presenting with flexion bias; impairments in mobility thoracolumbar spine; mild complexity; within session response of decrease complain [phone]    Sincerely,  Electronically signed by therapist: Richard Bowen, PT      Certification From: 42/7/9444  To:3/8/2020

## 2019-12-11 ENCOUNTER — OFFICE VISIT (OUTPATIENT)
Dept: PHYSICAL THERAPY | Age: 79
End: 2019-12-11
Attending: INTERNAL MEDICINE
Payer: MEDICARE

## 2019-12-11 DIAGNOSIS — M54.16 LUMBAR RADICULAR PAIN: ICD-10-CM

## 2019-12-11 DIAGNOSIS — M79.604 RIGHT LEG PAIN: ICD-10-CM

## 2019-12-11 PROCEDURE — 97110 THERAPEUTIC EXERCISES: CPT

## 2019-12-11 PROCEDURE — 97140 MANUAL THERAPY 1/> REGIONS: CPT

## 2019-12-11 NOTE — PROGRESS NOTES
Dx:      -Right leg pain   -Lumbar radicular pain       Authorized # of Visits:  No prior authorization is required per appointment notes         Next MD visit: none scheduled  Fall Risk: standard           Precautions: n/a             Subjective:  Patient 2     -Joint mobilization consisting of accessory joint mobilization in prone of the lumbar and thoracic spine for mobility and symptom modulation   -Joint mobilization consisting of physiological rotation localization @ the lower thoracic and lumbar segme easing  -Extension aggravating  Passive/segmental/accessory movement testing:    hypomobility present globally thoracolumbar spine; mild symptom provocation PA L3/L4   Special tests:    -SLR 70 degrees bilaterally limited by soft tissue tension    Today’s spine  -Extension restoration  -Assessment of hip extension and related flexibility  -Home program progression    Education or treatment limitation: None  Rehab Potential:good    Charges:   PT Eval Low Complexity  Manual Therapy x 1  Therapeutic excercise

## 2019-12-13 ENCOUNTER — PATIENT MESSAGE (OUTPATIENT)
Dept: INTERNAL MEDICINE CLINIC | Facility: CLINIC | Age: 79
End: 2019-12-13

## 2019-12-13 NOTE — TELEPHONE ENCOUNTER
From: Vaibhav Andino  To: Jill Leo MD  Sent: 12/13/2019 7:03 AM CST  Subject: Prescription Question    Can you confirm what mg of the following prescriptions I should be taking.  I believe they have changed over time and Ethan has different

## 2019-12-16 ENCOUNTER — OFFICE VISIT (OUTPATIENT)
Dept: PHYSICAL THERAPY | Age: 79
End: 2019-12-16
Attending: INTERNAL MEDICINE
Payer: MEDICARE

## 2019-12-16 DIAGNOSIS — M54.16 LUMBAR RADICULAR PAIN: ICD-10-CM

## 2019-12-16 DIAGNOSIS — M79.604 RIGHT LEG PAIN: ICD-10-CM

## 2019-12-16 PROCEDURE — 97110 THERAPEUTIC EXERCISES: CPT

## 2019-12-16 PROCEDURE — 97140 MANUAL THERAPY 1/> REGIONS: CPT

## 2019-12-16 NOTE — PROGRESS NOTES
Dx:      -Right leg pain   -Lumbar radicular pain       Authorized # of Visits:  No prior authorization is required per appointment notes         Next MD visit: none scheduled  Fall Risk: standard           Precautions: n/a             Subjective: Doing go mobilization within right sidelying   -Joint mobilization consisting of physiological rotation localization @ the lower thoracic and lumbar segments -Home program instruction in sideling self rotation mobilization in side lying bilaterally  -Roderick rhoades level of function:  -Doing well   Pt goals include:  -Not have this pain; standing without issue    OBJECTIVE:   Observation/gait/posture:   -Gait appears steady; mildly forward flexed  Neurological exam:    -LE Myotomes appear strong/symmtrical; MSR anne minutes such as within context of work of travel without symptom provocation  -Able to attend to walking of 60 minutes within the context of daily activity, work, travel.     Frequency / Duration:   -Two times per week 4 weeks  -Flexion biased activities wi

## 2019-12-18 ENCOUNTER — OFFICE VISIT (OUTPATIENT)
Dept: PHYSICAL THERAPY | Age: 79
End: 2019-12-18
Attending: INTERNAL MEDICINE
Payer: MEDICARE

## 2019-12-18 DIAGNOSIS — M79.604 RIGHT LEG PAIN: ICD-10-CM

## 2019-12-18 DIAGNOSIS — M54.16 LUMBAR RADICULAR PAIN: ICD-10-CM

## 2019-12-18 PROCEDURE — 97140 MANUAL THERAPY 1/> REGIONS: CPT

## 2019-12-18 PROCEDURE — 97110 THERAPEUTIC EXERCISES: CPT

## 2019-12-18 NOTE — PROGRESS NOTES
Dx:      -Right leg pain   -Lumbar radicular pain       Authorized # of Visits:  No prior authorization is required per appointment notes         Next MD visit: none scheduled  Fall Risk: standard           Precautions: n/a             Subjective:  Patient with home program; return for follow-up in 2 weeks; assess symptoms; re-assess tolerance to extension; assess response of home program.  Date: 12/11/2019  Tx#:  2   Date: 12/16/2019  Tx#:  3   Date: 12/18/2019  Tx#:  4   -Joint mobilization consisting of a Referring Physician: Dr. Page Monteiro  Diagnosis:   -Right leg pain   -Lumbar radicular pain        Date of Service: 12/9/2019     PATIENT SUMMARY    Alberto Matthews is a 70year old male; he has been referred by Dr. Page Monteiro; prior onset July of this scheduled    ASSESSMENT:   -70year old male presenting with lower back pain with distal LE complaint; presenting with flexion bias; impairments in mobility thoracolumbar spine; mild complexity; within session response of decrease complaint; will likely ne [phone]    Sincerely,  Electronically signed by therapist: Morenita Rivera, PT      Certification From: 86/0/3471  To:3/8/2020

## 2019-12-26 RX ORDER — AMLODIPINE BESYLATE 10 MG/1
TABLET ORAL
Qty: 90 TABLET | Refills: 0 | Status: SHIPPED | OUTPATIENT
Start: 2019-12-26 | End: 2020-03-24

## 2019-12-30 ENCOUNTER — TELEPHONE (OUTPATIENT)
Dept: SURGERY | Facility: CLINIC | Age: 79
End: 2019-12-30

## 2019-12-30 NOTE — TELEPHONE ENCOUNTER
Patient stated he needs a refill on the following prescription was given by another physician patient stated Dr. Cathryn Alvarado is aware of medication  :     finasteride (PROSCAR) 5 MG Oral Tab       Sig:   Take 5 mg by mouth daily.      Route:   Oral     Class:

## 2020-01-02 RX ORDER — FINASTERIDE 5 MG/1
5 TABLET, FILM COATED ORAL DAILY
Qty: 90 TABLET | Refills: 3 | Status: SHIPPED | OUTPATIENT
Start: 2020-01-02 | End: 2020-12-17

## 2020-01-02 RX ORDER — FINASTERIDE 5 MG/1
5 TABLET, FILM COATED ORAL DAILY
Qty: 90 TABLET | Refills: 3 | Status: CANCELLED | OUTPATIENT
Start: 2020-01-02 | End: 2020-04-01

## 2020-01-02 NOTE — TELEPHONE ENCOUNTER
Patient is requesting a refill on finasteride last office visit was on 6/27/19 with Dr Karla Bernstein

## 2020-01-03 ENCOUNTER — OFFICE VISIT (OUTPATIENT)
Dept: PHYSICAL THERAPY | Age: 80
End: 2020-01-03
Attending: INTERNAL MEDICINE
Payer: MEDICARE

## 2020-01-03 PROCEDURE — 97110 THERAPEUTIC EXERCISES: CPT

## 2020-01-03 PROCEDURE — 97140 MANUAL THERAPY 1/> REGIONS: CPT

## 2020-01-03 NOTE — PROGRESS NOTES
Progress Summary    Pt has attended 5 visits in Physical Therapy.     Dx:      -Right leg pain   -Lumbar radicular pain       Authorized # of Visits:  No prior authorization is required per appointment notes         Next MD visit: none scheduled  Fall Risk walking of 60 minutes within the context of daily activity, work, travel.     Plan:  Two times per week 4 weeks; address loss of hip extension; extensor strength; hip strength/mobility  Date: 12/11/2019  Tx#:  2   Date: 12/16/2019  Tx#:  3   Date: 12/18/201 min  -TM 1.8 m,ph x 15                                    HEP:  See above  Charges:   Manual Therapy x 2 (25 min)  Therapeutic excercise x  1 (10 min)  Total Timed Treatment: 30 min    Total Treatment Time: 30  min         INITIAL EVALUATION:   Referring P LTR); provided accessory mobilization thoracolumbar spine; performed trunk extension without complaint following intervention provided; handout provided; discussed suggested parameters of above mention home program; follow-up visits scheduled    ASSESSMENT findings, precautions, and treatment options and has agreed to actively participate in planning and for this course of care.     Thank you for your referral.  If you have any questions, please contact me at Dept: [phone]    Sincerely,  Electronically s

## 2020-01-07 ENCOUNTER — OFFICE VISIT (OUTPATIENT)
Dept: PHYSICAL THERAPY | Age: 80
End: 2020-01-07
Attending: INTERNAL MEDICINE
Payer: MEDICARE

## 2020-01-07 PROCEDURE — 97110 THERAPEUTIC EXERCISES: CPT

## 2020-01-07 PROCEDURE — 97140 MANUAL THERAPY 1/> REGIONS: CPT

## 2020-01-07 NOTE — PROGRESS NOTES
Progress Summary    Pt has attended 5 visits in Physical Therapy.     Dx:      -Right leg pain   -Lumbar radicular pain       Authorized # of Visits:  No prior authorization is required per appointment notes         Next MD visit: none scheduled  Fall Risk 12/18/2019  Tx#:  4 Date:   1/3/2020  Tx#:  5   Date:   1/7/2020  Tx#:  6     -Joint mobilization consisting of accessory joint mobilization in prone of the lumbar and thoracic spine for mobility and symptom modulation -Joint mobilization consisting of rot required cueing to recall previous instruction  -Joint mobilization to bilateral hip consisting of long axis distraction  -Supine bridge partially range 5 second hold x 12 reps x 3 sets  -Supine unilateral bend knee fall out - manual resistance   -TM 1.8 m aggravating  Passive/segmental/accessory movement testing:    hypomobility present globally thoracolumbar spine; mild symptom provocation PA L3/L4   Special tests:    -SLR 70 degrees bilaterally limited by soft tissue tension    Today’s Treatment and Respo restoration  -Assessment of hip extension and related flexibility  -Home program progression    Education or treatment limitation: None  Rehab Potential:good    Charges:   PT Eval Low Complexity  Manual Therapy x 1  Therapeutic excercise x 1  Total Timed T

## 2020-01-21 ENCOUNTER — OFFICE VISIT (OUTPATIENT)
Dept: PHYSICAL THERAPY | Age: 80
End: 2020-01-21
Attending: INTERNAL MEDICINE
Payer: MEDICARE

## 2020-01-21 PROCEDURE — 97140 MANUAL THERAPY 1/> REGIONS: CPT

## 2020-01-21 PROCEDURE — 97110 THERAPEUTIC EXERCISES: CPT

## 2020-01-23 ENCOUNTER — OFFICE VISIT (OUTPATIENT)
Dept: PHYSICAL THERAPY | Age: 80
End: 2020-01-23
Attending: INTERNAL MEDICINE
Payer: MEDICARE

## 2020-01-23 PROCEDURE — 97110 THERAPEUTIC EXERCISES: CPT

## 2020-01-23 PROCEDURE — 97140 MANUAL THERAPY 1/> REGIONS: CPT

## 2020-01-23 NOTE — PROGRESS NOTES
Dx:      -Right leg pain   -Lumbar radicular pain       Authorized # of Visits:  No prior authorization is required per appointment notes         Next MD visit: none scheduled  Fall Risk: standard           Precautions: n/a             Subjective:   Catherine Physician: Dr. Tamera Baltazar  Diagnosis:   -Right leg pain   -Lumbar radicular pain        Date of Service: 12/9/2019     PATIENT SUMMARY    Miri Madison is a 70year old male; he has been referred by Dr. Tamera Baltazar; prior onset July of this year; seen b -78 year old male presenting with lower back pain with distal LE complaint; presenting with flexion bias; impairments in mobility thoracolumbar spine; mild complexity; within session response of decrease complaint; will likely need review of today's pres therapist: Ethan Mary, PT      Certification From: 14/0/7505  To:3/8/2020

## 2020-01-23 NOTE — PROGRESS NOTES
Progress Summary    Pt has attended 5 visits in Physical Therapy.     Dx:      -Right leg pain   -Lumbar radicular pain       Authorized # of Visits:  No prior authorization is required per appointment notes         Next MD visit: none scheduled  Fall Risk modulation -Joint mobilization consisting of rotational and gapping mobilization within right sidelying Joint mobilization consisting of rotational and gapping mobilization within right sidelying -Supine single knee to chest; supine bridge  -Passive stretc cueing to recall previous instruction  -Joint mobilization to bilateral hip consisting of long axis distraction  -Supine bridge partially range 5 second hold x 12 reps x 3 sets  -Supine unilateral bend knee fall out - manual resistance -Joint mobilization Observation/gait/posture:   -Gait appears steady; mildly forward flexed  Neurological exam:    -LE Myotomes appear strong/symmtrical; MSR patellar and achilles: 1+; sensation intact gross assessed  AROM/overpressure:   -Flexion easing  -Extension aggrava minutes within the context of daily activity, work, travel.     Frequency / Duration:   -Two times per week 4 weeks  -Flexion biased activities with trunk strengthening progression  -Joint mobilization to thoracolumbar spine  -Extension restoration  -Assess

## 2020-01-28 ENCOUNTER — OFFICE VISIT (OUTPATIENT)
Dept: PHYSICAL THERAPY | Age: 80
End: 2020-01-28
Attending: INTERNAL MEDICINE
Payer: MEDICARE

## 2020-01-28 PROCEDURE — 97140 MANUAL THERAPY 1/> REGIONS: CPT

## 2020-01-28 PROCEDURE — 97110 THERAPEUTIC EXERCISES: CPT

## 2020-01-30 ENCOUNTER — OFFICE VISIT (OUTPATIENT)
Dept: PHYSICAL THERAPY | Age: 80
End: 2020-01-30
Attending: INTERNAL MEDICINE
Payer: MEDICARE

## 2020-01-30 PROCEDURE — 97110 THERAPEUTIC EXERCISES: CPT

## 2020-01-30 NOTE — PROGRESS NOTES
Progress Summary    Pt has attended 9 visits Physical Therapy.     Dx:      -Right leg pain   -Lumbar radicular pain       Authorized # of Visits:  No prior authorization is required per appointment notes         Next MD visit: none scheduled  Fall Risk: s sets  -Standing backward lunge with UE support x 8 reps sets  -Standing heal raise x 12 reps x  Sets  -Standing bilateral shoulder extension with gluteal contraction: 10# B UE 5 second hold x 10 reps @ pulley  -Standing B  shoulder extension: 10 # with alt radicular pain        Date of Service: 12/9/2019     PATIENT SUMMARY    Layman Lenin is a 70year old male; he has been referred by Dr. White Notice; prior onset July of this year; seen by this therapist in the past; reports prior intervention as help LE complaint; presenting with flexion bias; impairments in mobility thoracolumbar spine; mild complexity; within session response of decrease complaint; will likely need review of today's prescribed home program; while lifting reported as suggested onset; To:3/8/2020

## 2020-02-04 ENCOUNTER — OFFICE VISIT (OUTPATIENT)
Dept: PHYSICAL THERAPY | Age: 80
End: 2020-02-04
Attending: INTERNAL MEDICINE
Payer: MEDICARE

## 2020-02-04 PROCEDURE — 97110 THERAPEUTIC EXERCISES: CPT

## 2020-02-04 NOTE — PROGRESS NOTES
Dx:      -Right leg pain   -Lumbar radicular pain       Authorized # of Visits:  No prior authorization is required per appointment notes         Next MD visit: none scheduled  Fall Risk: standard           Precautions: n/a             Subjective:  Elina Lee sets  -Standing backward lunge with UE support x 5 x 2 sets  -Standing heal raise x 8 reps x 3 sets  -Supine hooklying hip fall out x 20 reps x 2 sets each LE with manual resistance  -S/L 1-2 joint hip flexor stretch:  30 second hold x 3 reps B LE  -S/L gl sets  -Standing backward lunge with UE support x 10 reps sets  -Standing heal raise x 12 reps x  Sets  -Standing bilateral shoulder extension with gluteal contraction: 10# B UE 5 second hold x 10 reps @ pulley   -Standing B  shoulder extension: 10 # with a zac)  -Easing:  Sitting, laying down  Pt describes pain level:  -4/10  Current functional limitations include:  -Standing see above  Mackenzie Mariajose describes prior level of function:  -Doing well   Pt goals include:  -Not have this pain; standing without is visits)  -Demonstrate appropriate performance with of home program and progression for the purpose of self care and management  -Able to standing for periods of 60 minutes such as within context of work of travel without symptom provocation  -Able to [employer]

## 2020-02-06 ENCOUNTER — OFFICE VISIT (OUTPATIENT)
Dept: PHYSICAL THERAPY | Age: 80
End: 2020-02-06
Attending: INTERNAL MEDICINE
Payer: MEDICARE

## 2020-02-06 PROCEDURE — 97110 THERAPEUTIC EXERCISES: CPT

## 2020-02-06 NOTE — PROGRESS NOTES
Dx:      -Right leg pain   -Lumbar radicular pain       Authorized # of Visits:  No prior authorization is required per appointment notes         Next MD visit: none scheduled  Fall Risk: standard           Precautions: n/a             Subjective:  Valeria Bahena resistance 20 reps x 3 sets  -Standing backward lunge with UE support x 5 x 2 sets  -Standing heal raise x 8 reps x 3 sets  -Supine hooklying hip fall out x 20 reps x 2 sets each LE with manual resistance  -S/L 1-2 joint hip flexor stretch:  30 second hold resistance 20 reps x 3 sets  -Standing backward lunge with UE support x 10 reps sets  -Standing heal raise x 12 reps x  Sets  -Standing bilateral shoulder extension with gluteal contraction: 10# B UE 5 second hold x 10 reps @ pulley   -Standing B  shoulder Ermelinda Pena  Diagnosis:   -Right leg pain   -Lumbar radicular pain        Date of Service: 12/9/2019     PATIENT SUMMARY    Marilyn Godoy is a 70year old male; he has been referred by Dr. Ermelinda Pena; prior onset July of this year; seen by this therapis male presenting with lower back pain with distal LE complaint; presenting with flexion bias; impairments in mobility thoracolumbar spine; mild complexity; within session response of decrease complaint; will likely need review of today's prescribed home pro Ginna Ewing, PT      Certification From: 49/9/4943  To:3/8/2020

## 2020-02-11 ENCOUNTER — OFFICE VISIT (OUTPATIENT)
Dept: PHYSICAL THERAPY | Age: 80
End: 2020-02-11
Attending: INTERNAL MEDICINE
Payer: MEDICARE

## 2020-02-11 PROCEDURE — 97110 THERAPEUTIC EXERCISES: CPT

## 2020-02-11 NOTE — PROGRESS NOTES
Dx:      -Right leg pain   -Lumbar radicular pain       Authorized # of Visits:  No prior authorization is required per appointment notes         Next MD visit: none scheduled  Fall Risk: standard           Precautions: n/a             Subjective:  Yina Lord extension: 10 # with alternating step -up 6 inch step with manual contacts for safety  -Supine hooklying hip fall out x 20 reps x 2 sets each LE with manual resistance  -S/L 1-2 joint hip flexor stretch:  30 second hold x 3 reps B LE  -S/L gluteus medius w traction 5 second hold x 15 reps -Therapeutic exercise  Single leg press @ shuttle level 7.0 resistance 20 reps x 3 sets  --Standing backward lunge with UE support x 12 reps sets  -Standing heal raise x 15 reps x  Sets  -Standing bilateral shoulder extensi min    Total Treatment Time: 40 min       INITIAL EVALUATION:   Referring Physician: Dr. Brooke Becker  Diagnosis:   -Right leg pain   -Lumbar radicular pain        Date of Service: 12/9/2019     PATIENT SUMMARY    Jaqui Cisneros is a 70year old male; h of above mention home program; follow-up visits scheduled    ASSESSMENT:   -78 year old male presenting with lower back pain with distal LE complaint; presenting with flexion bias; impairments in mobility thoracolumbar spine; mild complexity; within sessio contact me at Dept: [phone]    Sincerely,  Electronically signed by therapist: Humaira Rodriguez, PT      Certification From: 51/1/1202  To:3/8/2020

## 2020-02-13 ENCOUNTER — OFFICE VISIT (OUTPATIENT)
Dept: PHYSICAL THERAPY | Age: 80
End: 2020-02-13
Attending: INTERNAL MEDICINE
Payer: MEDICARE

## 2020-02-13 PROCEDURE — 97110 THERAPEUTIC EXERCISES: CPT

## 2020-02-13 NOTE — PROGRESS NOTES
Discharge Summary    Pt has attended 14 visits in Physical Therapy.     Dx:      -Right leg pain   -Lumbar radicular pain       Authorized # of Visits:  No prior authorization is required per appointment notes         Next MD visit: none scheduled  Fall Ri support x 5 x 2 sets  -Standing heal raise x 8 reps x 3 sets  -Supine hooklying hip fall out x 20 reps x 2 sets each LE with manual resistance  -S/L 1-2 joint hip flexor stretch:  30 second hold x 3 reps B LE  -S/L gluteus medius with knee flexion heals to support x 10 reps sets  -Standing heal raise x 12 reps x  Sets  -Standing bilateral shoulder extension with gluteal contraction: 10# B UE 5 second hold x 10 reps @ pulley   -Standing B  shoulder extension: 10 # with alternating step -up 6 inch step with ma out x 20 reps x 2 sets each LE with manual   S/L gluteus medius with knee flexion heals together with manual resistance  Supine bridge with facilitory traction 5 second hold x 15 reps -Therapeutic exercise  Single leg press @ shuttle level 7.0 resistance 2 as in the airport)  -Easing:  Sitting, laying down  Pt describes pain level:  -4/10  Current functional limitations include:  -Standing see above  Giovanna Cyphers describes prior level of function:  -Doing well   Pt goals include:  -Not have this pain; standing Goals:    (8 visits)  -Demonstrate appropriate performance with of home program and progression for the purpose of self care and management  -Able to standing for periods of 60 minutes such as within context of work of travel without symptom provocation

## 2020-03-02 RX ORDER — GABAPENTIN 300 MG/1
CAPSULE ORAL
Qty: 270 CAPSULE | Refills: 0 | Status: SHIPPED | OUTPATIENT
Start: 2020-03-02 | End: 2020-05-29

## 2020-03-02 RX ORDER — SIMVASTATIN 40 MG
TABLET ORAL
Qty: 90 TABLET | Refills: 0 | Status: SHIPPED | OUTPATIENT
Start: 2020-03-02 | End: 2020-05-29

## 2020-03-02 NOTE — TELEPHONE ENCOUNTER
Last Ov: 11/27/19, TB, acute  Upcoming appt: 3/26/20, TB  Last labs: A1c, Lipid, CMP 8/2/19  Last Rx: gabapentin 300mg, #270, 0R 12/5/19    Per Protocol, gabapentin not on protocol. Rx pending. Other Rx passed, refill sent.

## 2020-03-05 ENCOUNTER — LAB ENCOUNTER (OUTPATIENT)
Dept: LAB | Age: 80
End: 2020-03-05
Attending: INTERNAL MEDICINE
Payer: MEDICARE

## 2020-03-05 DIAGNOSIS — K21.9 GASTROESOPHAGEAL REFLUX DISEASE WITHOUT ESOPHAGITIS: ICD-10-CM

## 2020-03-05 DIAGNOSIS — E78.2 MIXED HYPERLIPIDEMIA: ICD-10-CM

## 2020-03-05 DIAGNOSIS — I10 ESSENTIAL HYPERTENSION: ICD-10-CM

## 2020-03-05 DIAGNOSIS — R73.9 BLOOD GLUCOSE ELEVATED: ICD-10-CM

## 2020-03-05 LAB
ALBUMIN SERPL-MCNC: 4.1 G/DL (ref 3.4–5)
ALBUMIN/GLOB SERPL: 1.1 {RATIO} (ref 1–2)
ALP LIVER SERPL-CCNC: 60 U/L (ref 45–117)
ALT SERPL-CCNC: 27 U/L (ref 16–61)
ANION GAP SERPL CALC-SCNC: 6 MMOL/L (ref 0–18)
AST SERPL-CCNC: 18 U/L (ref 15–37)
BASOPHILS # BLD AUTO: 0.07 X10(3) UL (ref 0–0.2)
BASOPHILS NFR BLD AUTO: 0.9 %
BILIRUB SERPL-MCNC: 0.9 MG/DL (ref 0.1–2)
BUN BLD-MCNC: 32 MG/DL (ref 7–18)
BUN/CREAT SERPL: 28.8 (ref 10–20)
CALCIUM BLD-MCNC: 9.2 MG/DL (ref 8.5–10.1)
CHLORIDE SERPL-SCNC: 108 MMOL/L (ref 98–112)
CHOLEST SMN-MCNC: 126 MG/DL (ref ?–200)
CO2 SERPL-SCNC: 29 MMOL/L (ref 21–32)
CREAT BLD-MCNC: 1.11 MG/DL (ref 0.7–1.3)
DEPRECATED RDW RBC AUTO: 34.8 FL (ref 35.1–46.3)
EOSINOPHIL # BLD AUTO: 0.95 X10(3) UL (ref 0–0.7)
EOSINOPHIL NFR BLD AUTO: 11.7 %
ERYTHROCYTE [DISTWIDTH] IN BLOOD BY AUTOMATED COUNT: 16.4 % (ref 11–15)
EST. AVERAGE GLUCOSE BLD GHB EST-MCNC: 111 MG/DL (ref 68–126)
GLOBULIN PLAS-MCNC: 3.6 G/DL (ref 2.8–4.4)
GLUCOSE BLD-MCNC: 111 MG/DL (ref 70–99)
HBA1C MFR BLD HPLC: 5.5 % (ref ?–5.7)
HCT VFR BLD AUTO: 36.7 % (ref 39–53)
HDLC SERPL-MCNC: 30 MG/DL (ref 40–59)
HGB BLD-MCNC: 11.2 G/DL (ref 13–17.5)
IMM GRANULOCYTES # BLD AUTO: 0.02 X10(3) UL (ref 0–1)
IMM GRANULOCYTES NFR BLD: 0.2 %
LDLC SERPL CALC-MCNC: 68 MG/DL (ref ?–100)
LYMPHOCYTES # BLD AUTO: 2.25 X10(3) UL (ref 1–4)
LYMPHOCYTES NFR BLD AUTO: 27.6 %
M PROTEIN MFR SERPL ELPH: 7.7 G/DL (ref 6.4–8.2)
MCH RBC QN AUTO: 20 PG (ref 26–34)
MCHC RBC AUTO-ENTMCNC: 30.5 G/DL (ref 31–37)
MCV RBC AUTO: 65.7 FL (ref 80–100)
MONOCYTES # BLD AUTO: 0.76 X10(3) UL (ref 0.1–1)
MONOCYTES NFR BLD AUTO: 9.3 %
NEUTROPHILS # BLD AUTO: 4.09 X10 (3) UL (ref 1.5–7.7)
NEUTROPHILS # BLD AUTO: 4.09 X10(3) UL (ref 1.5–7.7)
NEUTROPHILS NFR BLD AUTO: 50.3 %
NONHDLC SERPL-MCNC: 96 MG/DL (ref ?–130)
OSMOLALITY SERPL CALC.SUM OF ELEC: 304 MOSM/KG (ref 275–295)
PATIENT FASTING Y/N/NP: YES
PATIENT FASTING Y/N/NP: YES
PLATELET # BLD AUTO: 258 10(3)UL (ref 150–450)
POTASSIUM SERPL-SCNC: 3.9 MMOL/L (ref 3.5–5.1)
RBC # BLD AUTO: 5.59 X10(6)UL (ref 3.8–5.8)
SODIUM SERPL-SCNC: 143 MMOL/L (ref 136–145)
TRIGL SERPL-MCNC: 139 MG/DL (ref 30–149)
TSI SER-ACNC: 1.19 MIU/ML (ref 0.36–3.74)
VLDLC SERPL CALC-MCNC: 28 MG/DL (ref 0–30)
WBC # BLD AUTO: 8.1 X10(3) UL (ref 4–11)

## 2020-03-05 PROCEDURE — 84443 ASSAY THYROID STIM HORMONE: CPT

## 2020-03-05 PROCEDURE — 80053 COMPREHEN METABOLIC PANEL: CPT

## 2020-03-05 PROCEDURE — 83036 HEMOGLOBIN GLYCOSYLATED A1C: CPT

## 2020-03-05 PROCEDURE — 80061 LIPID PANEL: CPT

## 2020-03-05 PROCEDURE — 85025 COMPLETE CBC W/AUTO DIFF WBC: CPT

## 2020-03-16 ENCOUNTER — TELEPHONE (OUTPATIENT)
Dept: INTERNAL MEDICINE CLINIC | Facility: CLINIC | Age: 80
End: 2020-03-16

## 2020-03-24 RX ORDER — AMLODIPINE BESYLATE 10 MG/1
TABLET ORAL
Qty: 90 TABLET | Refills: 0 | Status: SHIPPED | OUTPATIENT
Start: 2020-03-24 | End: 2020-06-19

## 2020-05-26 ENCOUNTER — TELEPHONE (OUTPATIENT)
Dept: SURGERY | Facility: CLINIC | Age: 80
End: 2020-05-26

## 2020-05-29 RX ORDER — SIMVASTATIN 40 MG
TABLET ORAL
Qty: 90 TABLET | Refills: 0 | Status: SHIPPED | OUTPATIENT
Start: 2020-05-29 | End: 2020-08-25

## 2020-05-29 RX ORDER — GABAPENTIN 300 MG/1
CAPSULE ORAL
Qty: 270 CAPSULE | Refills: 0 | Status: SHIPPED | OUTPATIENT
Start: 2020-05-29 | End: 2020-08-25

## 2020-05-29 NOTE — TELEPHONE ENCOUNTER
Last OV: 11/27/19 acute f/u    Future Appointments: Next PE due 3/2020   Date Time Provider Sadia Webb   7/1/2020  9:00 AM Meryle Barrs, MD [facility]   7/2/2020  9:40 AM Ermias Marks MD [facility]   8/17/2020  7:10 AM Gagandeep Severino

## 2020-06-19 RX ORDER — AMLODIPINE BESYLATE 10 MG/1
TABLET ORAL
Qty: 90 TABLET | Refills: 0 | Status: SHIPPED | OUTPATIENT
Start: 2020-06-19 | End: 2020-09-14

## 2020-06-19 NOTE — TELEPHONE ENCOUNTER
PASSED per protocol, refill sent. Last PE 3.1.19-PE scheduled for 7. 1.20   Future Appointments   Date Time Provider Sadia Webb   7/1/2020  9:00 AM Meryle Barrs, MD [facility]   7/30/2020  9:10 AM Ermias Marks MD [facility]   8/17

## 2020-06-25 RX ORDER — HYDROCHLOROTHIAZIDE 25 MG/1
TABLET ORAL
Qty: 90 TABLET | Refills: 0 | Status: SHIPPED | OUTPATIENT
Start: 2020-06-25 | End: 2020-09-22

## 2020-06-29 RX ORDER — OMEPRAZOLE 40 MG/1
CAPSULE, DELAYED RELEASE ORAL
Qty: 180 CAPSULE | Refills: 3 | Status: SHIPPED | OUTPATIENT
Start: 2020-06-29 | End: 2021-05-05 | Stop reason: CLARIF

## 2020-06-29 NOTE — TELEPHONE ENCOUNTER
LOV:11/27/19 TB  FOV:7/1/20  LAST RX:7/1/19 40 mg take 1 cap twice a day 180 caps 3 refills   LAST LABS: 3/5/20 a1c,cmp,cbc,tsh,lipids   PER PROTOCOL: to provider

## 2020-07-01 ENCOUNTER — OFFICE VISIT (OUTPATIENT)
Dept: INTERNAL MEDICINE CLINIC | Facility: CLINIC | Age: 80
End: 2020-07-01
Payer: MEDICARE

## 2020-07-01 VITALS
HEART RATE: 88 BPM | RESPIRATION RATE: 18 BRPM | WEIGHT: 171.81 LBS | DIASTOLIC BLOOD PRESSURE: 70 MMHG | HEIGHT: 67 IN | SYSTOLIC BLOOD PRESSURE: 132 MMHG | TEMPERATURE: 97 F | BODY MASS INDEX: 26.97 KG/M2

## 2020-07-01 DIAGNOSIS — M54.16 LUMBAR RADICULOPATHY: ICD-10-CM

## 2020-07-01 DIAGNOSIS — I10 ESSENTIAL HYPERTENSION: ICD-10-CM

## 2020-07-01 DIAGNOSIS — Z00.00 ENCOUNTER FOR ANNUAL HEALTH EXAMINATION: Primary | ICD-10-CM

## 2020-07-01 DIAGNOSIS — E78.00 PURE HYPERCHOLESTEROLEMIA: ICD-10-CM

## 2020-07-01 DIAGNOSIS — M79.604 RIGHT LEG PAIN: ICD-10-CM

## 2020-07-01 DIAGNOSIS — K21.9 GASTROESOPHAGEAL REFLUX DISEASE WITHOUT ESOPHAGITIS: ICD-10-CM

## 2020-07-01 PROCEDURE — G0439 PPPS, SUBSEQ VISIT: HCPCS | Performed by: INTERNAL MEDICINE

## 2020-07-01 NOTE — PATIENT INSTRUCTIONS
Mik Valero's SCREENING SCHEDULE   Tests on this list are recommended by your physician but may not be covered, or covered at this frequency, by your insurer. Please check with your insurance carrier before scheduling to verify coverage.     PRE • Men who are 73-68 years old and have smoked more than 100 cigarettes in their lifetime   • Anyone with a family history    Colorectal Cancer Screening Covered up to Age 76     Colonoscopy Screen   Covered every 10 years- more often if abnormal There ar HepB virus carrier   Homosexual men   Illicit injectable drug abusers     Tetanus Toxoid- Only covered with a cut with metal- TD and TDaP Not covered by Medicare Part B) No orders found for this or any previous visit.  This may be covered with your prescrip

## 2020-07-01 NOTE — PROGRESS NOTES
HPI:   Layjocelynn Phelps is a 70year old male who presents for a Medicare Subsequent Annual Wellness visit (Pt already had Initial Annual Wellness). Patient here for wellness. He carried a bag of charcoal 3 days ago and again has right leg pains. Patient Care Team: Patient Care Team:  Giselle Terry MD as PCP - General (Internal Medicine)  Blaine Chong MD (Internal Medicine)  Jeramie Valentin, [address] (Dietitian)  Viry Brenner, PT  Kiah Jacobs PT as Physical Therapist    Patient Active Problem List times daily. Vitamin D3 2000 units Oral Cap, Take 2,000 Units by mouth daily. Multiple Vitamins-Minerals (CENTRUM SILVER) Oral Tab, Take 1 tablet by mouth daily. aspirin 81 MG Oral Chew Tab, Chew by mouth daily.   Ferrous Sulfate (IRON) 325 (65 Fe) MG no adenopathy, thyroid: not enlarged, symmetric, no tenderness/mass/nodules, no carotid bruit or JVD   Back:   Symmetric, no curvature, ROM normal, no CVA tenderness   Lungs:   Clear to auscultation bilaterally, respirations unlabored   Chest Wall:  No ten you lost more than 10 pounds without trying?: 2 - No  Has your appetite been poor?: No  How does the patient maintain a good energy level?: Appropriate Exercise  How would you describe your daily physical activity?: Moderate  How would you describe your cu birthday    Pneumococcal 23 (Pneumovax)  Covered Once after 65 No vaccine history found Please get once after your 65th birthday    Hepatitis B for Moderate/High Risk No vaccine history found Medium/high risk factors:   End-stage renal disease   Hemophilia

## 2020-07-02 ENCOUNTER — OFFICE VISIT (OUTPATIENT)
Dept: PHYSICAL THERAPY | Facility: HOSPITAL | Age: 80
End: 2020-07-02
Attending: INTERNAL MEDICINE
Payer: MEDICARE

## 2020-07-02 DIAGNOSIS — M79.604 RIGHT LEG PAIN: ICD-10-CM

## 2020-07-02 DIAGNOSIS — M54.16 LUMBAR RADICULOPATHY: ICD-10-CM

## 2020-07-02 PROCEDURE — 97110 THERAPEUTIC EXERCISES: CPT

## 2020-07-02 PROCEDURE — 97162 PT EVAL MOD COMPLEX 30 MIN: CPT

## 2020-07-02 NOTE — PROGRESS NOTES
SPINE EVALUATION:   Referring Physician: Dr. White Notice  Diagnosis: R leg pain , lumbar radiculopathy     Date of Service: 7/2/2020     PATIENT SUMMARY   Priyankaman Lenin is a 70year old male who presents to therapy today with complaints of R leg pain sidebend  Accessory motion: compression RL5/S1 Provoking pain. Palpation: hypersensitivity to light touch R lat calf    Strength:LE myotomes 5/5. Trunk rotational control weaker w/ regular hip substitution    Flexibility: Limitation piriformis B.  Mild HS options and has agreed to actively participate in planning and for this course of care. Thank you for your referral. Please co-sign or sign and return this letter via fax as soon as possible to [phone].  If you have any questions, please contact me

## 2020-07-06 ENCOUNTER — OFFICE VISIT (OUTPATIENT)
Dept: PHYSICAL THERAPY | Facility: HOSPITAL | Age: 80
End: 2020-07-06
Attending: INTERNAL MEDICINE
Payer: MEDICARE

## 2020-07-06 PROCEDURE — 97140 MANUAL THERAPY 1/> REGIONS: CPT

## 2020-07-06 PROCEDURE — 97110 THERAPEUTIC EXERCISES: CPT

## 2020-07-06 NOTE — PROGRESS NOTES
Dx: R leg pain, lumbar radiculopathy        Insurance (Authorized # of Visits):  6           Authorizing Physician: Dr. Yanci Grullon  Next MD visit: none scheduled  Fall Risk: standard         Precautions: n/a             Subjective: No relief or inc in sympto

## 2020-07-09 ENCOUNTER — OFFICE VISIT (OUTPATIENT)
Dept: PHYSICAL THERAPY | Facility: HOSPITAL | Age: 80
End: 2020-07-09
Attending: INTERNAL MEDICINE
Payer: MEDICARE

## 2020-07-09 PROCEDURE — 97110 THERAPEUTIC EXERCISES: CPT

## 2020-07-09 PROCEDURE — 97140 MANUAL THERAPY 1/> REGIONS: CPT

## 2020-07-09 NOTE — PROGRESS NOTES
Dx: R leg pain, lumbar radiculopathy        Insurance (Authorized # of Visits):  8           Authorizing Physician: Dr. Edgar ref.  provider found  Next MD visit: none scheduled  Fall Risk: standard         Precautions: n/a             Subjective: feeling bett

## 2020-07-13 ENCOUNTER — OFFICE VISIT (OUTPATIENT)
Dept: PHYSICAL THERAPY | Facility: HOSPITAL | Age: 80
End: 2020-07-13
Attending: INTERNAL MEDICINE
Payer: MEDICARE

## 2020-07-13 PROCEDURE — 97140 MANUAL THERAPY 1/> REGIONS: CPT

## 2020-07-13 PROCEDURE — 97110 THERAPEUTIC EXERCISES: CPT

## 2020-07-13 NOTE — PROGRESS NOTES
Dx: R leg pain, lumbar radiculopathy        Insurance (Authorized # of Visits):  6           Authorizing Physician: Dr. Kerry Smith MD visit: none scheduled  Fall Risk: standard         Precautions: n/a             Subjective:doing better.  Family notes hooklying x 10 x 2  X 8 min Ex:  Glut sets> short range bridge, LTR  Trunk rotation in sdly L/ R in pain free ROM  + HEP                   HEP: LTR, Glut sets  W/ short bridge, LTR  Charges: MT 2, ex 1  Total Timed Treatment: 38 min  Total Treatment Time:

## 2020-07-16 ENCOUNTER — OFFICE VISIT (OUTPATIENT)
Dept: PHYSICAL THERAPY | Facility: HOSPITAL | Age: 80
End: 2020-07-16
Attending: INTERNAL MEDICINE
Payer: MEDICARE

## 2020-07-16 PROCEDURE — 97110 THERAPEUTIC EXERCISES: CPT

## 2020-07-16 PROCEDURE — 97140 MANUAL THERAPY 1/> REGIONS: CPT

## 2020-07-16 NOTE — PROGRESS NOTES
Dx: R leg pain, lumbar radiculopathy        Insurance (Authorized # of Visits):  8           Authorizing Physician: Dr. Edgar ref. provider found  Next MD visit: none scheduled  Fall Risk: standard         Precautions: n/a             Subjective:doing better. rotation, repeated L sdly.    Hooklying, rectus femoris stretch L into functional Rom, Repeated R w/ Hold/relax and inc lumbar flexion stabilization    R hip flex, abd.add, IR / Er and triplanar motion in hooklying       LTR, Glut sets x 10> glut set w/ christina

## 2020-07-20 ENCOUNTER — OFFICE VISIT (OUTPATIENT)
Dept: PHYSICAL THERAPY | Facility: HOSPITAL | Age: 80
End: 2020-07-20
Attending: INTERNAL MEDICINE
Payer: MEDICARE

## 2020-07-20 PROCEDURE — 97140 MANUAL THERAPY 1/> REGIONS: CPT

## 2020-07-20 PROCEDURE — 97110 THERAPEUTIC EXERCISES: CPT

## 2020-07-20 NOTE — PROGRESS NOTES
Dx: R leg pain, lumbar radiculopathy        Insurance (Authorized # of Visits):  8           Authorizing Physician: Dr. Edgar ref.  provider found  Next MD visit: none scheduled  Fall Risk: standard         Precautions: n/a             Subjective:symptoms back stabilization    R hip flex, abd.add, IR / Er and triplanar motion in hooklying   MT. R sdly gr 1an2 trunk rotation, repeated L sdly.    Hooklying, rectus femoris stretch L into functional Rom, Repeated R w/ Hold/relax and inc lumbar flexion stabilization

## 2020-07-23 ENCOUNTER — PATIENT MESSAGE (OUTPATIENT)
Dept: INTERNAL MEDICINE CLINIC | Facility: CLINIC | Age: 80
End: 2020-07-23

## 2020-07-23 NOTE — TELEPHONE ENCOUNTER
Magalie Lopes, patients wife, states appointment with Dr. Radha Perez 8/11/2020 and PT appointments scheduled as well. Wife asking if TB can prescribe anything for pain to bridge gap until pain appointment 8/11/2020. TB please advise.

## 2020-07-23 NOTE — TELEPHONE ENCOUNTER
From: Felicia Cobb  To: Jacquelin Cabrera MD  Sent: 7/23/2020 6:50 AM CDT  Subject: Referral Request    This is Sal Khan, Pasha’s wife. He asked me to send a message and or call today.  He has been doing PT for a few weeks and doesn’t think he is

## 2020-07-23 NOTE — TELEPHONE ENCOUNTER
LOV-7/1/2020  Right leg pain related to lumbar radiculopathy- referred to PT, heat packs prn, no lifting over 10 pounds  -     OP REFERRAL TO [facility]      11/27/2019 OV  ASSESSMENT AND PLAN:   Right leg pain,  Lumbar radicular pain

## 2020-07-24 RX ORDER — TRAMADOL HYDROCHLORIDE 50 MG/1
50 TABLET ORAL EVERY 12 HOURS PRN
Qty: 60 TABLET | Refills: 1 | Status: SHIPPED | OUTPATIENT
Start: 2020-07-24 | End: 2021-05-13 | Stop reason: ALTCHOICE

## 2020-07-24 NOTE — TELEPHONE ENCOUNTER
Wife states regularly takes gabapentin. Never used tramadol. Has taken norco previously, regularly tests extra strength tylenol with the gabapentin with no improvement on pain. TB please advise.

## 2020-07-28 ENCOUNTER — PATIENT MESSAGE (OUTPATIENT)
Dept: PAIN CLINIC | Facility: CLINIC | Age: 80
End: 2020-07-28

## 2020-07-28 NOTE — TELEPHONE ENCOUNTER
From: Marilyn Godoy  To: Octavio Mcdonald MD  Sent: 7/28/2020 9:25 AM CDT  Subject: Other    My  Pasha is being referred to Dr. Stacey Bowen by Dr. Frannie Oliva for what she thinks is lumbar radiculopathy? He has an appointment scheduled on 8-11.  If you ha

## 2020-07-31 ENCOUNTER — OFFICE VISIT (OUTPATIENT)
Dept: PHYSICAL THERAPY | Age: 80
End: 2020-07-31
Attending: INTERNAL MEDICINE
Payer: MEDICARE

## 2020-07-31 PROCEDURE — 97110 THERAPEUTIC EXERCISES: CPT

## 2020-07-31 PROCEDURE — 97140 MANUAL THERAPY 1/> REGIONS: CPT

## 2020-07-31 NOTE — PROGRESS NOTES
Dx: R leg pain, lumbar radiculopathy         Insurance (Authorized # of Visits):  6           Authorizing Physician: Dr. Kaylah Duarte    Next MD visit: none scheduled  Fall Risk: standard           Precautions: n/a           Vabihav Andino; 70year old the last three weeks, although with significant activity modification. Symptom severity is variance is likely dependant on degree of walking and/or standing activity performed which is similar to his past episodes.   During today's session patient did jose

## 2020-08-03 ENCOUNTER — OFFICE VISIT (OUTPATIENT)
Dept: PAIN CLINIC | Facility: CLINIC | Age: 80
End: 2020-08-03
Payer: MEDICARE

## 2020-08-03 VITALS — HEIGHT: 67 IN | WEIGHT: 170 LBS | BODY MASS INDEX: 26.68 KG/M2

## 2020-08-03 DIAGNOSIS — M54.16 LUMBAR RADICULOPATHY: Primary | ICD-10-CM

## 2020-08-03 PROCEDURE — 99203 OFFICE O/P NEW LOW 30 MIN: CPT | Performed by: ANESTHESIOLOGY

## 2020-08-03 RX ORDER — DIAZEPAM 10 MG/1
10 TABLET ORAL NIGHTLY PRN
Qty: 3 TABLET | Refills: 0 | Status: ON HOLD | OUTPATIENT
Start: 2020-08-03 | End: 2021-05-05

## 2020-08-03 RX ORDER — AMOXICILLIN 500 MG/1
TABLET, FILM COATED ORAL
COMMUNITY
Start: 2020-07-08 | End: 2020-08-24 | Stop reason: ALTCHOICE

## 2020-08-03 NOTE — H&P
Name: Dm Ortiz   : 1940   DOS: 8/3/2020     Chief complaint: Lumbar radiculopathy    History of present illness:  Dm Ortiz is a 70year old male referred for evaluation right sided low back pain with radicular symptoms.   Tami Gil Tab TAKE 1 TABLET BY MOUTH EVERY DAY 90 tablet 1   • metFORMIN HCl  MG Oral Tablet 24 Hr TAKE 1 TABLET(750 MG) BY MOUTH TWICE DAILY 180 tablet 0   • Cyclobenzaprine HCl 5 MG Oral Tab Take 1 tablet (5 mg total) by mouth nightly as needed for Muscle sp positive. Neurologic:  Cranial nerves II through XII are grossly intact.        Examination of the lower back:     Motor Examination:   (R)   (L)   Hip Abduction:               5    5   Hip Flexion:    5    5   Knee Extension:   5    5   Knee Flexion:    5

## 2020-08-03 NOTE — PROGRESS NOTES
PHYSICAL EXAM:   Physical Exam   Constitutional:           Patient presents in office today with reported pain in right hip, right buttocks, right thigh, and right shin.     Current pain level reported = 3/10    Last reported dose of traMADol HCl 50 MG Or

## 2020-08-04 ENCOUNTER — OFFICE VISIT (OUTPATIENT)
Dept: PHYSICAL THERAPY | Age: 80
End: 2020-08-04
Attending: INTERNAL MEDICINE
Payer: MEDICARE

## 2020-08-04 PROCEDURE — 97530 THERAPEUTIC ACTIVITIES: CPT

## 2020-08-04 PROCEDURE — 97140 MANUAL THERAPY 1/> REGIONS: CPT

## 2020-08-04 PROCEDURE — 97110 THERAPEUTIC EXERCISES: CPT

## 2020-08-07 ENCOUNTER — OFFICE VISIT (OUTPATIENT)
Dept: PHYSICAL THERAPY | Age: 80
End: 2020-08-07
Attending: INTERNAL MEDICINE
Payer: MEDICARE

## 2020-08-07 PROCEDURE — 97110 THERAPEUTIC EXERCISES: CPT

## 2020-08-07 PROCEDURE — 97140 MANUAL THERAPY 1/> REGIONS: CPT

## 2020-08-07 NOTE — PROGRESS NOTES
Dx: R leg pain, lumbar radiculopathy         Insurance (Authorized # of Visits):  6           Authorizing Physician: Dr. Benita Smith MD visit: none scheduled  Fall Risk: standard           Precautions: n/a           Lisandro Crawford; 70year old seated flexion for symptom modulation following walking with demonstration to patient regarding symptom modulation:  15 second hold x 10 reps x 2 sets -Reviewed   -Trial of lumbar manual traction in S/L (increase in distal complaint -Reviewed single knee t

## 2020-08-10 ENCOUNTER — OFFICE VISIT (OUTPATIENT)
Dept: PHYSICAL THERAPY | Age: 80
End: 2020-08-10
Attending: INTERNAL MEDICINE
Payer: MEDICARE

## 2020-08-10 PROCEDURE — 97110 THERAPEUTIC EXERCISES: CPT

## 2020-08-10 PROCEDURE — 97140 MANUAL THERAPY 1/> REGIONS: CPT

## 2020-08-10 NOTE — PROGRESS NOTES
Dx: R leg pain, lumbar radiculopathy         Insurance (Authorized # of Visits):  6           Authorizing Physician: Dr. Mejia Valentin    Next MD visit: none scheduled  Fall Risk: standard           Precautions: n/a           Christofer Tejeda; 70year old without symptom aggravation - performed 12 reps progressed to home program -Reviwed -Long axis distraction in supine R hip Gr III  -S/L grade rotation  -S/L R UPA T12 - L5   -Long axis distraction;  -Long axis distraction right lower extremity -Long axis d

## 2020-08-14 ENCOUNTER — OFFICE VISIT (OUTPATIENT)
Dept: PHYSICAL THERAPY | Age: 80
End: 2020-08-14
Attending: INTERNAL MEDICINE
Payer: MEDICARE

## 2020-08-14 PROCEDURE — 97110 THERAPEUTIC EXERCISES: CPT

## 2020-08-14 PROCEDURE — 97140 MANUAL THERAPY 1/> REGIONS: CPT

## 2020-08-14 NOTE — PROGRESS NOTES
Progress  Summary    Pt has attended 12 visits in Physical Therapy.   Dx: R leg pain, lumbar radiculopathy         Insurance (Authorized # of Visits):  6           Authorizing Physician: Dr. Mejia Valentin    Next MD visit: none scheduled  Fall Risk: standard -Reviewed -Reviewed repeated in flexion, supine knee to chest stretch, supine single knee to chest based patient question and to assess performance.  -Reviewed repeated in flexion, supine knee to chest stretch, supine single knee to chest based patient ques

## 2020-08-18 ENCOUNTER — HOSPITAL ENCOUNTER (OUTPATIENT)
Dept: MRI IMAGING | Age: 80
Discharge: HOME | End: 2020-08-18
Attending: ANESTHESIOLOGY
Payer: MEDICARE

## 2020-08-18 DIAGNOSIS — M54.16 LUMBAR RADICULOPATHY: ICD-10-CM

## 2020-08-18 PROCEDURE — 72148 MRI LUMBAR SPINE W/O DYE: CPT | Performed by: ANESTHESIOLOGY

## 2020-08-20 DIAGNOSIS — I10 ESSENTIAL HYPERTENSION: ICD-10-CM

## 2020-08-21 RX ORDER — LISINOPRIL 40 MG/1
TABLET ORAL
Qty: 90 TABLET | Refills: 1 | Status: SHIPPED | OUTPATIENT
Start: 2020-08-21 | End: 2021-02-17

## 2020-08-24 ENCOUNTER — OFFICE VISIT (OUTPATIENT)
Dept: SURGERY | Facility: CLINIC | Age: 80
End: 2020-08-24
Payer: MEDICARE

## 2020-08-24 VITALS
BODY MASS INDEX: 26.68 KG/M2 | SYSTOLIC BLOOD PRESSURE: 144 MMHG | DIASTOLIC BLOOD PRESSURE: 70 MMHG | RESPIRATION RATE: 16 BRPM | HEART RATE: 72 BPM | WEIGHT: 170 LBS | HEIGHT: 67 IN

## 2020-08-24 DIAGNOSIS — N40.1 BENIGN PROSTATIC HYPERPLASIA WITH LOWER URINARY TRACT SYMPTOMS, SYMPTOM DETAILS UNSPECIFIED: Primary | ICD-10-CM

## 2020-08-24 PROCEDURE — G0463 HOSPITAL OUTPT CLINIC VISIT: HCPCS | Performed by: UROLOGY

## 2020-08-24 PROCEDURE — 99213 OFFICE O/P EST LOW 20 MIN: CPT | Performed by: UROLOGY

## 2020-08-24 NOTE — PROGRESS NOTES
Layjocelynn Phelps is a 70year old male. HPI:   Patient presents with:  BPH: pt here today for 8 month f/u for the TX of BPH, denies any urinary issues and performe CIC twice daily.       42-year-old male with a history of BPH chronically status post diazepam 10 MG Oral Tab Take 1 tablet (10 mg total) by mouth nightly as needed for Anxiety (Take one the evening prior to MRI. Can repeat in AM of MRI and 1 hour prior to study if needed. Will need transportation).  3 tablet 0   • OMEPRAZOLE 40 MG Oral Caps Known Allergies      ROS:       PHYSICAL EXAM:        ASSESSMENT/PLAN:   Assessment   Benign prostatic hyperplasia with lower urinary tract symptoms, symptom details unspecified  (primary encounter diagnosis)    Recommended:  –Continue on finasteride.   –PS room air

## 2020-08-25 RX ORDER — SIMVASTATIN 40 MG
TABLET ORAL
Qty: 90 TABLET | Refills: 1 | Status: SHIPPED | OUTPATIENT
Start: 2020-08-25 | End: 2021-02-17

## 2020-08-25 RX ORDER — GABAPENTIN 300 MG/1
CAPSULE ORAL
Qty: 270 CAPSULE | Refills: 1 | Status: SHIPPED | OUTPATIENT
Start: 2020-08-25 | End: 2021-02-17

## 2020-08-25 NOTE — TELEPHONE ENCOUNTER
Last Ov: 7/1/20, TB, CPE  Last labs: Lipid, TSH w Ref, CBC, CMP, A1c 3/5/20  Last Rx: gabapentin 300mg, #270, 0R 5/29/20    Future Appointments   Date Time Provider Sadia Webb   8/28/2020  8:15 AM Gio Alas MD [facility] [place]   2/23/2021  7:1

## 2020-08-28 ENCOUNTER — OFFICE VISIT (OUTPATIENT)
Dept: PAIN CLINIC | Facility: CLINIC | Age: 80
End: 2020-08-28
Payer: MEDICARE

## 2020-08-28 VITALS — WEIGHT: 170 LBS | BODY MASS INDEX: 26.68 KG/M2 | HEIGHT: 67 IN

## 2020-08-28 DIAGNOSIS — M54.16 LUMBAR RADICULOPATHY: Primary | ICD-10-CM

## 2020-08-28 PROCEDURE — 99213 OFFICE O/P EST LOW 20 MIN: CPT | Performed by: ANESTHESIOLOGY

## 2020-08-28 NOTE — PROGRESS NOTES
Name: Miri Madison   : 1940   DOS: 2020     Pain Clinic Follow Up Visit:   Patient presents with:  [facility] F/U      Miri Madison is a 70year old male with a history of right-sided low back pain after lifting a bag of Oral Tab Take 1 tablet (5 mg total) by mouth nightly as needed for Muscle spasms. 30 tablet 0   • acetaminophen 500 MG Oral Tab Take 1,000 mg by mouth 3 (three) times daily. • Vitamin D3 2000 units Oral Cap Take 2,000 Units by mouth daily.      • Liza 8/28/2020, 9:03 AM

## 2020-08-28 NOTE — PROGRESS NOTES
Patient presents in office today with reported pain in patient states no pain     Current pain level reported = 1/10    Last reported dose of traMADol HCl 50 MG Oral Tab patient states a few weeks prior to OV

## 2020-09-14 RX ORDER — AMLODIPINE BESYLATE 10 MG/1
TABLET ORAL
Qty: 90 TABLET | Refills: 0 | Status: SHIPPED | OUTPATIENT
Start: 2020-09-14 | End: 2020-12-11

## 2020-09-14 NOTE — TELEPHONE ENCOUNTER
PASSED per protocol, refill sent. Last PE 7. 1.20   Future Appointments   Date Time Provider Sadia Tracey   2/23/2021  7:10 AM Ina Bartholomew MD [facility]   8/26/2021  8:30 AM Anila Issa MD [facility]

## 2020-09-22 RX ORDER — HYDROCHLOROTHIAZIDE 25 MG/1
TABLET ORAL
Qty: 90 TABLET | Refills: 1 | Status: SHIPPED | OUTPATIENT
Start: 2020-09-22 | End: 2021-03-17

## 2020-09-23 NOTE — TELEPHONE ENCOUNTER
Notes recorded by Esther Gillis RN on 3/9/2020 at 9:47 AM CDT  Sent results w/ recommendations to [facility]  ------    Notes recorded by Francois Jerez MD on 3/7/2020 at 6:30 PM CST  Chronic anemia - improved from a year ago  hgba1c is normal  Labs stabl
Pt had labs done the beginning of March for his wellness on 3/26/20 but he prefers to reschedule to     Future Appointments   Date Time Provider Sadia Webb   5/7/2020  9:00 AM Ayanna Barger MD [facility]     Results were being held for o
fever

## 2020-09-30 ENCOUNTER — PATIENT MESSAGE (OUTPATIENT)
Dept: INTERNAL MEDICINE CLINIC | Facility: CLINIC | Age: 80
End: 2020-09-30

## 2020-09-30 RX ORDER — METFORMIN HYDROCHLORIDE 750 MG/1
TABLET, EXTENDED RELEASE ORAL
Qty: 180 TABLET | Refills: 0 | Status: SHIPPED | OUTPATIENT
Start: 2020-09-30 | End: 2020-12-28

## 2020-09-30 RX ORDER — METFORMIN HYDROCHLORIDE 750 MG/1
TABLET, EXTENDED RELEASE ORAL
Qty: 180 TABLET | Refills: 0 | OUTPATIENT
Start: 2020-09-30

## 2020-09-30 NOTE — TELEPHONE ENCOUNTER
From: Felicia Cobb  To: Jacquelin Cabrera MD  Sent: 9/30/2020 8:26 AM CDT  Subject: Prescription Question    Pasha is running low on the Metformin medicine. Is there something he needs to do to get a refill. He is no longer going to weight loss clinic.

## 2020-09-30 NOTE — TELEPHONE ENCOUNTER
Requesting Metformin  mg  LOV: 5/30/19  RTC: one month  Last Relevant Labs: 3/5/20  Filled: 11/25/19 #180 with 0 refills    Future Appointments   Date Time Provider Sadia Webb   2/23/2021  7:10 AM Amy Oro MD [facility]   8/26

## 2020-09-30 NOTE — TELEPHONE ENCOUNTER
Last Ov: 7/1/20, TB, CPE  Last labs: A1c, CMP, CBC, TSH w Ref, Lipid 3/5/20  Last Rx: metformin ER 750mg, #180, 0R 11/25/19 (Last filled by Dr. Ion Cano)    Future Appointments   Date Time Provider Sadia Webb   2/23/2021  7:10 AM Benedicto Silverio MD [facility]

## 2020-10-11 VITALS
HEIGHT: 67 IN | BODY MASS INDEX: 30.92 KG/M2 | DIASTOLIC BLOOD PRESSURE: 80 MMHG | SYSTOLIC BLOOD PRESSURE: 134 MMHG | WEIGHT: 197 LBS

## 2020-10-11 VITALS
SYSTOLIC BLOOD PRESSURE: 130 MMHG | HEIGHT: 67 IN | BODY MASS INDEX: 32.02 KG/M2 | DIASTOLIC BLOOD PRESSURE: 74 MMHG | WEIGHT: 203.99 LBS

## 2020-11-25 ENCOUNTER — TELEPHONE (OUTPATIENT)
Dept: SURGERY | Facility: CLINIC | Age: 80
End: 2020-11-25

## 2020-11-25 NOTE — TELEPHONE ENCOUNTER
Received fax from [address] [place] [address] for detailed order for cath supplies. Order placed on providers desk.

## 2020-11-27 ENCOUNTER — TELEPHONE (OUTPATIENT)
Dept: SURGERY | Facility: CLINIC | Age: 80
End: 2020-11-27

## 2020-11-27 NOTE — TELEPHONE ENCOUNTER
Pt called and stated that it has become harder to cath himself for the last month. He was told to straight cath himself BID. As of Tuesday he has only been able to straight cath himself once daily before bed. Pt is still voiding throughout the day, and he is getting a large amount of urine out when he straight caths at night. Pt denies pain, fever, chills, burning with urination, hematuria, vomiting, and diarrhea. Per pt he is using a 16fr. Coude cath.     Per pt please call him back on Friday 11/27/2020 at [phone] or on Monday 11/30/2020 in the morning at the office [phone] please advise, thanks

## 2020-11-27 NOTE — TELEPHONE ENCOUNTER
Pt called stating pt is having a problem self catheterizing. Pt is requesting to speak to a nurse. Call transferred to the nurse.

## 2020-11-27 NOTE — TELEPHONE ENCOUNTER
Pt called and discussed with him that if he becomes unable to urinate or straight cath himself he will need to go to the ER. Pt verbalized his understanding and all questions were answered.

## 2020-11-27 NOTE — TELEPHONE ENCOUNTER
Left message to inform the pt that if he becomes unable to urinate or straight cath himself he will need to go to the ER.

## 2020-11-30 NOTE — TELEPHONE ENCOUNTER
Wife stated she would have the pt call us back when he came home. If he is still having problems self catheterizing rec pt see Dr. Saida Hill on 12/1/2020 at 1300.

## 2020-11-30 NOTE — TELEPHONE ENCOUNTER
Is he having difficulty at night as well? Anything he is doing differently between his morning catheterization compared to his night catheterization? If he is having ongoing difficulty I would recommend having patient follow up with [facility] this week if possible.

## 2020-11-30 NOTE — TELEPHONE ENCOUNTER
Pt called back. Stated still only able to cath once a day instead of twice. When he caths twice a day he is unable to push the cath through. Pt stated he has a lot of resistance when inserting the cath. Previously he had very little resistance. Pt denies pain, fever, chills, burning with urination, hematuria, vomiting, and diarrhea.     Per [facility] I scheduled the pt to follow up with Dr. Venessa Gunderson on 12/1/2020 at 1:00pm.

## 2020-12-01 ENCOUNTER — OFFICE VISIT (OUTPATIENT)
Dept: SURGERY | Facility: CLINIC | Age: 80
End: 2020-12-01
Payer: MEDICARE

## 2020-12-01 VITALS
HEIGHT: 67 IN | SYSTOLIC BLOOD PRESSURE: 142 MMHG | WEIGHT: 170 LBS | DIASTOLIC BLOOD PRESSURE: 74 MMHG | HEART RATE: 94 BPM | BODY MASS INDEX: 26.68 KG/M2

## 2020-12-01 DIAGNOSIS — N40.1 BENIGN PROSTATIC HYPERPLASIA WITH LOWER URINARY TRACT SYMPTOMS, SYMPTOM DETAILS UNSPECIFIED: Primary | ICD-10-CM

## 2020-12-01 DIAGNOSIS — R31.0 HEMATURIA, GROSS: ICD-10-CM

## 2020-12-01 PROCEDURE — 99214 OFFICE O/P EST MOD 30 MIN: CPT | Performed by: UROLOGY

## 2020-12-01 PROCEDURE — G0463 HOSPITAL OUTPT CLINIC VISIT: HCPCS | Performed by: UROLOGY

## 2020-12-01 NOTE — PROGRESS NOTES
Vaibhav Andino is a 68year old male.     HPI:   Patient presents with:  Urinary Symptoms: pt states he does self cath doing this for about 2 years now, pt is having issues he can self cath 1 time a day more then that he is having issues       78-yea less    Drug use: No       Medications (Active prior to today's visit):  Current Outpatient Medications   Medication Sig Dispense Refill   • metFORMIN HCl  MG Oral Tablet 24 Hr TAKE 1 TABLET(750 MG) BY MOUTH TWICE DAILY 180 tablet 0   • HYDROCHLOROTH mouth.     • Psyllium (METAMUCIL OR) Take by mouth daily.            Allergies:  No Known Allergies      ROS:       PHYSICAL EXAM:        ASSESSMENT/PLAN:   Assessment   Benign prostatic hyperplasia with lower urinary tract symptoms, symptom details unspeci

## 2020-12-04 NOTE — TELEPHONE ENCOUNTER
Order signed by AMY Henley. Faxed to [address] [place] [address], confirmation received. Copy sent to scanning.

## 2020-12-11 RX ORDER — AMLODIPINE BESYLATE 10 MG/1
TABLET ORAL
Qty: 90 TABLET | Refills: 0 | Status: SHIPPED | OUTPATIENT
Start: 2020-12-11 | End: 2021-03-15

## 2020-12-11 NOTE — TELEPHONE ENCOUNTER
LOV:7/1/20, CPE, TB  Last CPE:7/1/20, CPE, TB  Last refill:AMLODIPINE BESYLATE 10 MG Oral Tab,9/14/20  Quantity:90 tablet, 0 refills  Last labs that are related: cbc, tsh, cmp 3/5/20  Future appointment:  Future Appointments   Date Time Provider Department

## 2020-12-17 RX ORDER — FINASTERIDE 5 MG/1
5 TABLET, FILM COATED ORAL DAILY
Qty: 90 TABLET | Refills: 3 | Status: SHIPPED | OUTPATIENT
Start: 2020-12-17 | End: 2021-12-13

## 2020-12-17 NOTE — TELEPHONE ENCOUNTER
Ethan/pharm calling for mutual patient for rx:Finasteride to request script refill. Please update at:[phone],thanks. Current Outpatient Medications:   •  finasteride 5 MG Oral Tab, Take 1 tablet (5 mg total) by mouth daily. , Disp: 90 tablet, Rf

## 2020-12-28 RX ORDER — METFORMIN HYDROCHLORIDE 750 MG/1
TABLET, EXTENDED RELEASE ORAL
Qty: 180 TABLET | Refills: 0 | Status: SHIPPED | OUTPATIENT
Start: 2020-12-28 | End: 2021-05-05 | Stop reason: CLARIF

## 2020-12-28 NOTE — TELEPHONE ENCOUNTER
Last Ov: 7/1/20, TB, CPE  Last labs: A1c, BMP, CBC, TSH w Ref, Lipid 3/5/20  Last Rx: metformin ER 750mg, #180, 0R 9/30/20    Future Appointments   Date Time Provider Sadia Webb   1/26/2021  9:00 AM Aron Eng MD [facility]   2/23/2021  7:1

## 2021-01-06 ENCOUNTER — TELEPHONE (OUTPATIENT)
Dept: INTERNAL MEDICINE CLINIC | Facility: CLINIC | Age: 81
End: 2021-01-06

## 2021-01-06 DIAGNOSIS — E78.00 PURE HYPERCHOLESTEROLEMIA: ICD-10-CM

## 2021-01-06 DIAGNOSIS — R73.9 BLOOD GLUCOSE ELEVATED: ICD-10-CM

## 2021-01-06 DIAGNOSIS — I10 ESSENTIAL HYPERTENSION: ICD-10-CM

## 2021-01-06 DIAGNOSIS — Z00.00 ROUTINE GENERAL MEDICAL EXAMINATION AT A HEALTH CARE FACILITY: Primary | ICD-10-CM

## 2021-01-06 NOTE — TELEPHONE ENCOUNTER
Last labs done 3/5/20  Pending fasting labs per protocol to Renetta Lira, please sign if appropriate

## 2021-01-06 NOTE — TELEPHONE ENCOUNTER
Future Appointments   Date Time Provider Sadia Tracey   1/25/2021  3:00 PM Moris Childs MD [facility]

## 2021-01-06 NOTE — TELEPHONE ENCOUNTER
Orders to THE [facility]. Pt aware to get labs done no sooner than 2 weeks prior to the appt. Pt aware to fast.  No call back required.     Future Appointments   Date Time Provider Sadia Webb   1/25/2021  3:00 PM Burt Jones MD [facility]

## 2021-01-25 ENCOUNTER — OFFICE VISIT (OUTPATIENT)
Dept: INTERNAL MEDICINE CLINIC | Facility: CLINIC | Age: 81
End: 2021-01-25
Payer: MEDICARE

## 2021-01-25 VITALS
HEART RATE: 74 BPM | SYSTOLIC BLOOD PRESSURE: 132 MMHG | BODY MASS INDEX: 27.44 KG/M2 | WEIGHT: 174.81 LBS | DIASTOLIC BLOOD PRESSURE: 64 MMHG | HEIGHT: 67 IN | OXYGEN SATURATION: 98 % | TEMPERATURE: 98 F

## 2021-01-25 DIAGNOSIS — E78.00 PURE HYPERCHOLESTEROLEMIA: ICD-10-CM

## 2021-01-25 DIAGNOSIS — R73.01 IFG (IMPAIRED FASTING GLUCOSE): ICD-10-CM

## 2021-01-25 DIAGNOSIS — I10 ESSENTIAL HYPERTENSION: ICD-10-CM

## 2021-01-25 DIAGNOSIS — M54.16 LUMBAR RADICULOPATHY: ICD-10-CM

## 2021-01-25 DIAGNOSIS — Z00.00 ENCOUNTER FOR ANNUAL HEALTH EXAMINATION: Primary | ICD-10-CM

## 2021-01-25 PROCEDURE — 99214 OFFICE O/P EST MOD 30 MIN: CPT | Performed by: INTERNAL MEDICINE

## 2021-01-25 NOTE — PROGRESS NOTES
HPI:   Yolanda Nova is a 68year old male who presents for a Medicare Subsequent Annual Wellness visit (Pt already had Initial Annual Wellness). Patient here for wellness.     HTN and HL- compliant with medications, no cardiac complaints, defe Wilfrido Gallegos MD (Internal Medicine)  Inderjit Ty RD (Dietitian)  Lavelle Wilder, PT  Laurence Kumar, PT as Physical Therapist  Ese Meza PT as Physical Therapist    Patient Active Problem List:     Gastroesophageal reflux disease without esophagitis prior to study if needed. Will need transportation). •  traMADol HCl 50 MG Oral Tab, Take 1 tablet (50 mg total) by mouth every 12 (twelve) hours as needed for Pain.     •  OMEPRAZOLE 40 MG Oral Capsule Delayed Release, TAKE ONE CAPSULE BY MOUTH TWICE DA encounter: 5' 7\" (1.702 m). Weight as of this encounter: 174 lb 12.8 oz (79.3 kg).     Medicare Hearing Assessment  (Required for AWV/SWV)    {Hearing finger rub wnl with hearing aids in           Visual Acuity grossly wnl                           Gene continue metformin, check hgba1c    GERD- controlled, CPM    BPH - follows with urology, he has appointment tomorrow    Lumbar radiculopathy, arthritis and neuropathy- symptoms well controlled on gabapentin         Diet assessment: fair     PLAN:  The yadiel Glaucoma Screening      Ophthalmology Visit Annually: Diabetics, FHx Glaucoma, AA>50, > 65 No flowsheet data found. Prostate Cancer Screening      PSA  Annually There are no preventive care reminders to display for this patient.   Update Health

## 2021-01-26 ENCOUNTER — PROCEDURE (OUTPATIENT)
Dept: SURGERY | Facility: CLINIC | Age: 81
End: 2021-01-26
Payer: MEDICARE

## 2021-01-26 VITALS
RESPIRATION RATE: 16 BRPM | DIASTOLIC BLOOD PRESSURE: 78 MMHG | HEART RATE: 80 BPM | HEIGHT: 67 IN | WEIGHT: 174 LBS | SYSTOLIC BLOOD PRESSURE: 130 MMHG | BODY MASS INDEX: 27.31 KG/M2

## 2021-01-26 DIAGNOSIS — N35.919 STRICTURE OF MALE URETHRA, UNSPECIFIED STRICTURE TYPE: ICD-10-CM

## 2021-01-26 DIAGNOSIS — N40.1 BENIGN PROSTATIC HYPERPLASIA WITH LOWER URINARY TRACT SYMPTOMS, SYMPTOM DETAILS UNSPECIFIED: Primary | ICD-10-CM

## 2021-01-26 PROCEDURE — 99213 OFFICE O/P EST LOW 20 MIN: CPT | Performed by: UROLOGY

## 2021-01-26 PROCEDURE — 52000 CYSTOURETHROSCOPY: CPT | Performed by: UROLOGY

## 2021-01-26 RX ORDER — CIPROFLOXACIN 500 MG/1
500 TABLET, FILM COATED ORAL ONCE
Status: COMPLETED | OUTPATIENT
Start: 2021-01-26 | End: 2021-01-26

## 2021-01-26 RX ADMIN — CIPROFLOXACIN 500 MG: 500 TABLET, FILM COATED ORAL at 09:57:00

## 2021-01-26 NOTE — PROGRESS NOTES
Reginaldo Carlisle is a 68year old male. HPI:   Patient presents with:  Urethral Stricture: cystoscopy, pt continues to self-cath 1 time day      53-year-old male in follow-up to a previous visit December 1, 2020.   Has a chronic history of BPH statu Oral Tab Take 1 tablet (5 mg total) by mouth daily.  90 tablet 3   • AMLODIPINE BESYLATE 10 MG Oral Tab TAKE 1 TABLET BY MOUTH EVERY DAY 90 tablet 0   • HYDROCHLOROTHIAZIDE 25 MG Oral Tab TAKE 1 TABLET(25 MG) BY MOUTH EVERY DAY 90 tablet 1   • GABAPENTIN 30 cystoscopy, pt continues to self-cath 1 time day          CYSTOSCOPY    Anesthesia:  2% lidocaine gel    Urethra: Normal  Prostate / Pelvic: Abnormal Evidence of prior TUR of the prostate.   High riding bladder neck with a bit of a crevice or indentation th

## 2021-01-28 ENCOUNTER — LAB ENCOUNTER (OUTPATIENT)
Dept: LAB | Age: 81
End: 2021-01-28
Attending: INTERNAL MEDICINE
Payer: MEDICARE

## 2021-01-28 DIAGNOSIS — R73.9 BLOOD GLUCOSE ELEVATED: ICD-10-CM

## 2021-01-28 DIAGNOSIS — E78.00 PURE HYPERCHOLESTEROLEMIA: ICD-10-CM

## 2021-01-28 DIAGNOSIS — I10 ESSENTIAL HYPERTENSION: ICD-10-CM

## 2021-01-28 DIAGNOSIS — N40.1 BENIGN PROSTATIC HYPERPLASIA WITH LOWER URINARY TRACT SYMPTOMS, SYMPTOM DETAILS UNSPECIFIED: ICD-10-CM

## 2021-01-28 LAB
ALBUMIN SERPL-MCNC: 3.8 G/DL (ref 3.4–5)
ALBUMIN/GLOB SERPL: 1 {RATIO} (ref 1–2)
ALP LIVER SERPL-CCNC: 61 U/L
ALT SERPL-CCNC: 29 U/L
ANION GAP SERPL CALC-SCNC: 3 MMOL/L (ref 0–18)
AST SERPL-CCNC: 21 U/L (ref 15–37)
BASOPHILS # BLD AUTO: 0.06 X10(3) UL (ref 0–0.2)
BASOPHILS NFR BLD AUTO: 0.8 %
BILIRUB SERPL-MCNC: 0.5 MG/DL (ref 0.1–2)
BUN BLD-MCNC: 36 MG/DL (ref 7–18)
BUN/CREAT SERPL: 29.3 (ref 10–20)
CALCIUM BLD-MCNC: 9.7 MG/DL (ref 8.5–10.1)
CHLORIDE SERPL-SCNC: 109 MMOL/L (ref 98–112)
CHOLEST SMN-MCNC: 127 MG/DL (ref ?–200)
CO2 SERPL-SCNC: 28 MMOL/L (ref 21–32)
CREAT BLD-MCNC: 1.23 MG/DL
DEPRECATED RDW RBC AUTO: 37.5 FL (ref 35.1–46.3)
EOSINOPHIL # BLD AUTO: 0.48 X10(3) UL (ref 0–0.7)
EOSINOPHIL NFR BLD AUTO: 6.4 %
ERYTHROCYTE [DISTWIDTH] IN BLOOD BY AUTOMATED COUNT: 15.9 % (ref 11–15)
EST. AVERAGE GLUCOSE BLD GHB EST-MCNC: 114 MG/DL (ref 68–126)
GLOBULIN PLAS-MCNC: 3.8 G/DL (ref 2.8–4.4)
GLUCOSE BLD-MCNC: 103 MG/DL (ref 70–99)
HBA1C MFR BLD HPLC: 5.6 % (ref ?–5.7)
HCT VFR BLD AUTO: 36.6 %
HDLC SERPL-MCNC: 29 MG/DL (ref 40–59)
HGB BLD-MCNC: 11 G/DL
IMM GRANULOCYTES # BLD AUTO: 0.02 X10(3) UL (ref 0–1)
IMM GRANULOCYTES NFR BLD: 0.3 %
LDLC SERPL CALC-MCNC: 60 MG/DL (ref ?–100)
LYMPHOCYTES # BLD AUTO: 1.86 X10(3) UL (ref 1–4)
LYMPHOCYTES NFR BLD AUTO: 25 %
M PROTEIN MFR SERPL ELPH: 7.6 G/DL (ref 6.4–8.2)
MCH RBC QN AUTO: 20.4 PG (ref 26–34)
MCHC RBC AUTO-ENTMCNC: 30.1 G/DL (ref 31–37)
MCV RBC AUTO: 67.9 FL
MONOCYTES # BLD AUTO: 1.04 X10(3) UL (ref 0.1–1)
MONOCYTES NFR BLD AUTO: 14 %
NEUTROPHILS # BLD AUTO: 3.99 X10 (3) UL (ref 1.5–7.7)
NEUTROPHILS # BLD AUTO: 3.99 X10(3) UL (ref 1.5–7.7)
NEUTROPHILS NFR BLD AUTO: 53.5 %
NONHDLC SERPL-MCNC: 98 MG/DL (ref ?–130)
OSMOLALITY SERPL CALC.SUM OF ELEC: 299 MOSM/KG (ref 275–295)
PATIENT FASTING Y/N/NP: YES
PATIENT FASTING Y/N/NP: YES
PLATELET # BLD AUTO: 221 10(3)UL (ref 150–450)
POTASSIUM SERPL-SCNC: 4.4 MMOL/L (ref 3.5–5.1)
PSA SERPL-MCNC: 0.7 NG/ML (ref ?–4)
RBC # BLD AUTO: 5.39 X10(6)UL
SODIUM SERPL-SCNC: 140 MMOL/L (ref 136–145)
TRIGL SERPL-MCNC: 192 MG/DL (ref 30–149)
TSI SER-ACNC: 1 MIU/ML (ref 0.36–3.74)
VLDLC SERPL CALC-MCNC: 38 MG/DL (ref 0–30)
WBC # BLD AUTO: 7.5 X10(3) UL (ref 4–11)

## 2021-01-28 PROCEDURE — 80061 LIPID PANEL: CPT

## 2021-01-28 PROCEDURE — 84153 ASSAY OF PSA TOTAL: CPT

## 2021-01-28 PROCEDURE — 83036 HEMOGLOBIN GLYCOSYLATED A1C: CPT

## 2021-01-28 PROCEDURE — 85025 COMPLETE CBC W/AUTO DIFF WBC: CPT

## 2021-01-28 PROCEDURE — 84443 ASSAY THYROID STIM HORMONE: CPT

## 2021-01-28 PROCEDURE — 36415 COLL VENOUS BLD VENIPUNCTURE: CPT

## 2021-01-28 PROCEDURE — 80053 COMPREHEN METABOLIC PANEL: CPT

## 2021-02-16 DIAGNOSIS — I10 ESSENTIAL HYPERTENSION: ICD-10-CM

## 2021-02-17 RX ORDER — SIMVASTATIN 40 MG
TABLET ORAL
Qty: 90 TABLET | Refills: 1 | Status: SHIPPED | OUTPATIENT
Start: 2021-02-17 | End: 2021-05-05 | Stop reason: CLARIF

## 2021-02-17 RX ORDER — LISINOPRIL 40 MG/1
TABLET ORAL
Qty: 90 TABLET | Refills: 1 | Status: SHIPPED | OUTPATIENT
Start: 2021-02-17 | End: 2021-05-05 | Stop reason: CLARIF

## 2021-02-17 RX ORDER — GABAPENTIN 300 MG/1
CAPSULE ORAL
Qty: 270 CAPSULE | Refills: 1 | Status: SHIPPED | OUTPATIENT
Start: 2021-02-17 | End: 2021-05-05 | Stop reason: CLARIF

## 2021-03-09 ENCOUNTER — TELEPHONE (OUTPATIENT)
Dept: SURGERY | Facility: CLINIC | Age: 81
End: 2021-03-09

## 2021-03-09 NOTE — TELEPHONE ENCOUNTER
Patient calling to speak with nurse to provide update on his catheter care, please call at:[phone],thanks.

## 2021-03-09 NOTE — TELEPHONE ENCOUNTER
Called patient to number provided, number is his work number and he was done for the day, was told he would not be back until tomorrow morning    Tried to call patient on his cell, line rang for a few moments then went to busy tone.  Unable to leave a messa

## 2021-03-12 DIAGNOSIS — Z23 NEED FOR VACCINATION: ICD-10-CM

## 2021-03-15 RX ORDER — AMLODIPINE BESYLATE 10 MG/1
TABLET ORAL
Qty: 90 TABLET | Refills: 0 | Status: SHIPPED | OUTPATIENT
Start: 2021-03-15 | End: 2021-05-05 | Stop reason: CLARIF

## 2021-03-15 NOTE — TELEPHONE ENCOUNTER
Last visit- 01/25/2021    Last refill- 12/11/2020 amlodipine besylate 10mg QTY90 0R    Last labs-  01/28/2021 psa, cbc, cmp, lipid, tsh, hemoglobin a1c  Future Appointments   Date Time Provider Sadia Webb   8/26/2021  8:30 AM Ramiro Uribe MD [facility]

## 2021-03-16 NOTE — TELEPHONE ENCOUNTER
Tried to reach pt again on the wk # and was told he'd be out till; 3/27 and I then tried the cell # and it rings twice and then reverts to a fast busy signal.

## 2021-03-17 RX ORDER — HYDROCHLOROTHIAZIDE 25 MG/1
TABLET ORAL
Qty: 90 TABLET | Refills: 1 | Status: SHIPPED | OUTPATIENT
Start: 2021-03-17 | End: 2021-05-05 | Stop reason: CLARIF

## 2021-03-22 NOTE — TELEPHONE ENCOUNTER
S/W pt and determined that he is performing CIC twice daily and it is going well and he wrote down his PVR's and the amts range from 14 oz (420 ml) and 21 oz (630 ml).  Pt states he always urinates naturally before performing CIC and states that when he uri

## 2021-03-23 NOTE — TELEPHONE ENCOUNTER
630 mL is quite high for residual.  Having that much urine continuously in the bladder predisposes him to urinary tract infections. I would recommend increasing the frequency of catheterizations from 2 times per day to 3 times per day.   If he needs additi

## 2021-03-24 NOTE — TELEPHONE ENCOUNTER
S/W pt and informed him of SIRIB's msg response as stated below and pt was not happy about 3 times daily but he agreed to do it and let use know what the amts are like.

## 2021-03-29 ENCOUNTER — PATIENT MESSAGE (OUTPATIENT)
Dept: INTERNAL MEDICINE CLINIC | Facility: CLINIC | Age: 81
End: 2021-03-29

## 2021-03-29 NOTE — TELEPHONE ENCOUNTER
From: Alberto Matthews  To: Arben Gann MD  Sent: 3/29/2021 10:55 AM CDT  Subject: Non-Urgent Medical Question    I have received a first Covid vaccine from another source so I don’t need one from [facility].

## 2021-04-22 ENCOUNTER — TELEPHONE (OUTPATIENT)
Dept: SURGERY | Facility: CLINIC | Age: 81
End: 2021-04-22

## 2021-04-22 ENCOUNTER — NURSE ONLY (OUTPATIENT)
Dept: SURGERY | Facility: CLINIC | Age: 81
End: 2021-04-22
Payer: MEDICARE

## 2021-04-22 DIAGNOSIS — R33.9 URINARY RETENTION: Primary | ICD-10-CM

## 2021-04-22 PROCEDURE — 51798 US URINE CAPACITY MEASURE: CPT | Performed by: UROLOGY

## 2021-04-22 NOTE — PROGRESS NOTES
I called the pt into the exam room and I introduced myself and verified pt's name and  and I asked pt if he could please empty his bladder before I perform a bladder scan to see if he is retaining urine.  Pt stated that he just urinated in the [facility] collected my supplies and went back to the room. I again explained how to use this particular cath and pt was able to advance this cath better but was still having some difficulty.  He was finally able to reach the bladder and had a return of 500 ml of clou

## 2021-04-22 NOTE — TELEPHONE ENCOUNTER
Pt called stating pt advised to self catheterize three times a day. pt was unable to catheterize this morning. Pt was able to urinate. Pt has question on procedure.   Call

## 2021-04-22 NOTE — TELEPHONE ENCOUNTER
S/W pt and determined that JOAN instructed him to sim to the Rt when placing his cath for CIC: see below:    CYSTOSCOPY   Anesthesia:  2% lidocaine gel   Urethra: Normal  Prostate / Pelvic: Abnormal Evidence of prior TUR of the prostate.   High riding [employer]

## 2021-04-23 ENCOUNTER — TELEPHONE (OUTPATIENT)
Dept: SURGERY | Facility: CLINIC | Age: 81
End: 2021-04-23

## 2021-04-23 NOTE — TELEPHONE ENCOUNTER
I placed the script for pt's CIC caths on [facility] desk asking that he sign and return to me.

## 2021-04-26 NOTE — TELEPHONE ENCOUNTER
JOAN returned the [employer] script and I faxed it to them and received a fax conf. And I sent the original script to scanning.

## 2021-05-05 ENCOUNTER — APPOINTMENT (OUTPATIENT)
Dept: CT IMAGING | Facility: HOSPITAL | Age: 81
DRG: 698 | End: 2021-05-05
Attending: EMERGENCY MEDICINE
Payer: MEDICARE

## 2021-05-05 ENCOUNTER — TELEPHONE (OUTPATIENT)
Dept: SURGERY | Facility: CLINIC | Age: 81
End: 2021-05-05

## 2021-05-05 ENCOUNTER — HOSPITAL ENCOUNTER (INPATIENT)
Facility: HOSPITAL | Age: 81
LOS: 2 days | Discharge: HOME | DRG: 698 | End: 2021-05-08
Attending: EMERGENCY MEDICINE | Admitting: INTERNAL MEDICINE
Payer: MEDICARE

## 2021-05-05 DIAGNOSIS — E86.0 DEHYDRATION: ICD-10-CM

## 2021-05-05 DIAGNOSIS — N30.01 ACUTE CYSTITIS WITH HEMATURIA: Primary | ICD-10-CM

## 2021-05-05 DIAGNOSIS — E87.6 HYPOKALEMIA: ICD-10-CM

## 2021-05-05 PROBLEM — I10 BENIGN ESSENTIAL HTN: Status: ACTIVE | Noted: 2018-04-05

## 2021-05-05 PROBLEM — E78.5 DYSLIPIDEMIA: Status: ACTIVE | Noted: 2018-04-05

## 2021-05-05 PROCEDURE — 99223 1ST HOSP IP/OBS HIGH 75: CPT | Performed by: UROLOGY

## 2021-05-05 PROCEDURE — 3E033XZ INTRODUCTION OF VASOPRESSOR INTO PERIPHERAL VEIN, PERCUTANEOUS APPROACH: ICD-10-PCS | Performed by: HOSPITALIST

## 2021-05-05 PROCEDURE — 99223 1ST HOSP IP/OBS HIGH 75: CPT | Performed by: INTERNAL MEDICINE

## 2021-05-05 PROCEDURE — 74176 CT ABD & PELVIS W/O CONTRAST: CPT | Performed by: EMERGENCY MEDICINE

## 2021-05-05 RX ORDER — ACETAMINOPHEN 160 MG
2000 TABLET,DISINTEGRATING ORAL DAILY
Status: DISCONTINUED | OUTPATIENT
Start: 2021-05-06 | End: 2021-05-08

## 2021-05-05 RX ORDER — ACETAMINOPHEN 10 MG/ML
1000 INJECTION, SOLUTION INTRAVENOUS EVERY 6 HOURS PRN
Status: DISCONTINUED | OUTPATIENT
Start: 2021-05-05 | End: 2021-05-07 | Stop reason: ALTCHOICE

## 2021-05-05 RX ORDER — GABAPENTIN 300 MG/1
300 CAPSULE ORAL 3 TIMES DAILY
COMMUNITY
End: 2021-08-12

## 2021-05-05 RX ORDER — AMLODIPINE BESYLATE 5 MG/1
10 TABLET ORAL DAILY
Status: DISCONTINUED | OUTPATIENT
Start: 2021-05-05 | End: 2021-05-05

## 2021-05-05 RX ORDER — ALBUMIN, HUMAN INJ 5% 5 %
250 SOLUTION INTRAVENOUS ONCE
Status: COMPLETED | OUTPATIENT
Start: 2021-05-05 | End: 2021-05-05

## 2021-05-05 RX ORDER — PANTOPRAZOLE SODIUM 40 MG/1
40 TABLET, DELAYED RELEASE ORAL
Status: DISCONTINUED | OUTPATIENT
Start: 2021-05-06 | End: 2021-05-05

## 2021-05-05 RX ORDER — HEPARIN SODIUM 5000 [USP'U]/ML
5000 INJECTION, SOLUTION INTRAVENOUS; SUBCUTANEOUS EVERY 8 HOURS SCHEDULED
Status: DISCONTINUED | OUTPATIENT
Start: 2021-05-05 | End: 2021-05-08

## 2021-05-05 RX ORDER — SODIUM CHLORIDE 9 MG/ML
INJECTION, SOLUTION INTRAVENOUS CONTINUOUS
Status: DISCONTINUED | OUTPATIENT
Start: 2021-05-05 | End: 2021-05-06

## 2021-05-05 RX ORDER — ACETAMINOPHEN 325 MG/1
650 TABLET ORAL EVERY 6 HOURS PRN
Status: DISCONTINUED | OUTPATIENT
Start: 2021-05-05 | End: 2021-05-08

## 2021-05-05 RX ORDER — ONDANSETRON 2 MG/ML
INJECTION INTRAMUSCULAR; INTRAVENOUS
Status: DISPENSED
Start: 2021-05-05 | End: 2021-05-06

## 2021-05-05 RX ORDER — HYDROCHLOROTHIAZIDE 25 MG/1
25 TABLET ORAL DAILY
COMMUNITY
End: 2021-09-16

## 2021-05-05 RX ORDER — METFORMIN HYDROCHLORIDE 750 MG/1
750 TABLET, EXTENDED RELEASE ORAL 2 TIMES DAILY
COMMUNITY
End: 2021-05-13

## 2021-05-05 RX ORDER — LISINOPRIL 40 MG/1
40 TABLET ORAL DAILY
COMMUNITY
End: 2021-08-18

## 2021-05-05 RX ORDER — OMEPRAZOLE 20 MG/1
40 CAPSULE, DELAYED RELEASE ORAL 2 TIMES DAILY
COMMUNITY
End: 2021-07-17

## 2021-05-05 RX ORDER — ONDANSETRON 2 MG/ML
4 INJECTION INTRAMUSCULAR; INTRAVENOUS EVERY 4 HOURS PRN
Status: DISCONTINUED | OUTPATIENT
Start: 2021-05-05 | End: 2021-05-05 | Stop reason: SDUPTHER

## 2021-05-05 RX ORDER — ASPIRIN 81 MG/1
81 TABLET, CHEWABLE ORAL DAILY
Status: DISCONTINUED | OUTPATIENT
Start: 2021-05-05 | End: 2021-05-08

## 2021-05-05 RX ORDER — SODIUM CHLORIDE 9 MG/ML
INJECTION, SOLUTION INTRAVENOUS CONTINUOUS
Status: DISCONTINUED | OUTPATIENT
Start: 2021-05-05 | End: 2021-05-05

## 2021-05-05 RX ORDER — SODIUM BICARBONATE 150 MEQ/1,000 ML IN DEXTROSE 5 % INTRAVENOUS
75 SOLUTION CONTINUOUS
Status: DISCONTINUED | OUTPATIENT
Start: 2021-05-05 | End: 2021-05-06

## 2021-05-05 RX ORDER — ONDANSETRON 2 MG/ML
4 INJECTION INTRAMUSCULAR; INTRAVENOUS ONCE
Status: COMPLETED | OUTPATIENT
Start: 2021-05-05 | End: 2021-05-05

## 2021-05-05 RX ORDER — FINASTERIDE 5 MG/1
5 TABLET, FILM COATED ORAL DAILY
Status: DISCONTINUED | OUTPATIENT
Start: 2021-05-06 | End: 2021-05-08

## 2021-05-05 RX ORDER — ATORVASTATIN CALCIUM 20 MG/1
20 TABLET, FILM COATED ORAL NIGHTLY
Status: DISCONTINUED | OUTPATIENT
Start: 2021-05-05 | End: 2021-05-08

## 2021-05-05 RX ORDER — CYCLOBENZAPRINE HCL 5 MG
5 TABLET ORAL NIGHTLY PRN
Status: DISCONTINUED | OUTPATIENT
Start: 2021-05-05 | End: 2021-05-08

## 2021-05-05 RX ORDER — DIAZEPAM 5 MG/1
10 TABLET ORAL NIGHTLY PRN
Status: DISCONTINUED | OUTPATIENT
Start: 2021-05-05 | End: 2021-05-08

## 2021-05-05 RX ORDER — AMLODIPINE BESYLATE 10 MG/1
10 TABLET ORAL DAILY
COMMUNITY
End: 2021-06-14

## 2021-05-05 RX ORDER — ONDANSETRON 2 MG/ML
4 INJECTION INTRAMUSCULAR; INTRAVENOUS EVERY 6 HOURS PRN
Status: DISCONTINUED | OUTPATIENT
Start: 2021-05-05 | End: 2021-05-08

## 2021-05-05 RX ORDER — SIMVASTATIN 40 MG
40 TABLET ORAL NIGHTLY
COMMUNITY
End: 2021-08-12

## 2021-05-05 RX ORDER — ALBUMIN, HUMAN INJ 5% 5 %
SOLUTION INTRAVENOUS
Status: DISPENSED
Start: 2021-05-05 | End: 2021-05-06

## 2021-05-05 RX ORDER — GABAPENTIN 300 MG/1
300 CAPSULE ORAL 3 TIMES DAILY
Status: DISCONTINUED | OUTPATIENT
Start: 2021-05-05 | End: 2021-05-05

## 2021-05-05 RX ORDER — METOCLOPRAMIDE HYDROCHLORIDE 5 MG/ML
5 INJECTION INTRAMUSCULAR; INTRAVENOUS EVERY 8 HOURS PRN
Status: DISCONTINUED | OUTPATIENT
Start: 2021-05-05 | End: 2021-05-08

## 2021-05-05 NOTE — ED INITIAL ASSESSMENT (HPI)
Pt presented to ED c/o fatigue, n/v. Pt states woke up felt fine straight cath self and went to work when pt got to work pt noted that he was tired and had chills, pt went home and was more fatigued and became nauseous with emesis x 5

## 2021-05-05 NOTE — CONSULTS
[facility]    Urology Consult Note    Arcelia Ben Patient Status:  Observation    1940 MRN [id number]   [facility] 6NE-A Attending Cyntha Leventhal, MD   Hosp Day # 0 PCP Castillo Torres MD     Date of Admission:   Use      Vaping Use: Never used    Alcohol use: Not Currently    Drug use: No       Allergies  No Known Allergies    Review of Systems:   A 10-point review of systems was completed and is negative other than as noted above.     Physical Exam:   Vital Signs: Patient is a 68year old male with a PMH of HTN, HL, GERD, BPH. He is s/p PVP in 2012, then TURP 2014 (Spe[facility]) with persistently elevated PVR and was told to continue CIC.  Has followed with Dr Mikayla Tipton more recently and told to increase CIC to TID for

## 2021-05-05 NOTE — H&P
[facility]  History and Physical     Rishabh Jimmy Patient Status:  Emergency    1940 MRN [id number]   Location [address] [facility] Attending Adam Talbot MD   Hosp Day # 0 PCP Gini Rowan MD     Chief Compla MG Oral Tablet 24 Hr, Take 750 mg by mouth 2 (two) times a day., Disp: , Rfl:   lisinopril 40 MG Oral Tab, Take 40 mg by mouth daily. , Disp: , Rfl:   finasteride 5 MG Oral Tab, Take 1 tablet (5 mg total) by mouth daily. , Disp: 90 tablet, Rfl: 3  hydrocorti wheezes. No rhonchi. Cardiovascular: S1, S2. Regular rate and rhythm. No murmurs, rubs or gallops. Equal pulses. Chest and Back: No tenderness or deformity. Abdomen: Soft, nontender, nondistended. Positive bowel sounds.  No rebound, guarding or organom with patient, ED Physician     Amber Vaughan MD  5/5/2021

## 2021-05-05 NOTE — ED QUICK NOTES
Nurse to Nurse report called to [facility]. Pt resting comfortably on cart, Wife at bedside. VSS in NAD. Patient transport ordered.

## 2021-05-05 NOTE — ED PROVIDER NOTES
Patient Seen in: [facility] Emergency Department      History   Patient presents with:   Body ache and/or chills  Bleeding  Nausea/Vomiting/Diarrhea    Stated Complaint: chills, hematuria    HPI/Subjective:   HPI    61-year-old male presents to the e Current:/55   Pulse 108   Temp 98.3 °F (36.8 °C) (Temporal)   Resp 20   Ht 172.7 cm (5' 8\")   Wt 77.1 kg   SpO2 (!) 89%   BMI 25.85 kg/m²         Physical Exam  Vitals and nursing note reviewed. Chaperone present: Daughter in room.    Constitut Urine >10 (*)     Bacteria Urine 3+ (*)     All other components within normal limits   RBC MORPHOLOGY SCAN - Abnormal; Notable for the following components:    RBC Morphology See morphology below (*)     All other components within normal limits   URINE M from above the kidneys to below the urinary bladder. 2D rendering are generated on the CT scanner workstation to localize potential stones in the cranio-caudal plane. Dose reduction techniques were used.  Dose information is transmitted to the ACR (Americ indicated. Dictated by (CST): Ines Huang MD on 5/05/2021 at 11:57 AM     Finalized by (CST): Ines Huang MD on 5/05/2021 at 12:03 PM            MDM      Patient had IV established and blood work obtained.   I was concerned regarding a possible bowel obstru

## 2021-05-05 NOTE — TELEPHONE ENCOUNTER
Patients wife Conor Cherry requesting a call from nurse, no further details, please call at [phone],NUXPIB.

## 2021-05-05 NOTE — PLAN OF CARE
Received pt from ER. Pt shevering stating that he is very cold,Temp normal, Pt feels hot to touch, HR elevated. Hospitalist paged, new orders received. Lab reported critical lactic acid, orders received to transfer pt to critical care.  Poc updated with s

## 2021-05-05 NOTE — CONSULTS
[facility] Note  [facility]  Report of Consultation    Derrick Hurst Patient Status:  Observation    1940 MRN [id number]   [facility] 6NE-A Attending MD Jimena Cruz 5,000 Units Subcutaneous Q8H [facility]   • [START ON 5/6/2021] cefTRIAXone  1 g Intravenous Q24H   • ondansetron HCl         diazepam, cyclobenzaprine, acetaminophen, ondansetron HCl, Metoclopramide HCl    Review of Systems:  lethargic  Constitutional:  :hemat 206.0     No results for input(s): BNP in the last 168 hours. No results for input(s): TROP, CK in the last 168 hours. No results for input(s): PT, INR, PTT in the last 168 hours. No results for input(s): CRP, NINI, DDIMER in the last 168 hours.         A base and no ureteral calcification  · Anemia (hgb at baseline)  · Trend lactic acid. Check abg  · Proph: hep subcut  · D/w rn and wife at bedside  · cctime 45 min    Thank you for the consultation. Will follow with you.     Akbar Burt MD

## 2021-05-06 ENCOUNTER — APPOINTMENT (OUTPATIENT)
Dept: GENERAL RADIOLOGY | Facility: HOSPITAL | Age: 81
DRG: 698 | End: 2021-05-06
Attending: NURSE PRACTITIONER
Payer: MEDICARE

## 2021-05-06 ENCOUNTER — APPOINTMENT (OUTPATIENT)
Dept: CV DIAGNOSTICS | Facility: HOSPITAL | Age: 81
DRG: 698 | End: 2021-05-06
Attending: HOSPITALIST
Payer: MEDICARE

## 2021-05-06 PROCEDURE — 71045 X-RAY EXAM CHEST 1 VIEW: CPT | Performed by: NURSE PRACTITIONER

## 2021-05-06 PROCEDURE — 02HV33Z INSERTION OF INFUSION DEVICE INTO SUPERIOR VENA CAVA, PERCUTANEOUS APPROACH: ICD-10-PCS | Performed by: INTERNAL MEDICINE

## 2021-05-06 PROCEDURE — 99233 SBSQ HOSP IP/OBS HIGH 50: CPT | Performed by: INTERNAL MEDICINE

## 2021-05-06 PROCEDURE — 93306 TTE W/DOPPLER COMPLETE: CPT | Performed by: HOSPITALIST

## 2021-05-06 PROCEDURE — 5A0945A ASSISTANCE WITH RESPIRATORY VENTILATION, 24-96 CONSECUTIVE HOURS, HIGH NASAL FLOW/VELOCITY: ICD-10-PCS | Performed by: INTERNAL MEDICINE

## 2021-05-06 PROCEDURE — 99231 SBSQ HOSP IP/OBS SF/LOW 25: CPT | Performed by: UROLOGY

## 2021-05-06 PROCEDURE — 99223 1ST HOSP IP/OBS HIGH 75: CPT | Performed by: INTERNAL MEDICINE

## 2021-05-06 PROCEDURE — B548ZZA ULTRASONOGRAPHY OF SUPERIOR VENA CAVA, GUIDANCE: ICD-10-PCS | Performed by: INTERNAL MEDICINE

## 2021-05-06 RX ORDER — FUROSEMIDE 10 MG/ML
20 INJECTION INTRAMUSCULAR; INTRAVENOUS ONCE
Status: COMPLETED | OUTPATIENT
Start: 2021-05-06 | End: 2021-05-06

## 2021-05-06 RX ORDER — DIPHENHYDRAMINE HCL 25 MG
25 CAPSULE ORAL ONCE
Status: COMPLETED | OUTPATIENT
Start: 2021-05-06 | End: 2021-05-06

## 2021-05-06 RX ORDER — POTASSIUM CHLORIDE 29.8 MG/ML
40 INJECTION INTRAVENOUS ONCE
Status: COMPLETED | OUTPATIENT
Start: 2021-05-06 | End: 2021-05-06

## 2021-05-06 NOTE — TELEPHONE ENCOUNTER
Patient's wife contacted. Patient is noted to be in [facility], admitted yesterday, difficulty with self cath. See Dr. Dubose Scot consult note from yesterday. Dr. Ulice Ran, Lattie Blizzard.

## 2021-05-06 NOTE — PROGRESS NOTES
[facility]  Progress Note     Autumn Rivera Patient Status:  Observation    1940 MRN [id number]   [facility] 6NE-A Attending Jordan Mart MD   Hosp Day # 0 PCP Zina Hua MD     Chief Complaint: fatigue, chills results for input(s): PTP, INR in the last 168 hours. COVID-19 Lab Results    COVID-19  Lab Results   Component Value Date    COVID19 Not Detected 05/05/2021       Pro-Calcitonin  No results for input(s): PCT in the last 168 hours.     Cardiac  Rece point Mr. Emery Fishman is expected to be discharge to: home    Plan of care discussed with patient, Nishant Henry MD

## 2021-05-06 NOTE — PROGRESS NOTES
[facility] Pulmonary/Critical Care Progress Note     SUBJECTIVE/Interval history: All events, procedures, notes reviewed. He feels 'good' today, denies dyspnea, cough or pain.  Fevers improved down to 101 this am. Remain  142   K 3.5 3.5    111   CO2 24.0 21.0     Recent Labs   Lab 05/05/21  1027 05/06/21  0514   RBC 5.43 4.18   HGB 10.7* 8.3*   HCT 35.5* 27.2*   MCV 65.4* 65.1*   MCH 19.7* 19.9*   MCHC 30.1* 30.5*   RDW 15.3* 15.7*   NEPRELIM 8.42* 28.10* Yobani Tate MD on 5/06/2021 at 7:23 AM         • meropenem  500 mg Intravenous Q12H   • potassium chloride  40 mEq Intravenous Once   • aspirin  81 mg Oral Daily   • finasteride  5 mg Oral Daily   • atorvastatin  20 mg Oral Nightly   • Vitamin D3 (Chol

## 2021-05-06 NOTE — PROGRESS NOTES
[facility]     Progress Note    Edwin Keller [address]  [phone]    Lor Shelton Patient Status:  Observation    1940 MRN [id number]   [facility] 6NE-A Attending Tyrell Johnson MD   Hosp Day # 0 PCP Princess Montano, [facility] , CT, CT ABD(C/S)/PELVIS(C) (SET), 12/21/2009, 9:39 PM.  INDICATIONS:  chills, hematuria. ruq pain  TECHNIQUE:  Unenhanced multislice CT scanning from above the kidneys to below the urinary bladder.   2D rendering are generated on the Liquid X cyst are incompletely characterized on this noncontrast study. Recommend dedicated CT of the kidneys are ultrasound for further evaluation as clinically indicated.    Dictated by (CST): Maria E Salvador MD on 5/05/2021 at 11:57 AM     Finalized by (CST): Monisha Barnes

## 2021-05-06 NOTE — CM/SW NOTE
05/06/21 1400   CM/SW Screening   Referral Source    Information Source Chart review;Nursing rounds   Patient's Mental Status Alert;Oriented   Patient lives with Spouse   Patient Status Prior to Admission   Independent with ADLs and Mobility

## 2021-05-06 NOTE — PLAN OF CARE
Assumed patient care at [address] [place] [address]. Patient on 7L NC, SBP dropping to the 80s, sinus tachy, mentation slightly delayed at times. Pulm order of albumin 250 given. Started on levophed to keep SBP above 90.  Steadily increasing dosage throughout the night, pulm page

## 2021-05-06 NOTE — PLAN OF CARE
Assumed care from the floor 1725. Drowsy but following commands. Fluid bolus infusing. Additional IV's placed. Dr. Carlos Villalba to bedside. See orders. Dr. Chan Pryor to bedside, orders received. Attempted to place archer, met with significant resistance.  Paged Dr. Suki Gunderson

## 2021-05-06 NOTE — CONSULTS
[facility]  Report of Consultation    Kimberly Noemifreeman Patient Status:  Observation    1940 MRN [id number]   [facility] 6NE-A Attending Nadir Hernandez MD   Hosp Day # 0 PCP Luis Weston MD     Reason for Consultation:  E IVPB-MBP, 500 mg, Intravenous, Q12H  •  potassium chloride IVPB premix 40 mEq, 40 mEq, Intravenous, Once  •  Pantoprazole Sodium (PROTONIX) 40 mg in Sodium Chloride (PF) 0.9 % 10 mL IV push, 40 mg, Intravenous, Q12H  •  aspirin chewable tab 81 mg, 81 mg, O appears acutely ill. HEENT: No icterus, conjunctiva unremarkable. Neck: No JVD, carotids 2+ no bruits. Cardiac: Regular rate and rhythm, S1, S2 normal, no murmur, rub or gallop. Lungs: Clear without wheezes, rales, rhonchi or dullness.   Normal excursio ordered  4. Lactic acidosis- improving  5. Neurogenic bladder- urology following  6. Coffee ground emesis following recurrent bouts of n/v- GI following. 7. htn- home antihypertensives on hold for now   8. Hyperlipidemia- ldl 60 on current statin dose  9.

## 2021-05-07 ENCOUNTER — APPOINTMENT (OUTPATIENT)
Dept: GENERAL RADIOLOGY | Facility: HOSPITAL | Age: 81
DRG: 698 | End: 2021-05-07
Attending: INTERNAL MEDICINE
Payer: MEDICARE

## 2021-05-07 PROCEDURE — 99231 SBSQ HOSP IP/OBS SF/LOW 25: CPT | Performed by: UROLOGY

## 2021-05-07 PROCEDURE — 99232 SBSQ HOSP IP/OBS MODERATE 35: CPT | Performed by: INTERNAL MEDICINE

## 2021-05-07 PROCEDURE — 99233 SBSQ HOSP IP/OBS HIGH 50: CPT | Performed by: INTERNAL MEDICINE

## 2021-05-07 PROCEDURE — 71045 X-RAY EXAM CHEST 1 VIEW: CPT | Performed by: INTERNAL MEDICINE

## 2021-05-07 RX ORDER — TEMAZEPAM 15 MG/1
15 CAPSULE ORAL NIGHTLY PRN
Status: DISCONTINUED | OUTPATIENT
Start: 2021-05-07 | End: 2021-05-07

## 2021-05-07 RX ORDER — HALOPERIDOL 5 MG/ML
1 INJECTION INTRAMUSCULAR EVERY 6 HOURS PRN
Status: DISCONTINUED | OUTPATIENT
Start: 2021-05-07 | End: 2021-05-08

## 2021-05-07 RX ORDER — PANTOPRAZOLE SODIUM 40 MG/1
40 TABLET, DELAYED RELEASE ORAL
Status: DISCONTINUED | OUTPATIENT
Start: 2021-05-08 | End: 2021-05-08

## 2021-05-07 RX ORDER — POTASSIUM CHLORIDE 29.8 MG/ML
40 INJECTION INTRAVENOUS ONCE
Status: COMPLETED | OUTPATIENT
Start: 2021-05-07 | End: 2021-05-07

## 2021-05-07 NOTE — PLAN OF CARE
Pt. Follows commands, no complaints of pain, vitals stable, pt. Transferred to room 7622 all personal belongings with pt. And daughter, pt. Sitting in chair and eating today, no signs of bleeding.

## 2021-05-07 NOTE — PLAN OF CARE
Assumed care of patient at 0730. Alert and oriented x4. Received pt on levophed gtt. Able to titrate gtt off to keep systolic over 90. PICC in place. Cristobal in place per urology. Echo completed at bedside. Pt denies cp.  Troponin elevated, cardiology consult

## 2021-05-07 NOTE — PROGRESS NOTES
RT called to bedside this evening for patient with increasing O2 requirements. ABG was drawn on 50% Venti mask per Dr. Calista Meadows. Results reported and Vapotherm 40L/100% initiated per Dr. Calista Meadows. Currently, patient saturating 92%. Will wean Vapotherm if able.

## 2021-05-07 NOTE — PROGRESS NOTES
[facility]  Progress Note    Paddy Notice Patient Status:  Inpatient    1940 MRN [id number]   [facility] 7NE-A Attending Bri Lang MD   Hosp Day # 1 PCP Porsha Moon MD     Subjective:  No gi bleeding.   Gracia Quick Objective:  Blood pressure 137/62, pulse 106, temperature 98 °F (36.7 °C), temperature source Oral, resp. rate 22, height 5' 8\" (1.727 m), weight 179 lb 3.7 oz (81.3 kg), SpO2 92 %.       EXAM:  /62 (BP Location: Right arm)   Pulse 106   Temp

## 2021-05-07 NOTE — PROGRESS NOTES
[facility] Pulmonary/Critical Care Progress Note     SUBJECTIVE/Interval history: All events, procedures, notes reviewed. Pt required vapotherm overnight and now 6 L 02. He had BM overnight. krupa lanier cp or terri Hart 8.0*    142 144   K 3.5 3.5 3.6    111 110   CO2 24.0 21.0 26.0     Recent Labs   Lab 05/05/21  1027 05/05/21  1027 05/06/21  0514 05/06/21  0514 05/06/21  1246 05/06/21  1246 05/06/21 2008 05/07/21  0317 05/07/21  0746   RBC 5.43   < > 4.18 limits of normal,      estimated to be 35mm Hg    Interval Culture Data:   Hospital Encounter on 05/05/21   1.  BLOOD CULTURE     Status: None (Preliminary result)    Collection Time: 05/05/21  6:56 PM    Specimen: Blood,peripheral   Result Value Ref Range 5/7/2021  CONCLUSION:      Worsening appearance, with increasing haziness across the mid and lower chest on the right, probably reflecting increasing edema and effusion. Milder similar changes on the left. Stable PICC line catheter. Stable heart size. improving. Ask GI if pt can eat  · Appreciate cardio c/s. Elevated trop.  Reviewed echow ith no segmental wall motion abnormality  · Proph; hep subcut/ ppi  · D/w rn  · cctime 40 min      Jessica Cruz MD

## 2021-05-07 NOTE — CDS QUERY
Clarification - Potential Diagnosis Association  CLINICAL DOCUMENTATION CLARIFICATION FORM  Dear Doctor:  Clinical information in the patient's medical record (provided below) includes documentation of diagnoses with potential association.  For accurate ICD

## 2021-05-07 NOTE — PROGRESS NOTES
[facility]     Progress Note    Edwin Keller 238  [phone]    [facility] Patient Status:  Inpatient    1940 MRN [id number]   [facility] 6NE-A Attending Candelaria Wallace MD   [facility] Day # 1 PCP Luz Elena Lowery

## 2021-05-07 NOTE — PLAN OF CARE
Assumed patient care at [address] [place] [address]. Patient alert/intact, but confused at times. Received patient on 6L NC, unable to maintain O2 sats > 89%. Attempted to place venturi mask, but patient refused. Expiratory wheezing heard on auscultation.  Dr. Timo Christianson updated, see

## 2021-05-07 NOTE — PROGRESS NOTES
[facility]  Progress Note     Dm Ortiz Patient Status:  Observation    1940 MRN [id number]   [facility] 6NE-A Attending Valeria Lara MD   Hosp Day # 1 PCP Nehal Ho MD     Chief Complaint: fatigue, chills  142 144   K 3.5 3.5 3.6    111 110   CO2 24.0 21.0 26.0   ALKPHO 64  --  73   AST 20  --  63*   ALT 40  --  37   BILT 1.0  --  1.0   TP 7.2  --  6.0*       Estimated Creatinine Clearance: 34.2 mL/min (A) (based on SCr of 1.72 mg/dL (H)). to floor if O2 needs improve    Quality:  · DVT Prophylaxis: SCD  · CODE status: full  · Cristobal: yes  · Central line: none  · If COVID testing is negative, may discontinue isolation: yes     Will the patient be referred to [facility] on discharge?: tbd  Estimated

## 2021-05-07 NOTE — PROGRESS NOTES
[facility]  Progress Note    Naldo Cruz Patient Status:  Inpatient    1940 MRN [id number]   [facility] 7NE-A Attending Tamia Pelayo MD   Hosp Day # 1 PCP Lexx Laughlin MD     Subjective:  Feels well.   Much bett urosepsis secondary to cystitis with hematuria. Clinically dramatically improved. Resolved septic shock with ANITRA on CKD. 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 No further evaluation or testing at this time.   Minimally e

## 2021-05-08 ENCOUNTER — TELEPHONE (OUTPATIENT)
Dept: SURGERY | Facility: CLINIC | Age: 81
End: 2021-05-08

## 2021-05-08 VITALS
TEMPERATURE: 98 F | WEIGHT: 175.5 LBS | HEIGHT: 68 IN | BODY MASS INDEX: 26.6 KG/M2 | HEART RATE: 102 BPM | OXYGEN SATURATION: 91 % | DIASTOLIC BLOOD PRESSURE: 63 MMHG | RESPIRATION RATE: 20 BRPM | SYSTOLIC BLOOD PRESSURE: 124 MMHG

## 2021-05-08 PROCEDURE — 99239 HOSP IP/OBS DSCHRG MGMT >30: CPT | Performed by: INTERNAL MEDICINE

## 2021-05-08 PROCEDURE — 99232 SBSQ HOSP IP/OBS MODERATE 35: CPT | Performed by: UROLOGY

## 2021-05-08 RX ORDER — POTASSIUM CHLORIDE 14.9 MG/ML
20 INJECTION INTRAVENOUS ONCE
Status: COMPLETED | OUTPATIENT
Start: 2021-05-08 | End: 2021-05-08

## 2021-05-08 RX ORDER — CEPHALEXIN 500 MG/1
500 CAPSULE ORAL 2 TIMES DAILY
Qty: 28 CAPSULE | Refills: 0 | Status: SHIPPED | OUTPATIENT
Start: 2021-05-08 | End: 2021-05-22

## 2021-05-08 RX ORDER — FUROSEMIDE 10 MG/ML
20 INJECTION INTRAMUSCULAR; INTRAVENOUS ONCE
Status: COMPLETED | OUTPATIENT
Start: 2021-05-08 | End: 2021-05-08

## 2021-05-08 NOTE — DISCHARGE SUMMARY
[facility]PITALIST  DISCHARGE SUMMARY     Lola Power Patient Status:  Inpatient    1940 MRN [id number]   [facility] 7NE-A Attending Aniket Serna MD   Hosp Day # 2 PCP Chato Montano MD     Date of Admission: 2021  D days.   Stop taking on: May 22, 2021  Quantity: 28 capsule  Refills: 0        CONTINUE taking these medications      Instructions Prescription details   acetaminophen 500 MG Tabs  Commonly known as: TYLENOL EXTRA STRENGTH      Take 1,000 mg by mouth 3 (thr (Cholecalciferol) 50 MCG (2000 UT) Caps      Take 2,000 Units by mouth daily.    Refills: 0           Where to Get Your Medications      Please  your prescriptions at the location directed by your doctor or nurse    Bring a paper prescription for eac

## 2021-05-08 NOTE — PROGRESS NOTES
[facility]    Progress Note    Lola Berry Patient Status:  Inpatient    1940 MRN [id number]   [facility] 7NE-A Attending Aniket Serna MD   Hosp Day # 2 PCP Chato Montano MD       Subjective:   Lola Berry similar changes on the left. Stable PICC line catheter. Stable heart size. Vascularity indistinct. No pneumothorax.      Dictated by (CST): Meli Wahl MD on 5/07/2021 at 6:53 AM     Finalized by (CST): Meli Wahl MD on 5/07/2021 at 6:54 AM

## 2021-05-08 NOTE — PHYSICAL THERAPY NOTE
PHYSICAL THERAPY EVALUATION - INPATIENT     Room Number: 2087/0832-F  Evaluation Date: 5/8/2021  Type of Evaluation: Initial  Physician Order: PT Eval and Treat    Presenting Problem: UTI, ANITRA, septic shock   Reason for Therapy: Mobility Dysfunction • PROSTATE LASER ENUCLEATION     • SKIN SURGERY  02/27/2019    Mohs/L superior him/SCC/done by MM       HOME SITUATION  Type of Home: House   Home Layout: One level  Stairs to Enter : 4 (or 1 step through garage with rail )  Railing: No  Stairs to [facility] [employer] minute): 4  SPO2% Ambulation on Oxygen: 90  Ambulation oxygen flow (liters per minute): 6    AM-PAC '6-Clicks' INPATIENT SHORT FORM - BASIC MOBILITY  How much difficulty does the patient currently have. ..  -   Turning over in bed (including adjusting bedcl Provided:  Energy conservation  Functional activity tolerated  Gait training  Strengthening  Transfer training    Patient End of Session: Up in chair;Needs met;Call light within reach;RN aware of session/findings; All patient questions and concerns Emma Cueva independent.     Goal #5    Goal #6    Goal Comments: Goals established on 5/8/2021    PT wore PPE: gloves, goggles, mask  Pt wore a mask for 1/2 of session  Pt's spouse wore a mask  Pt's son wore a mask for 1/2 of session

## 2021-05-08 NOTE — PROGRESS NOTES
[facility] Pulmonary/Critical Care Progress Note     SUBJECTIVE/Interval history: All events, procedures, notes reviewed. pt feels well and denies cp or sob. He is on room air with O2 sat 95%.      Review of Systems:   A 26.0 25.0     Recent Labs   Lab 05/06/21  0514 05/06/21  0514 05/06/21  1246 05/06/21 2008 05/07/21  0317 05/07/21  0317 05/07/21  0746 05/08/21  0641 05/08/21  0943   RBC 4.18   < > 3.54*  --  4.29  --   --  4.44  --    HGB 8.3*   < > 7.4*   < > 8.7*   < Sensitive      Meropenem <=0.25 Sensitive      Levofloxacin <=0.12 Sensitive      Nitrofurantoin 64 Intermediate      Piperacillin + Tazobactam <=4 Sensitive      Trimethoprim/Sulfa <=20 Sensitive      Recent Labs   Lab 05/05/21  1208   COLORUR Red*   CLAR HCl, Metoclopramide HCl, norepinephrine, vasopressin (PITRESSIN) infusion for shock    ASSESSMENT    · Septic shock due to suspected complicated UTI  · Abnormal UA, complicated UTI with Klebsiella  · Possible upper GI bleed, coffee ground emesis  · Acute r

## 2021-05-08 NOTE — TELEPHONE ENCOUNTER
Urology staff this pt needs to see me for followup office visit in 10 days. Please schedule.  Thanks

## 2021-05-08 NOTE — PLAN OF CARE
Assumed care @ 1900. On telemetry monitoring. Alert oriented x3-4 Forgetful  Patient requesting sleeping pill  Updated patient with plan of care  Ensures patient safety. Maintained a calm and safe environment. Side rails up x2. Needs attended.   Call medications to maintain glucose within target range  - Assess barriers to adequate nutritional intake and initiate nutrition consult as needed  - Instruct patient on self management of diabetes  Outcome: Progressing  Goal: Electrolytes maintained within no

## 2021-05-08 NOTE — PLAN OF CARE
Assumed care of patient at  07:30 am  A/O x 4, NSR/ST per tele. Weaned to RA at beginning of shift, SPO2 maintained =/> 90%  Oxygen Rest/Walk test completed. Tolerated ambulating in halls. Cristobal in place, draining adequate urine output.  Cristobal care pro maintain glucose within target range  - Assess barriers to adequate nutritional intake and initiate nutrition consult as needed  - Instruct patient on self management of diabetes  5/8/2021 1555 by Colleen Raines RN  Outcome: Adequate for Discharge  5/8/20 Reece Ritter, RN  Outcome: Adequate for Discharge  5/8/2021 1554 by Reece Ritter RN  Outcome: Adequate for Discharge  Goal: Patient/Family Short Term Goal  Description: Patient's Short Term Goal: Discharge home    Interventions:   - Monitor labs, sudha

## 2021-05-08 NOTE — PROGRESS NOTES
[facility]PITAL[facility]  Progress Note     Yolanda Nova Patient Status:  Observation    1940 MRN [id number]   [facility] 6NE-A Attending Devaughn Cheung MD   Hosp Day # 2 PCP Renetta Rocha MD     Chief Complaint: fatigue, chills 1.72* 1.26   GFRAA 47*   < > 34* 43* 63   GFRNAA 41*   < > 29* 37* 54*   CA 9.3   < > 7.5* 8.0* 8.8   ALB 3.9  --   --  2.7* 2.9*      < > 142 144 145   K 3.5   < > 3.5 3.6 3.6      < > 111 110 113*   CO2 24.0   < > 21.0 26.0 25.0   ALKPHO 64 shcok  2. Check ECHO, grade 1 diastolic dysfunction  3. Cardiology to eval  7. Ess HTN  1. contnue to hold amlodipine, hydrochlorothiazide and ACE for now  8. Dyslipidemia  1. statin  9. GERD  1. PPI  10.  Prior DVT    Plan of care: DC planning    Quality:

## 2021-05-08 NOTE — PLAN OF CARE
Received pt as transfer from CNICU this afternoon  Pt AxOx4, SR/ST on tele  C/o dyspnea on exertion. On 5-6L at rest, increased O2 when up to bathroom  Multiple soft stools. MD notified. No orders received.   Cristobal in place, adequate UO  IV abx given per or

## 2021-05-08 NOTE — PROGRESS NOTES
Oxygen Rest/Walk test results       05/08/21 1209   Mobility   O2 walk?  Yes   SPO2% on Room Air at Rest 93   At rest oxygen flow (liters per minute) 0   SPO2% Ambulation on Room Air 90

## 2021-05-09 NOTE — PROGRESS NOTES
NURSING DISCHARGE NOTE      Discharge AVS completed   Patient medically cleared to discharge home per all consults  Discharge orders and instructions given and reviewed with patient and wife. All questions answered, verbalized understanding.   [facility]

## 2021-05-10 ENCOUNTER — PATIENT MESSAGE (OUTPATIENT)
Dept: INTERNAL MEDICINE CLINIC | Facility: CLINIC | Age: 81
End: 2021-05-10

## 2021-05-10 ENCOUNTER — PATIENT OUTREACH (OUTPATIENT)
Dept: CASE MANAGEMENT | Age: 81
End: 2021-05-10

## 2021-05-10 DIAGNOSIS — Z02.9 ENCOUNTERS FOR ADMINISTRATIVE PURPOSE: ICD-10-CM

## 2021-05-10 PROCEDURE — 1111F DSCHRG MED/CURRENT MED MERGE: CPT

## 2021-05-10 NOTE — PROGRESS NOTES
NCM attempted to contact patient for hospital follow up. Unable to leave message as line continues to ring with no option to leave a message. NCM will try again later.

## 2021-05-10 NOTE — TELEPHONE ENCOUNTER
S/w pt & wife Ondina Mitchell); identity verified with name & . Pt performs CIC 3Xdly. On 21 noted hematuria, & presented to [facility]/ ER; adm to ICU with septic shock. He was given IV ABX & discharged 21 with archer.  Per [facility] instructions for OV 10 days post o

## 2021-05-11 RX ORDER — GARLIC EXTRACT 500 MG
1 CAPSULE ORAL DAILY
COMMUNITY
End: 2021-07-17 | Stop reason: ALTCHOICE

## 2021-05-11 NOTE — TELEPHONE ENCOUNTER
This is not unusual after IVFs given for sepsis, should improve now that he is off IVF and on hydrochlorothiazide. UC or ER if any SOB, worsening edema, fevers, chills.

## 2021-05-11 NOTE — TELEPHONE ENCOUNTER
From: Sofiya Vega  To: Leeann James MD  Sent: 5/10/2021 6:18 PM CDT  Subject: Other    Pasha is being treated for a UTI and sepsis and in the hospital from 5-5 and released 5-8 with continuing antibiotics and a Cristobal catheter.  We are scheduled to

## 2021-05-11 NOTE — TELEPHONE ENCOUNTER
Called and spoke with wife, Alondra Will. Alondra Will reports the photos attached are from last night, 5/10. She states Mik received a LOT of IVF in the hospital due to sepsis and now has some BLE swelling. While in the hospital, was not taking heart meds.  Disc

## 2021-05-11 NOTE — PROGRESS NOTES
Initial Post Discharge Follow Up   Discharge Date: 5/8/21  Contact Date: 5/10/2021    Consent Verification:  Assessment Completed With: Spouse: Ximenaalistair Garsiajeff received per patient?  written  HIPAA Verified?   Yes    Discharge Dx:  Acute cystitis with he Tablet 24 Hr Take 750 mg by mouth 2 (two) times a day. • lisinopril 40 MG Oral Tab Take 40 mg by mouth daily. • finasteride 5 MG Oral Tab Take 1 tablet (5 mg total) by mouth daily.  90 tablet 3   • hydrocortisone 2.5 % External Ointment Apply to AAs Your appointments     Date & Time Appointment Department [facility])    May 13, 2021  9:40 AM CDT Hospital Follow Up with Brian Cee MD [address] [place] [address] ([facility] [place])        May 18, 2021  8:40 AM CDT Exam - checked his bp. Med review completed. She confirmed patient's appt with PCP on 5/13/2021 and denied having any questions or concerns at this time.      CCM referral placed:  No    [x]  Discharge Summary, Discharge medications reviewed/discussed/and reconcil

## 2021-05-11 NOTE — TELEPHONE ENCOUNTER
Robles Kearns advised of recommendations and given strong ER warning signs. Hailey verbalized understanding and will call with any updates.

## 2021-05-13 ENCOUNTER — OFFICE VISIT (OUTPATIENT)
Dept: INTERNAL MEDICINE CLINIC | Facility: CLINIC | Age: 81
End: 2021-05-13
Payer: MEDICARE

## 2021-05-13 VITALS
DIASTOLIC BLOOD PRESSURE: 64 MMHG | HEIGHT: 68 IN | RESPIRATION RATE: 16 BRPM | TEMPERATURE: 98 F | BODY MASS INDEX: 26.98 KG/M2 | SYSTOLIC BLOOD PRESSURE: 122 MMHG | HEART RATE: 72 BPM | WEIGHT: 178 LBS

## 2021-05-13 DIAGNOSIS — Z86.19 HISTORY OF SEPSIS: ICD-10-CM

## 2021-05-13 DIAGNOSIS — R77.8 ELEVATED TROPONIN: ICD-10-CM

## 2021-05-13 DIAGNOSIS — D72.828 OTHER ELEVATED WHITE BLOOD CELL (WBC) COUNT: ICD-10-CM

## 2021-05-13 DIAGNOSIS — I10 BENIGN ESSENTIAL HTN: ICD-10-CM

## 2021-05-13 DIAGNOSIS — N39.0 UTI DUE TO KLEBSIELLA SPECIES: Primary | ICD-10-CM

## 2021-05-13 DIAGNOSIS — B96.89 UTI DUE TO KLEBSIELLA SPECIES: Primary | ICD-10-CM

## 2021-05-13 DIAGNOSIS — R60.0 LOWER EXTREMITY EDEMA: ICD-10-CM

## 2021-05-13 DIAGNOSIS — D69.6 THROMBOCYTOPENIA (HCC): ICD-10-CM

## 2021-05-13 DIAGNOSIS — N17.9 AKI (ACUTE KIDNEY INJURY) (HCC): ICD-10-CM

## 2021-05-13 PROCEDURE — 99495 TRANSJ CARE MGMT MOD F2F 14D: CPT | Performed by: INTERNAL MEDICINE

## 2021-05-13 PROCEDURE — 1111F DSCHRG MED/CURRENT MED MERGE: CPT | Performed by: INTERNAL MEDICINE

## 2021-05-13 RX ORDER — METFORMIN HYDROCHLORIDE 750 MG/1
750 TABLET, EXTENDED RELEASE ORAL
Qty: 90 TABLET | Refills: 0 | COMMUNITY
Start: 2021-05-13 | End: 2021-10-22

## 2021-05-13 NOTE — PROGRESS NOTES
HPI:    Ace Masterson is a 68year old male here today for hospital follow up.    He was discharged from Inpatient hospital, [facility] to Home   Admission Date: 5/5/21   Discharge Date: 5/8/21  Hospital Discharge Diagnoses (since 4/13/2021) to be r/t GE junction or gastric trauma from repeated nausea and vomiting. His hgb was stable so no EGD/cscope done. Patient received IV ABX in the hospital and discharged home on cephalexin for 14 days.  He was sent home with a archer and he is seeing Dr. Anjali Russo reconciled and reviewed with patient  He  has a past medical history of DVT, lower extremity ([address] Esophageal reflux, Other and unspecified hyperlipidemia, and Unspecified essential hypertension.     He  has a past surgical history that includes prostate l extremities  EXTREMITIES: no cyanosis, clubbing, 1+ edema b/l LE  NEURO: Oriented times three    ASSESSMENT/ PLAN:   Diagnoses and all orders for this visit:    UTI due to Klebsiella species- continue cephalexin until 5/22/2021, push water    History of se Julián Levine MD, 5/13/2021

## 2021-05-18 ENCOUNTER — TELEPHONE (OUTPATIENT)
Dept: SURGERY | Facility: CLINIC | Age: 81
End: 2021-05-18

## 2021-05-18 ENCOUNTER — OFFICE VISIT (OUTPATIENT)
Dept: SURGERY | Facility: CLINIC | Age: 81
End: 2021-05-18
Payer: MEDICARE

## 2021-05-18 VITALS
HEART RATE: 88 BPM | WEIGHT: 178 LBS | SYSTOLIC BLOOD PRESSURE: 154 MMHG | DIASTOLIC BLOOD PRESSURE: 74 MMHG | BODY MASS INDEX: 27 KG/M2

## 2021-05-18 DIAGNOSIS — R33.9 URINARY RETENTION: Primary | ICD-10-CM

## 2021-05-18 DIAGNOSIS — N40.1 BENIGN PROSTATIC HYPERPLASIA WITH LOWER URINARY TRACT SYMPTOMS, SYMPTOM DETAILS UNSPECIFIED: ICD-10-CM

## 2021-05-18 PROCEDURE — 99214 OFFICE O/P EST MOD 30 MIN: CPT | Performed by: UROLOGY

## 2021-05-18 RX ORDER — CEPHALEXIN 250 MG/1
250 CAPSULE ORAL NIGHTLY
Qty: 14 CAPSULE | Refills: 0 | Status: SHIPPED | OUTPATIENT
Start: 2021-05-18 | End: 2021-07-17 | Stop reason: ALTCHOICE

## 2021-05-18 NOTE — PROGRESS NOTES
Patient seen in office, scheduled, CYSTOSCOPY, GREEN LIGHT LASER VAPORIZATION OF BLADDER NECK, Wednesday 05/26/2021, [facility]/outpatient, urine culture done in office, went over pre-op instructions, all questions answered.

## 2021-05-18 NOTE — PROGRESS NOTES
Javier Rogers is a 68year old male. HPI:   Patient presents with:   Follow - Up: patient presents for f/u on urine retention has archer catheter in place       79-year-old male accompanied by his wife in follow-up to a hospital admission 2 weeks 2001        Years since quittin.3      Smokeless tobacco: Never Used    Vaping Use      Vaping Use: Never used    Alcohol use: Not Currently    Drug use: No       Medications (Active prior to today's visit):  Current Outpatient Medications   Medic Recommended:  –Keep indwelling Cristobal catheter in place. –We will add the patient to the surgical schedule for next Wednesday for cystoscopy, greenlight laser vaporization of residual bladder neck and prostatic floor tissue.   While this likely will not con

## 2021-05-18 NOTE — TELEPHONE ENCOUNTER
Dear Yassine Bernstein, this is to inform that patient is scheduled for cystoscopy, green light  xps vaporization of bladder neck. Wednesday 05/26/2021, per [facility]' request, please stop aspirin 5 days, also pre-op medical clearance prior to surgery.     I will

## 2021-05-18 NOTE — TELEPHONE ENCOUNTER
Received catheter order from [address] [place] [address] for catheter supplies it was signed by IRIS Marquez and faxed back. I will send a copy to scanning.

## 2021-05-19 NOTE — TELEPHONE ENCOUNTER
Have patient do the labs I ordered. I can probably just addend my note to write a pre op clearance as long as labs ok since I recently saw him. Do they need EKG?  If so, he will need to come in for pre op

## 2021-05-20 ENCOUNTER — TELEPHONE (OUTPATIENT)
Dept: INTERNAL MEDICINE CLINIC | Facility: CLINIC | Age: 81
End: 2021-05-20

## 2021-05-20 NOTE — TELEPHONE ENCOUNTER
Request for pre op clearance from Urology received. Per TB, pt was seen recently at [address]  Requesting pt complete ordered CBC and CMP to add to note that pt is OK for surgery. Spoke with pt's wife Pebbles Barr, per HIPAA OK, notified of above information.   Unders

## 2021-05-21 ENCOUNTER — LAB ENCOUNTER (OUTPATIENT)
Dept: LAB | Age: 81
End: 2021-05-21
Attending: INTERNAL MEDICINE
Payer: MEDICARE

## 2021-05-21 DIAGNOSIS — R60.0 LOWER EXTREMITY EDEMA: ICD-10-CM

## 2021-05-21 DIAGNOSIS — N17.9 AKI (ACUTE KIDNEY INJURY) (HCC): ICD-10-CM

## 2021-05-21 DIAGNOSIS — D72.828 OTHER ELEVATED WHITE BLOOD CELL (WBC) COUNT: ICD-10-CM

## 2021-05-21 DIAGNOSIS — D69.6 THROMBOCYTOPENIA (HCC): ICD-10-CM

## 2021-05-21 PROCEDURE — 85025 COMPLETE CBC W/AUTO DIFF WBC: CPT

## 2021-05-21 PROCEDURE — 80053 COMPREHEN METABOLIC PANEL: CPT

## 2021-05-21 PROCEDURE — 36415 COLL VENOUS BLD VENIPUNCTURE: CPT

## 2021-05-24 ENCOUNTER — LAB ENCOUNTER (OUTPATIENT)
Dept: LAB | Age: 81
End: 2021-05-24
Attending: UROLOGY
Payer: MEDICARE

## 2021-05-24 DIAGNOSIS — Z01.818 PRE-OP TESTING: ICD-10-CM

## 2021-05-26 ENCOUNTER — TELEPHONE (OUTPATIENT)
Dept: SURGERY | Facility: CLINIC | Age: 81
End: 2021-05-26

## 2021-05-26 ENCOUNTER — ANESTHESIA (OUTPATIENT)
Dept: SURGERY | Facility: HOSPITAL | Age: 81
End: 2021-05-26
Payer: MEDICARE

## 2021-05-26 ENCOUNTER — ANESTHESIA EVENT (OUTPATIENT)
Dept: SURGERY | Facility: HOSPITAL | Age: 81
End: 2021-05-26
Payer: MEDICARE

## 2021-05-26 ENCOUNTER — HOSPITAL ENCOUNTER (OUTPATIENT)
Facility: HOSPITAL | Age: 81
Setting detail: HOSPITAL OUTPATIENT SURGERY
Discharge: HOME | End: 2021-05-26
Attending: UROLOGY | Admitting: UROLOGY
Payer: MEDICARE

## 2021-05-26 VITALS
HEART RATE: 80 BPM | SYSTOLIC BLOOD PRESSURE: 130 MMHG | WEIGHT: 170 LBS | RESPIRATION RATE: 18 BRPM | TEMPERATURE: 98 F | OXYGEN SATURATION: 95 % | HEIGHT: 68 IN | BODY MASS INDEX: 25.76 KG/M2 | DIASTOLIC BLOOD PRESSURE: 56 MMHG

## 2021-05-26 DIAGNOSIS — R33.9 URINARY RETENTION: ICD-10-CM

## 2021-05-26 DIAGNOSIS — Z01.818 PRE-OP TESTING: Primary | ICD-10-CM

## 2021-05-26 DIAGNOSIS — N40.1 BENIGN PROSTATIC HYPERPLASIA WITH LOWER URINARY TRACT SYMPTOMS, SYMPTOM DETAILS UNSPECIFIED: ICD-10-CM

## 2021-05-26 PROCEDURE — 52648 LASER SURGERY OF PROSTATE: CPT | Performed by: UROLOGY

## 2021-05-26 PROCEDURE — 0T5B8ZZ DESTRUCTION OF BLADDER, VIA NATURAL OR ARTIFICIAL OPENING ENDOSCOPIC: ICD-10-PCS | Performed by: UROLOGY

## 2021-05-26 PROCEDURE — 0V508ZZ DESTRUCTION OF PROSTATE, VIA NATURAL OR ARTIFICIAL OPENING ENDOSCOPIC: ICD-10-PCS | Performed by: UROLOGY

## 2021-05-26 RX ORDER — LIDOCAINE HYDROCHLORIDE 20 MG/ML
JELLY TOPICAL AS NEEDED
Status: DISCONTINUED | OUTPATIENT
Start: 2021-05-26 | End: 2021-05-26

## 2021-05-26 RX ORDER — HYDROMORPHONE HYDROCHLORIDE 1 MG/ML
0.2 INJECTION, SOLUTION INTRAMUSCULAR; INTRAVENOUS; SUBCUTANEOUS EVERY 5 MIN PRN
Status: DISCONTINUED | OUTPATIENT
Start: 2021-05-26 | End: 2021-05-26

## 2021-05-26 RX ORDER — SODIUM CHLORIDE, SODIUM LACTATE, POTASSIUM CHLORIDE, CALCIUM CHLORIDE 600; 310; 30; 20 MG/100ML; MG/100ML; MG/100ML; MG/100ML
INJECTION, SOLUTION INTRAVENOUS CONTINUOUS
Status: DISCONTINUED | OUTPATIENT
Start: 2021-05-26 | End: 2021-05-26

## 2021-05-26 RX ORDER — PROCHLORPERAZINE EDISYLATE 5 MG/ML
5 INJECTION INTRAMUSCULAR; INTRAVENOUS ONCE AS NEEDED
Status: DISCONTINUED | OUTPATIENT
Start: 2021-05-26 | End: 2021-05-26

## 2021-05-26 RX ORDER — ACETAMINOPHEN 500 MG
1000 TABLET ORAL ONCE
Status: COMPLETED | OUTPATIENT
Start: 2021-05-26 | End: 2021-05-26

## 2021-05-26 RX ORDER — MORPHINE SULFATE 10 MG/ML
6 INJECTION, SOLUTION INTRAMUSCULAR; INTRAVENOUS EVERY 10 MIN PRN
Status: DISCONTINUED | OUTPATIENT
Start: 2021-05-26 | End: 2021-05-26

## 2021-05-26 RX ORDER — HYDROCODONE BITARTRATE AND ACETAMINOPHEN 5; 325 MG/1; MG/1
1 TABLET ORAL AS NEEDED
Status: DISCONTINUED | OUTPATIENT
Start: 2021-05-26 | End: 2021-05-26

## 2021-05-26 RX ORDER — HALOPERIDOL 5 MG/ML
0.25 INJECTION INTRAMUSCULAR ONCE AS NEEDED
Status: DISCONTINUED | OUTPATIENT
Start: 2021-05-26 | End: 2021-05-26

## 2021-05-26 RX ORDER — MORPHINE SULFATE 4 MG/ML
2 INJECTION, SOLUTION INTRAMUSCULAR; INTRAVENOUS EVERY 10 MIN PRN
Status: DISCONTINUED | OUTPATIENT
Start: 2021-05-26 | End: 2021-05-26

## 2021-05-26 RX ORDER — HYDROCODONE BITARTRATE AND ACETAMINOPHEN 5; 325 MG/1; MG/1
2 TABLET ORAL AS NEEDED
Status: DISCONTINUED | OUTPATIENT
Start: 2021-05-26 | End: 2021-05-26

## 2021-05-26 RX ORDER — MORPHINE SULFATE 4 MG/ML
4 INJECTION, SOLUTION INTRAMUSCULAR; INTRAVENOUS EVERY 10 MIN PRN
Status: DISCONTINUED | OUTPATIENT
Start: 2021-05-26 | End: 2021-05-26

## 2021-05-26 RX ORDER — LIDOCAINE HYDROCHLORIDE 10 MG/ML
INJECTION, SOLUTION EPIDURAL; INFILTRATION; INTRACAUDAL; PERINEURAL AS NEEDED
Status: DISCONTINUED | OUTPATIENT
Start: 2021-05-26 | End: 2021-05-26 | Stop reason: SURG

## 2021-05-26 RX ORDER — DEXAMETHASONE SODIUM PHOSPHATE 4 MG/ML
VIAL (ML) INJECTION AS NEEDED
Status: DISCONTINUED | OUTPATIENT
Start: 2021-05-26 | End: 2021-05-26 | Stop reason: SURG

## 2021-05-26 RX ORDER — ONDANSETRON 2 MG/ML
4 INJECTION INTRAMUSCULAR; INTRAVENOUS ONCE AS NEEDED
Status: DISCONTINUED | OUTPATIENT
Start: 2021-05-26 | End: 2021-05-26

## 2021-05-26 RX ORDER — SODIUM CHLORIDE 9 MG/ML
INJECTION, SOLUTION INTRAVENOUS CONTINUOUS
Status: DISCONTINUED | OUTPATIENT
Start: 2021-05-26 | End: 2021-05-26

## 2021-05-26 RX ORDER — HYDROMORPHONE HYDROCHLORIDE 1 MG/ML
0.6 INJECTION, SOLUTION INTRAMUSCULAR; INTRAVENOUS; SUBCUTANEOUS EVERY 5 MIN PRN
Status: DISCONTINUED | OUTPATIENT
Start: 2021-05-26 | End: 2021-05-26

## 2021-05-26 RX ORDER — HYDROMORPHONE HYDROCHLORIDE 1 MG/ML
0.4 INJECTION, SOLUTION INTRAMUSCULAR; INTRAVENOUS; SUBCUTANEOUS EVERY 5 MIN PRN
Status: DISCONTINUED | OUTPATIENT
Start: 2021-05-26 | End: 2021-05-26

## 2021-05-26 RX ORDER — ONDANSETRON 2 MG/ML
INJECTION INTRAMUSCULAR; INTRAVENOUS AS NEEDED
Status: DISCONTINUED | OUTPATIENT
Start: 2021-05-26 | End: 2021-05-26 | Stop reason: SURG

## 2021-05-26 RX ORDER — NALOXONE HYDROCHLORIDE 0.4 MG/ML
80 INJECTION, SOLUTION INTRAMUSCULAR; INTRAVENOUS; SUBCUTANEOUS AS NEEDED
Status: DISCONTINUED | OUTPATIENT
Start: 2021-05-26 | End: 2021-05-26

## 2021-05-26 RX ADMIN — SODIUM CHLORIDE: 9 INJECTION, SOLUTION INTRAVENOUS at 11:25:00

## 2021-05-26 RX ADMIN — ONDANSETRON 4 MG: 2 INJECTION INTRAMUSCULAR; INTRAVENOUS at 11:39:00

## 2021-05-26 RX ADMIN — DEXAMETHASONE SODIUM PHOSPHATE 4 MG: 4 MG/ML VIAL (ML) INJECTION at 11:39:00

## 2021-05-26 RX ADMIN — LIDOCAINE HYDROCHLORIDE 50 MG: 10 INJECTION, SOLUTION EPIDURAL; INFILTRATION; INTRACAUDAL; PERINEURAL at 11:30:00

## 2021-05-26 NOTE — ANESTHESIA POSTPROCEDURE EVALUATION
Patient: Rishabh Marquez    Procedure Summary     Date: 05/26/21 Room / Location: [address] [place] [address] MAIN OR 14 / [address] [place] [address] MAIN OR    Anesthesia Start: 4553 Anesthesia Stop: 2355    Procedure: CYSTOSCOPY, GREEN LIGHT LASER VAPORIZATION OF BLADDER NECK (N/A ) Diagnosis:

## 2021-05-26 NOTE — ANESTHESIA PREPROCEDURE EVALUATION
Anesthesia PreOp Note    HPI:     Autumn Rivera is a 68year old male who presents for preoperative consultation requested by:  Derrick Gusman MD    Date of Surgery: 5/26/2021    Procedure(s):  CYSTOSCOPY, GREEN LIGHT LASER VAPORIZATION OF BLADDER daily., Disp: , Rfl: , 5/25/2021 at Unknown time  simvastatin 40 MG Oral Tab, Take 40 mg by mouth nightly., Disp: , Rfl: , 5/25/2021 at Unknown time  omeprazole 20 MG Oral Capsule Delayed Release, Take 40 mg by mouth 2 (two) times a day., Disp: , Rfl: , 5/ current Epic-ordered outpatient medications on file.       No Known Allergies    Family History   Problem Relation Age of Onset   • Heart Attack Father    • Heart Attack Mother      Social History    Socioeconomic History      Marital status:       S Attends Club or Organization Meetings:       Marital Status:   Intimate Partner Violence:       Fear of Current or Ex-Partner:       Emotionally Abused:       Physically Abused:       Sexually Abused:     Available pre-op labs reviewed.   Lab Results   Comp the anesthetic plan, major risks and alternatives with the patient and answered all questions. The patient desires to proceed with surgery and anesthesia as planned.    Informed Consent Plan and Risks Discussed With:  Patient and child/children      I have

## 2021-05-26 NOTE — TELEPHONE ENCOUNTER
Spoke with patients spouse. Assisted in scheduling nurse visit. Spouse confirmed and verbalized understanding.      Future Appointments   Date Time Provider Sadia Webb   5/28/2021  8:40  [facility]   8/26/2021  8:30 AM Santiago

## 2021-05-26 NOTE — TELEPHONE ENCOUNTER
Urology staff,  This patient needs to be seen by nursing staff for a nurse visit this Friday to remove his Cristobal catheter. Please note he has 30 cc in the balloon.   The patient will also need to be retaught clean catheterization to confirm that the cathet

## 2021-05-26 NOTE — H&P
42-year-old male accompanied by his wife in follow-up to a hospital admission 2 weeks ago for urosepsis. The patient has an extensive past urologic history.   He has a history of chronic BPH had cyst[facility]    Drug use: No         Medications (Active prior to today's visit):         Current Outpatient Medications   Medication Sig Dispense Refill   • cephALEXin 250 MG Oral Cap Take 1 capsule (250 mg total) by mouth nightly.  14 capsule 0   • metFORMIN found.     Reviewed findings extensively with patient and wife. Recommended:  –Keep indwelling Cristobal catheter in place.   –We will add the patient to the surgical schedule for next Wednesday for cystoscopy, greenlight laser vaporization of residual bladder

## 2021-05-26 NOTE — OPERATIVE REPORT
[facility]    Operative Note     Miri Madison Location: OR   CSN [id number] MRN [id number]   Admission Date 5/26/2021 Operation Date 5/26/2021   Attending Physician Rik Hamlin MD Operating Physician Jose Eduardo López MD      Preop nice open channel now into the prostate. The ureteral orifice ease were intact and spared. The scope was removed. An 25 Malaysian two-way Cristobal catheter with a 30 cc balloon was placed.   The patient was placed in the supine position awakened and taken out

## 2021-05-26 NOTE — ANESTHESIA PROCEDURE NOTES
Airway  Urgency: elective      General Information and Staff    Patient location during procedure: OR  Anesthesiologist: Georges Patel MD  Performed: anesthesiologist     Indications and Patient Condition  Indications for airway management: anesthe

## 2021-05-26 NOTE — INTERVAL H&P NOTE
Pre-op Diagnosis: Urinary retention [R33.9]  Benign prostatic hyperplasia with lower urinary tract symptoms, symptom details unspecified [N40.1]    The above referenced H&P was reviewed by Kathee Meigs, MD on 5/26/2021, the patient was examined and no sig

## 2021-05-28 ENCOUNTER — NURSE ONLY (OUTPATIENT)
Dept: SURGERY | Facility: CLINIC | Age: 81
End: 2021-05-28
Payer: MEDICARE

## 2021-05-28 DIAGNOSIS — N30.01 ACUTE CYSTITIS WITH HEMATURIA: ICD-10-CM

## 2021-05-28 NOTE — PROGRESS NOTES
Pt here with wife, Bebo Fearing, for archer catheter removal following Cysto/ Justa Meckel; and re-instruct CIC. Pt positioned self on exam table; privacy provided. Procedure explained to pt & wife.  I disconnected archer from stat-lock; aspirated 30 ml prime from c

## 2021-06-14 RX ORDER — OMEPRAZOLE 40 MG/1
CAPSULE, DELAYED RELEASE ORAL
Qty: 180 CAPSULE | Refills: 0 | Status: SHIPPED | OUTPATIENT
Start: 2021-06-14 | End: 2021-09-16

## 2021-06-14 RX ORDER — AMLODIPINE BESYLATE 10 MG/1
TABLET ORAL
Qty: 90 TABLET | Refills: 0 | Status: SHIPPED | OUTPATIENT
Start: 2021-06-14 | End: 2021-09-16

## 2021-06-14 NOTE — TELEPHONE ENCOUNTER
Last Ov: 5/13/21, TB, TCM  Last labs: CBC, CMP 5/21/21  Last Rx: omeprazole 20mg entered historically    Future Appointments   Date Time Provider Sadia Webb   8/26/2021  8:30 AM Funmilayo Stevens MD [facility]       Per Protocol - omeprazole not o

## 2021-06-21 ENCOUNTER — TELEPHONE (OUTPATIENT)
Dept: INTERNAL MEDICINE CLINIC | Facility: CLINIC | Age: 81
End: 2021-06-21

## 2021-06-21 NOTE — TELEPHONE ENCOUNTER
Pt called and stated that he is concerned about a few issues. 1) he has been having some leg swelling    2) he has been noticing every other time he has a BM he can see a tiny amount of blood on the toilet paper    3) his arthritis has been bothering him. He had a friend that gave him an RX of Diclofenac Sodium 75mg that he has been taking 2xs a day.  He states its helping his arthritis but maybe causing the blood in the stool     Please advise

## 2021-06-24 NOTE — TELEPHONE ENCOUNTER
Pt aware will need to schedule visit. Okay seeing another provider, as TB is out of office. Pt reports scant to mild bilat feet swelling for 3-4 weeks. States his wife is monitoring, he doesn't really notice. Denies pain, redness, wounds, fever, cp, or sob. States has \"very rare\" scant blood on TP after BM. Thinks he had hemorrhoids a long time ago. Noticed this started 3-4 weeks ago. Denies abd pain, rectal pain, n/v, or other signs of bleeding. Also reports his ankle/lower leg arthritis has been acting up. Usually takes tylenol/gabapentin. Denies redness, swelling, injury, or fever. A friend gave him diclofenac, he has been taking for 3-4 weeks twice daily. Advised to hold until speaks with provider tomorrow.      Future Appointments   Date Time Provider Sadia Webb   6/25/2021  3:00 PM Nathanael Celaya MD [facility]   8/26/2021  8:30 AM Hellen Carson MD [facility]

## 2021-06-25 ENCOUNTER — OFFICE VISIT (OUTPATIENT)
Dept: INTERNAL MEDICINE CLINIC | Facility: CLINIC | Age: 81
End: 2021-06-25
Payer: MEDICARE

## 2021-06-25 VITALS
BODY MASS INDEX: 26.65 KG/M2 | DIASTOLIC BLOOD PRESSURE: 86 MMHG | HEIGHT: 68 IN | SYSTOLIC BLOOD PRESSURE: 142 MMHG | RESPIRATION RATE: 18 BRPM | WEIGHT: 175.81 LBS

## 2021-06-25 DIAGNOSIS — K92.1 BLOOD IN STOOL: Primary | ICD-10-CM

## 2021-06-25 DIAGNOSIS — R60.0 BILATERAL LOWER EXTREMITY EDEMA: ICD-10-CM

## 2021-06-25 DIAGNOSIS — M25.571 CHRONIC PAIN OF RIGHT ANKLE: ICD-10-CM

## 2021-06-25 DIAGNOSIS — R19.8 STRAINING WITH STOOLS: ICD-10-CM

## 2021-06-25 DIAGNOSIS — M54.16 LUMBAR RADICULOPATHY: ICD-10-CM

## 2021-06-25 DIAGNOSIS — G89.29 CHRONIC PAIN OF RIGHT ANKLE: ICD-10-CM

## 2021-06-25 PROCEDURE — 99214 OFFICE O/P EST MOD 30 MIN: CPT | Performed by: FAMILY MEDICINE

## 2021-06-25 RX ORDER — ASPIRIN 81 MG
100 TABLET, DELAYED RELEASE (ENTERIC COATED) ORAL 2 TIMES DAILY
Qty: 60 TABLET | Refills: 0 | Status: SHIPPED | OUTPATIENT
Start: 2021-06-25

## 2021-06-25 NOTE — PROGRESS NOTES
Vaibhav Andino  8/31/1940    Patient presents with:  Arthritis: MR rm 1 arthritis flare  Other: periodic blood when wiping afrer bowel movements       HPI:   Vaibhav Andino is a 68year old male who presents for evaluation of a few issues.  He mouth daily. 90 tablet 3   • hydrocortisone 2.5 % External Ointment Apply to AAs BID for 2 weeks, then hold 2 weeks, repeat PRN. 454 g 2   • acetaminophen 500 MG Oral Tab Take 1,000 mg by mouth 3 (three) times daily.        • Vitamin D3 2000 units Oral Cap Family History   Problem Relation Age of Onset   • Heart Attack Father    • Heart Attack Mother       Social History    Tobacco Use      Smoking status: Former Smoker        Quit date: 2001        Years since quittin.4      Smokeless tobacco: significant abdominal symptoms, though he does have complaint of straining with BMs.   No obvious fissure, internal, or external hemorrhoids on exam.  Benign abdominal exam.  Has recently been using his wife's diclofenac twice daily for the last 3 weeks debi plan.     Thank you,  Hailey Parisi MD

## 2021-06-26 ENCOUNTER — HOSPITAL ENCOUNTER (OUTPATIENT)
Dept: GENERAL RADIOLOGY | Age: 81
Discharge: HOME | End: 2021-06-26
Attending: FAMILY MEDICINE
Payer: MEDICARE

## 2021-06-26 DIAGNOSIS — M25.571 CHRONIC PAIN OF RIGHT ANKLE: ICD-10-CM

## 2021-06-26 DIAGNOSIS — G89.29 CHRONIC PAIN OF RIGHT ANKLE: ICD-10-CM

## 2021-06-26 PROCEDURE — 73610 X-RAY EXAM OF ANKLE: CPT | Performed by: FAMILY MEDICINE

## 2021-06-29 ENCOUNTER — LAB ENCOUNTER (OUTPATIENT)
Dept: LAB | Age: 81
End: 2021-06-29
Attending: FAMILY MEDICINE
Payer: MEDICARE

## 2021-06-29 DIAGNOSIS — R60.0 BILATERAL LOWER EXTREMITY EDEMA: ICD-10-CM

## 2021-06-29 DIAGNOSIS — K92.1 BLOOD IN STOOL: ICD-10-CM

## 2021-06-29 LAB
ALBUMIN SERPL-MCNC: 3.8 G/DL (ref 3.4–5)
ALBUMIN/GLOB SERPL: 1.1 {RATIO} (ref 1–2)
ALP LIVER SERPL-CCNC: 70 U/L
ALT SERPL-CCNC: 26 U/L
ANION GAP SERPL CALC-SCNC: 6 MMOL/L (ref 0–18)
AST SERPL-CCNC: 17 U/L (ref 15–37)
BASOPHILS # BLD AUTO: 0.09 X10(3) UL (ref 0–0.2)
BASOPHILS NFR BLD AUTO: 0.9 %
BILIRUB SERPL-MCNC: 0.5 MG/DL (ref 0.1–2)
BUN BLD-MCNC: 37 MG/DL (ref 7–18)
BUN/CREAT SERPL: 31.9 (ref 10–20)
CALCIUM BLD-MCNC: 9.6 MG/DL (ref 8.5–10.1)
CHLORIDE SERPL-SCNC: 110 MMOL/L (ref 98–112)
CO2 SERPL-SCNC: 25 MMOL/L (ref 21–32)
CREAT BLD-MCNC: 1.16 MG/DL
DEPRECATED RDW RBC AUTO: 35.8 FL (ref 35.1–46.3)
EOSINOPHIL # BLD AUTO: 0.73 X10(3) UL (ref 0–0.7)
EOSINOPHIL NFR BLD AUTO: 7.4 %
ERYTHROCYTE [DISTWIDTH] IN BLOOD BY AUTOMATED COUNT: 15.7 % (ref 11–15)
GLOBULIN PLAS-MCNC: 3.6 G/DL (ref 2.8–4.4)
GLUCOSE BLD-MCNC: 103 MG/DL (ref 70–99)
HCT VFR BLD AUTO: 32.2 %
HGB BLD-MCNC: 9.9 G/DL
IMM GRANULOCYTES # BLD AUTO: 0.04 X10(3) UL (ref 0–1)
IMM GRANULOCYTES NFR BLD: 0.4 %
LYMPHOCYTES # BLD AUTO: 2.13 X10(3) UL (ref 1–4)
LYMPHOCYTES NFR BLD AUTO: 21.5 %
M PROTEIN MFR SERPL ELPH: 7.4 G/DL (ref 6.4–8.2)
MCH RBC QN AUTO: 20.3 PG (ref 26–34)
MCHC RBC AUTO-ENTMCNC: 30.7 G/DL (ref 31–37)
MCV RBC AUTO: 66.1 FL
MONOCYTES # BLD AUTO: 0.85 X10(3) UL (ref 0.1–1)
MONOCYTES NFR BLD AUTO: 8.6 %
NEUTROPHILS # BLD AUTO: 6.06 X10 (3) UL (ref 1.5–7.7)
NEUTROPHILS # BLD AUTO: 6.06 X10(3) UL (ref 1.5–7.7)
NEUTROPHILS NFR BLD AUTO: 61.2 %
OSMOLALITY SERPL CALC.SUM OF ELEC: 301 MOSM/KG (ref 275–295)
PATIENT FASTING Y/N/NP: NO
PLATELET # BLD AUTO: 291 10(3)UL (ref 150–450)
POTASSIUM SERPL-SCNC: 3.8 MMOL/L (ref 3.5–5.1)
RBC # BLD AUTO: 4.87 X10(6)UL
SODIUM SERPL-SCNC: 141 MMOL/L (ref 136–145)
WBC # BLD AUTO: 9.9 X10(3) UL (ref 4–11)

## 2021-06-29 PROCEDURE — 85025 COMPLETE CBC W/AUTO DIFF WBC: CPT

## 2021-06-29 PROCEDURE — 80053 COMPREHEN METABOLIC PANEL: CPT

## 2021-06-29 PROCEDURE — 36415 COLL VENOUS BLD VENIPUNCTURE: CPT

## 2021-07-17 ENCOUNTER — OFFICE VISIT (OUTPATIENT)
Dept: INTERNAL MEDICINE CLINIC | Facility: CLINIC | Age: 81
End: 2021-07-17
Payer: MEDICARE

## 2021-07-17 VITALS
DIASTOLIC BLOOD PRESSURE: 80 MMHG | WEIGHT: 175 LBS | RESPIRATION RATE: 16 BRPM | SYSTOLIC BLOOD PRESSURE: 138 MMHG | HEART RATE: 64 BPM | BODY MASS INDEX: 26.52 KG/M2 | HEIGHT: 68 IN | TEMPERATURE: 98 F

## 2021-07-17 DIAGNOSIS — D64.9 CHRONIC ANEMIA: ICD-10-CM

## 2021-07-17 DIAGNOSIS — K92.1 BLOOD IN STOOL: Primary | ICD-10-CM

## 2021-07-17 PROCEDURE — 99213 OFFICE O/P EST LOW 20 MIN: CPT | Performed by: INTERNAL MEDICINE

## 2021-07-17 NOTE — PROGRESS NOTES
Autumn Rivera is a 68year old male. Patient presents with: Follow - Up: SN Rm 4; blood in stool resolved, GI recommends colonoscopy      HPI:     Patient here for f/u. He saw [address] end of last month for blood in stool.  He says it was blood on the p finasteride 5 MG Oral Tab Take 1 tablet (5 mg total) by mouth daily. 90 tablet 3   • hydrocortisone 2.5 % External Ointment Apply to AAs BID for 2 weeks, then hold 2 weeks, repeat PRN.  454 g 2   • acetaminophen 500 MG Oral Tab Take 1,000 mg by mouth 3 (thr auscultation  CARDIO: RRR nl S1 S2  GI: normal bowel sounds, soft, NT/ND  EXTREMITIES: trace chronic edema of ankles  NEURO: Alert and oriented    ASSESSMENT AND PLAN:   Blood in stool  (primary encounter diagnosis)- resolved, stressed to get cscope done a

## 2021-08-02 ENCOUNTER — HOSPITAL ENCOUNTER (OUTPATIENT)
Age: 81
Discharge: HOME | End: 2021-08-02
Payer: MEDICARE

## 2021-08-02 ENCOUNTER — APPOINTMENT (OUTPATIENT)
Dept: CT IMAGING | Age: 81
End: 2021-08-02
Attending: NURSE PRACTITIONER
Payer: MEDICARE

## 2021-08-02 VITALS
TEMPERATURE: 98 F | DIASTOLIC BLOOD PRESSURE: 85 MMHG | HEART RATE: 77 BPM | SYSTOLIC BLOOD PRESSURE: 169 MMHG | RESPIRATION RATE: 16 BRPM | OXYGEN SATURATION: 98 %

## 2021-08-02 DIAGNOSIS — A09 TRAVELER'S DIARRHEA: ICD-10-CM

## 2021-08-02 DIAGNOSIS — N30.01 ACUTE CYSTITIS WITH HEMATURIA: Primary | ICD-10-CM

## 2021-08-02 LAB
#MXD IC: 0.9 X10ˆ3/UL (ref 0.1–1)
CREAT BLD-MCNC: 1.6 MG/DL
GLUCOSE BLD-MCNC: 99 MG/DL (ref 70–99)
HCT VFR BLD AUTO: 32.1 %
HGB BLD-MCNC: 9.8 G/DL
ISTAT BUN: 45 MG/DL (ref 7–18)
ISTAT CHLORIDE: 110 MMOL/L (ref 98–112)
ISTAT HEMATOCRIT: 29 %
ISTAT IONIZED CALCIUM FOR CHEM 8: 1.18 MMOL/L (ref 1.12–1.32)
ISTAT POTASSIUM: 4.4 MMOL/L (ref 3.6–5.1)
ISTAT SODIUM: 143 MMOL/L (ref 136–145)
ISTAT TCO2: 21 MMOL/L (ref 21–32)
LYMPHOCYTES # BLD AUTO: 1.7 X10ˆ3/UL (ref 1–4)
LYMPHOCYTES NFR BLD AUTO: 24.8 %
MCH RBC QN AUTO: 20.2 PG (ref 26–34)
MCHC RBC AUTO-ENTMCNC: 30.5 G/DL (ref 31–37)
MCV RBC AUTO: 66.3 FL (ref 80–100)
MIXED CELL %: 13.3 %
NEUTROPHILS # BLD AUTO: 4.4 X10ˆ3/UL (ref 1.5–7.7)
NEUTROPHILS NFR BLD AUTO: 61.9 %
PLATELET # BLD AUTO: 236 X10ˆ3/UL (ref 150–450)
POCT BILIRUBIN URINE: NEGATIVE
POCT GLUCOSE URINE: NEGATIVE MG/DL
POCT KETONE URINE: NEGATIVE MG/DL
POCT NITRITE URINE: NEGATIVE
POCT PH URINE: 5 (ref 5–8)
POCT SPECIFIC GRAVITY URINE: 1
POCT URINE COLOR: YELLOW
POCT UROBILINOGEN URINE: 0.2 MG/DL
RBC # BLD AUTO: 4.84 X10ˆ6/UL
WBC # BLD AUTO: 7 X10ˆ3/UL (ref 4–11)

## 2021-08-02 PROCEDURE — 99214 OFFICE O/P EST MOD 30 MIN: CPT | Performed by: NURSE PRACTITIONER

## 2021-08-02 PROCEDURE — 85025 COMPLETE CBC W/AUTO DIFF WBC: CPT | Performed by: NURSE PRACTITIONER

## 2021-08-02 PROCEDURE — 81002 URINALYSIS NONAUTO W/O SCOPE: CPT | Performed by: NURSE PRACTITIONER

## 2021-08-02 PROCEDURE — 80047 BASIC METABLC PNL IONIZED CA: CPT | Performed by: NURSE PRACTITIONER

## 2021-08-02 PROCEDURE — 74177 CT ABD & PELVIS W/CONTRAST: CPT | Performed by: NURSE PRACTITIONER

## 2021-08-02 RX ORDER — DICYCLOMINE HCL 20 MG
20 TABLET ORAL 4 TIMES DAILY PRN
Qty: 20 TABLET | Refills: 0 | Status: SHIPPED | OUTPATIENT
Start: 2021-08-02 | End: 2021-09-01

## 2021-08-02 RX ORDER — SODIUM CHLORIDE 9 MG/ML
500 INJECTION, SOLUTION INTRAVENOUS ONCE
Status: COMPLETED | OUTPATIENT
Start: 2021-08-02 | End: 2021-08-02

## 2021-08-02 RX ORDER — CEPHALEXIN 500 MG/1
500 CAPSULE ORAL 2 TIMES DAILY
Qty: 14 CAPSULE | Refills: 0 | Status: SHIPPED | OUTPATIENT
Start: 2021-08-02 | End: 2021-08-09

## 2021-08-02 NOTE — ED PROVIDER NOTES
Patient Seen in: Immediate [address] [place] [address]      History   Patient presents with:  Diarrhea    Stated Complaint: diarrhea    HPI/Subjective:   19-year-old male presents today with complaints of diarrhea over the last 4 days.   States recently came back from [facility]     ED Triage Vitals [08/02/21 0843]   BP (!) 169/85   Pulse 77   Resp 16   Temp 97.6 °F (36.4 °C)   Temp src Temporal   SpO2 98 %   O2 Device        Current:BP (!) 169/85   Pulse 77   Temp 97.6 °F (36.4 °C) (Temporal)   Resp 16   SpO2 98%         Phy EDWARD , CT, CT ABDOMEN+PELVIS KIDNEYSTONE 2D RNDR(NO IV,NO ORAL)(CPT=74176), 5/05/2021, 11:26 AM.  INDICATIONS:  diarrhea  TECHNIQUE:  CT scanning was performed from the dome of the diaphragm to the pubic symphysis with non-ionic intravenous contrast mate NODES:  No adenopathy. PELVIC ORGANS:  Pelvic organs appropriate for patient age. BONES:  Stable including degenerative change. No bony lesion or fracture. LUNG BASES:  No visible pulmonary or pleural disease. CONCLUSION:  1.  Diverticular d 1 week  As needed          Medications Prescribed:  Current Discharge Medication List    START taking these medications    cephalexin 500 MG Oral Cap  Take 1 capsule (500 mg total) by mouth 2 (two) times daily for 7 days.   Qty: 14 capsule Refills: 0    dic

## 2021-08-02 NOTE — ED INITIAL ASSESSMENT (HPI)
Pt c/o diarrhea since Thursday. No blood or mucous in stool. Pt returned from [place] on Friday. No fever, nausea, or vomiting. Diarrhea is intermittent. bm x 3 so far today.

## 2021-08-12 RX ORDER — GABAPENTIN 300 MG/1
CAPSULE ORAL
Qty: 270 CAPSULE | Refills: 0 | Status: SHIPPED | OUTPATIENT
Start: 2021-08-12 | End: 2021-11-19

## 2021-08-12 RX ORDER — SIMVASTATIN 40 MG
TABLET ORAL
Qty: 90 TABLET | Refills: 0 | Status: SHIPPED | OUTPATIENT
Start: 2021-08-12 | End: 2021-11-19

## 2021-08-12 NOTE — TELEPHONE ENCOUNTER
Last VISIT 7/17/21 TB Blood in stool    Last REFILL 2/17/21 Gabapentin 270C 1R,,  Simvastatin 90T 1R    Last LABS 6/29/21 CBC, CMP    Future Appointments   Date Time Provider Sadia Webb   8/26/2021  8:30 AM Anila Issa MD [facility]   9/13/2

## 2021-08-18 RX ORDER — LISINOPRIL 40 MG/1
TABLET ORAL
Qty: 90 TABLET | Refills: 0 | Status: SHIPPED | OUTPATIENT
Start: 2021-08-18 | End: 2021-11-19

## 2021-08-26 ENCOUNTER — OFFICE VISIT (OUTPATIENT)
Dept: SURGERY | Facility: CLINIC | Age: 81
End: 2021-08-26
Payer: MEDICARE

## 2021-08-26 DIAGNOSIS — R33.9 URINARY RETENTION: Primary | ICD-10-CM

## 2021-08-26 DIAGNOSIS — N40.1 BENIGN PROSTATIC HYPERPLASIA WITH LOWER URINARY TRACT SYMPTOMS, SYMPTOM DETAILS UNSPECIFIED: ICD-10-CM

## 2021-08-26 PROCEDURE — 99213 OFFICE O/P EST LOW 20 MIN: CPT | Performed by: UROLOGY

## 2021-08-26 NOTE — PROGRESS NOTES
Dontae Pandya is a 68year old male. HPI:   Patient presents with:   Follow - Up: CIC 3x daily; sometimes difficulty initiating; recently completed ABT      29-year-old male status post cystoscopy, greenlight laser vaporization of the prostate es Use: Never used    Alcohol use: Not Currently    Drug use: No       Medications (Active prior to today's visit):  Current Outpatient Medications   Medication Sig Dispense Refill   • LISINOPRIL 40 MG Oral Tab TAKE 1 TABLET BY MOUTH EVERY DAY 90 tablet 0   • symptoms, symptom details unspecified    Recommend:  –Continue with clean intermittent catheterization 3 times daily. –Follow-up otherwise in 6 months. Orders This Visit:  No orders of the defined types were placed in this encounter.       Meds Thi

## 2021-09-16 RX ORDER — OMEPRAZOLE 40 MG/1
CAPSULE, DELAYED RELEASE ORAL
Qty: 180 CAPSULE | Refills: 0 | Status: SHIPPED | OUTPATIENT
Start: 2021-09-16 | End: 2021-12-13

## 2021-09-16 RX ORDER — AMLODIPINE BESYLATE 10 MG/1
TABLET ORAL
Qty: 90 TABLET | Refills: 1 | Status: SHIPPED | OUTPATIENT
Start: 2021-09-16 | End: 2021-12-16

## 2021-09-16 RX ORDER — HYDROCHLOROTHIAZIDE 25 MG/1
TABLET ORAL
Qty: 90 TABLET | Refills: 1 | Status: SHIPPED | OUTPATIENT
Start: 2021-09-16 | End: 2021-12-16

## 2021-09-16 NOTE — TELEPHONE ENCOUNTER
Been Following TB  Last OV 7/17/21  Last CPE 1/25/21  Last Labs CMP, CBC 6/29/21    Last Rx fill Omeprazole 40mg #180 0R 6/14/21    Future Appointments   Date Time Provider Sadia Webb   3/3/2022  8:30 AM Joanne Power [facility]       Per P

## 2021-10-22 RX ORDER — METFORMIN HYDROCHLORIDE 750 MG/1
TABLET, EXTENDED RELEASE ORAL
Qty: 180 TABLET | Refills: 0 | Status: SHIPPED | OUTPATIENT
Start: 2021-10-22 | End: 2022-01-25

## 2021-10-22 NOTE — TELEPHONE ENCOUNTER
Been Following  TB  Last OV  7/17/21  Last CPE  1/25/21  Last Labs  CBC, CMP 6/29/21    Last Rx fill    Medication Quantity Refills Start End   metFORMIN HCl  MG Oral Tablet 24 Hr 90 tablet 0 5/13/2021        Future Appointments   Date Time Provider

## 2021-10-25 ENCOUNTER — TELEPHONE (OUTPATIENT)
Dept: SURGERY | Facility: CLINIC | Age: 81
End: 2021-10-25

## 2021-10-25 NOTE — TELEPHONE ENCOUNTER
I called the pt. Per pt he was directed by JOAN to do CIC TID. As of one month ago he has been unable to cath himself three times daily 20-30% of the time.  He was unable to cath himself today when he woke up, but did succeed at 11:00am. Per pt he did not ha

## 2021-11-09 NOTE — TELEPHONE ENCOUNTER
S/W pt's spouse and informed her of [facility]'s msg response as stated below and spouse accepted an appt for pt and will call back if he cannot accept it, for Fri 11/26 at 11 am. I explained the procedure in detail and the prep instructions also and told her fariha

## 2021-11-11 NOTE — TELEPHONE ENCOUNTER
Chief Complaint   Patient presents with   • Annual Exam     physical       HPI:  Gilmar Masterson, -1953, is a 68 y.o. male who presents for an annual physical.  Follow-up for cholesterol.  Currently, has been feeling well without any myalgias, muscle aches, weakness, numbness, chest pain, short of breath or other issues.  Currently, is adherent with medication regimen of rosuvastatin 5 mg and denies medication side effects. Is due for lab follow-up.    History of insomnia and doing well with the trazodone 25 mg daily.    Last colonoscopy over 5 years ago and is due for repeat with Dr Ballard secondary to personal history of polyps and colon resection.    Concern of memory issues with forgetting things, family member names.    Recent Hospitalizations:  No hospitalization(s) within the last year..    Current Medical Providers:  Patient Care Team:  Curt Garza MD as PCP - General (Internal Medicine)  Paul Mendoza MD (Dermatology)  Maximo Palmer MD as Surgeon (General Surgery)  Carroll Kingston MD as Surgeon (Neurosurgery)    Compared to one year ago, the patient feels his physical health is the same and his mental health is the same.    Depression Screen:  PHQ-2/PHQ-9 Depression Screening 2021   Little interest or pleasure in doing things 0   Feeling down, depressed, or hopeless 0   Total Score 0       Past Medical/Family/Social History:  The following portions of the patient's history were reviewed and updated as appropriate: allergies, current medications, past family history, past medical history, past social history, past surgical history and problem list.    No Known Allergies      Current Outpatient Medications:   •  esomeprazole (NexIUM) 40 MG capsule, Take 40 mg by mouth every morning before breakfast., Disp: , Rfl:   •  rosuvastatin (CRESTOR) 5 MG tablet, TAKE 1 TABLET BY MOUTH DAILY, Disp: 90 tablet, Rfl: 1  •  traZODone (DESYREL) 50 MG tablet, Take 1 tablet by mouth Every  Last Ov:1/25/21  Upcoming appt:none  Last labs:1/28/21 psa, cbc, cmp, lipid, tsh, a1c  Last Rx:8/25/20 gabapentin     Per Protocol sent for review Night., Disp: 90 tablet, Rfl: 1    Current medication list contains no high risk medications.  No harmful drug interactions have been identified.     Family History   Problem Relation Age of Onset   • Arthritis Mother         Chronic   • Alzheimer's disease Mother    • Dementia Father        Social History     Tobacco Use   • Smoking status: Never Smoker   • Smokeless tobacco: Never Used   Substance Use Topics   • Alcohol use: No       Past Surgical History:   Procedure Laterality Date   • APPENDECTOMY N/A 2008   • CHOLECYSTECTOMY N/A 2008   • COLECTOMY PARTIAL / TOTAL Right 12/16/2008    Open sigmoid colectomy with takedown of splenic flexure, open right hemicolectomy, cholecystectomy-Dr. Maximo Acevedo   • COLON SURGERY     • COLONOSCOPY N/A 02/10/2016    Normal colonoscopy-Dr. Maximo Palmer   • COLONOSCOPY W/ POLYPECTOMY N/A 12/20/2001    Polyp in the cecum, hemorrhoidal irritation, diverticulosis-Dr. Uriel Cardenas   • COLONOSCOPY W/ POLYPECTOMY N/A 12/07/2012    Abnormal. Polyp   • HERNIA REPAIR N/A 1997   • NEPHRECTOMY Left 1997    Dr. 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   HENT: Negative for nosebleeds, rhinorrhea, trouble swallowing and voice change.    Eyes: Negative for visual disturbance.   Respiratory: Negative for cough, chest tightness, shortness of breath and wheezing.    Cardiovascular: Negative for chest pain, palpitations and leg swelling.   Gastrointestinal: Negative for abdominal pain, blood in stool, constipation, diarrhea, nausea, vomiting, GERD and  "indigestion.   Genitourinary: Negative for dysuria, frequency and hematuria.   Musculoskeletal: Negative for arthralgias, back pain and myalgias.   Skin: Negative for rash and bruise.   Neurological: Negative for dizziness, tremors, weakness, light-headedness, numbness, headache and memory problem.   Hematological: Negative for adenopathy. Does not bruise/bleed easily.   Psychiatric/Behavioral: Negative for sleep disturbance and depressed mood. The patient is not nervous/anxious.        Objective     Vitals:    11/11/21 0903   BP: 138/84   Pulse: 57   Temp: 97.5 °F (36.4 °C)   SpO2: 95%   Weight: 89.1 kg (196 lb 8 oz)   Height: 176.5 cm (69.49\")   PainSc: 0-No pain       Patient's Body mass index is 28.61 kg/m². indicating that he is overweight (BMI 25-29.9). Obesity-related health conditions include the following: dyslipidemias and osteoarthritis. Obesity is unchanged. BMI is is above average; BMI management plan is completed. We discussed portion control and increasing exercise..      No exam data present    Physical Exam  Vitals and nursing note reviewed.   Constitutional:       General: He is not in acute distress.     Appearance: He is well-developed. He is not diaphoretic.   HENT:      Head: Normocephalic and atraumatic.      Right Ear: External ear normal.      Left Ear: External ear normal.      Nose: Nose normal.   Eyes:      Conjunctiva/sclera: Conjunctivae normal.      Pupils: Pupils are equal, round, and reactive to light.   Neck:      Thyroid: No thyromegaly.      Trachea: No tracheal deviation.   Cardiovascular:      Rate and Rhythm: Normal rate and regular rhythm.      Heart sounds: Normal heart sounds. No murmur heard.  No friction rub. No gallop.    Pulmonary:      Effort: Pulmonary effort is normal. No respiratory distress.      Breath sounds: Normal breath sounds.   Abdominal:      General: Bowel sounds are normal.      Palpations: Abdomen is soft. There is no mass.      Tenderness: There is no " "abdominal tenderness. There is no guarding.   Musculoskeletal:         General: Normal range of motion.      Cervical back: Normal range of motion and neck supple.   Lymphadenopathy:      Cervical: No cervical adenopathy.   Skin:     General: Skin is warm and dry.      Capillary Refill: Capillary refill takes less than 2 seconds.      Findings: No rash.   Neurological:      Mental Status: He is alert and oriented to person, place, and time.      Motor: No abnormal muscle tone.      Deep Tendon Reflexes: Reflexes normal.   Psychiatric:         Behavior: Behavior normal.         Thought Content: Thought content normal.         Judgment: Judgment normal.         Recent Lab Results:     Lab Results   Component Value Date    CHOL 126 07/31/2019    TRIG 156 (H) 05/03/2021    HDL 50 05/03/2021    VLDL 27 05/03/2021    LDLHDL 1.54 07/31/2019     MMSE  Orientation   What is the year? Season? Date? Day of the Week? Month? (5 points)score 5    Where are we now: State? Country? Town/city? Hospital? Floor? (5 points)score5    Registration  The examiner names 3 unrelated objects clearly and slowly, then asked the patient to name all 3 of them.  The patient's response is used for scoring. Examiner repeats them until patient learns all of them if possible.(3 points)score3    Attention and Calculation(5 points)  \"I would like you to count backward from 100 by sevens\".(93, 86,79,72,65)  Stop after 5 answers.  Alternative: \"Spell WORLD backwards\"(D-L-R-O-W)(5 points)score5    Recall   \"Earlier I told you the names of 3 things can you remember what those were?\"(3 points)score3    Language and Praxis  Naming  Show the patient to simple objects, symptoms such as a watc2h and a pencil, and asked the patient to name them.(2 points)score2    Repetition  \"Repeat the phrase: \"No if's, and's, or bu1t's.\"\"(1point)score1    3 stage command  \"Take the paper in your right hand, folded in half, and put it on the floor.\"  (The examiner gives the " "patient a piece of blank paper)(3points)score3    Reading  \"Please read this and do what it says.\"  (Written instruction is \"close your eyes.\")(1point)score1    Writing  \"Make up and write a sentence about anything.\"  (This sentence must contain a noun and a verb.)(1 point)score1    Copying  \"Please copy this picture\" (the examiner gives the patient a blank is a paper and ask him or her to draw the symbol of 2 intersecting pentagons.  All 10 angles must be present and to must intersect.)(1 point)score1    Total Score 30 out of possible 30    Interpretation :  Cutoff :<24 Abnormal  Range: <21 increased odds of dementia; >25 decreased odds of dementia  Education: <21 abnormal for 8th grade education;<23 abnormal high school education;<24  abnormal for college education  Severity: 24-30 no cognitive impairment;18-23 mild cognitive impairment; 0-17 severe cognitive              impairment    Assessment/Plan   Age-appropriate Screening Schedule:  Refer to the list below for future screening recommendations based on patient's age, sex and/or medical conditions.      Health Maintenance   Topic Date Due   • LIPID PANEL  05/03/2022   • COLONOSCOPY  02/10/2026   • TDAP/TD VACCINES (2 - Td or Tdap) 03/19/2029   • INFLUENZA VACCINE  Completed   • ZOSTER VACCINE  Completed       Diagnoses and all orders for this visit:    1. Encounter for annual physical exam (Primary)  -     Comprehensive Metabolic Panel  -     Lipid Panel    2. Insomnia, unspecified type  Comments:  discussed sleep hygiene (no tv, no clock,dark curtains, routine exercise, silent noise)  Orders:  -     traZODone (DESYREL) 50 MG tablet; Take 1 tablet by mouth Every Night.  Dispense: 90 tablet; Refill: 1    3. Transient insomnia    4. Mixed hyperlipidemia  -     Comprehensive Metabolic Panel  -     Lipid Panel    5. Vitamin D deficiency  -     Vitamin D 25 Hydroxy    6. Immunization due  -     Pneumococcal Polysaccharide Vaccine 23-Valent Greater Than or Equal To " 1yo Subcutaneous / IM    7. Fatigue, unspecified type  -     TSH Rfx On Abnormal To Free T4  -     Vitamin B12  -     CBC & Differential    8. Memory difficulties  -     TSH Rfx On Abnormal To Free T4  -     Vitamin B12  -     CBC & Differential        Annual wellness visit reviewed with patient.  All past history, medications, social history, and problem list were reviewed.  Discussed advanced directives and living will.  Patient has living will: Living will: Patient refused.  Will check the labs as ordered above to evaluate the blood sugars, kidney, liver, cholesterol for screening.  Discussed flu shot recommended to get the high-dose influenza vaccine annually in the fall.  Pneumovax-23 series discussed.  Encouraged follow-up with the eye doctor on annual basis.  Discussed weight and encouraged exercise as tolerated while following a healthy diet. Colon cancer screening discussed and current status: colonoscopy completed 12/7/12 and repeat after 10 years.  Discussed prostate screening.  Follow up with current specialists as needed.     Message sent to Dr. Palmer's office as he is due for his colonoscopy and they will be contacting him for scheduling.  Memory difficulty that is mild but not diagnostic of dementia.  Will check the labs as ordered.    An After Visit Summary with all of these plans were given to the patient.        Follow Up:  No follow-ups on file.

## 2021-11-19 RX ORDER — SIMVASTATIN 40 MG
TABLET ORAL
Qty: 90 TABLET | Refills: 0 | Status: SHIPPED | OUTPATIENT
Start: 2021-11-19

## 2021-11-19 RX ORDER — GABAPENTIN 300 MG/1
CAPSULE ORAL
Qty: 270 CAPSULE | Refills: 0 | Status: SHIPPED | OUTPATIENT
Start: 2021-11-19

## 2021-11-19 RX ORDER — LISINOPRIL 40 MG/1
TABLET ORAL
Qty: 90 TABLET | Refills: 0 | Status: SHIPPED | OUTPATIENT
Start: 2021-11-19

## 2021-11-19 NOTE — TELEPHONE ENCOUNTER
Last VISIT 07/17/21 f/u visit    Last CPE 01/25/21    Last REFILL 08/12/21 qty 270 w/0 refills    Last LABS 08/02/21 Urine culture done    No Future Appointments       Per PROTOCOL? Not on protocol      Please Approve or Deny.

## 2021-11-26 ENCOUNTER — PROCEDURE (OUTPATIENT)
Dept: SURGERY | Facility: CLINIC | Age: 81
End: 2021-11-26
Payer: MEDICARE

## 2021-11-26 VITALS
WEIGHT: 175 LBS | SYSTOLIC BLOOD PRESSURE: 144 MMHG | HEART RATE: 80 BPM | DIASTOLIC BLOOD PRESSURE: 74 MMHG | HEIGHT: 68 IN | BODY MASS INDEX: 26.52 KG/M2

## 2021-11-26 DIAGNOSIS — R33.9 URINARY RETENTION: Primary | ICD-10-CM

## 2021-11-26 DIAGNOSIS — N35.919 STRICTURE OF MALE URETHRA, UNSPECIFIED STRICTURE TYPE: ICD-10-CM

## 2021-11-26 DIAGNOSIS — N30.01 ACUTE CYSTITIS WITH HEMATURIA: ICD-10-CM

## 2021-11-26 DIAGNOSIS — N40.1 BENIGN PROSTATIC HYPERPLASIA WITH LOWER URINARY TRACT SYMPTOMS, SYMPTOM DETAILS UNSPECIFIED: ICD-10-CM

## 2021-11-26 PROCEDURE — 99213 OFFICE O/P EST LOW 20 MIN: CPT | Performed by: UROLOGY

## 2021-11-26 PROCEDURE — 52000 CYSTOURETHROSCOPY: CPT | Performed by: UROLOGY

## 2021-11-26 RX ORDER — SULFAMETHOXAZOLE AND TRIMETHOPRIM 800; 160 MG/1; MG/1
1 TABLET ORAL ONCE
Status: COMPLETED | OUTPATIENT
Start: 2021-11-26 | End: 2021-11-26

## 2021-11-26 RX ADMIN — SULFAMETHOXAZOLE AND TRIMETHOPRIM 1 TABLET: 800; 160 TABLET ORAL at 11:52:00

## 2021-11-26 NOTE — PROGRESS NOTES
Christofer Tejeda is a 80year old male.     HPI:   Patient presents with:  Urethral Stricture: cystoscopy      26-year-old male in follow-up to visit August 26, 2021 status post cystoscopy, greenlight laser vaporization of the prostate to even out the visit):  Current Outpatient Medications   Medication Sig Dispense Refill   • LISINOPRIL 40 MG Oral Tab TAKE 1 TABLET BY MOUTH EVERY DAY 90 tablet 0   • SIMVASTATIN 40 MG Oral Tab TAKE 1 TABLET BY MOUTH DAILY IN THE EVENING 90 tablet 0   • GABAPENTIN 300 MG glomerulation  U.O's: Normal  Trabeculation: grade 2-3      POST CYSTOSCOPY MEDICATIONS: sample one tablet Cipro 500mg given to patient    DIAGNOSIS: See below    PLAN: See below    Catheterized him with a 14 Faroese straight red rubber catheter without dif

## 2021-11-29 ENCOUNTER — TELEPHONE (OUTPATIENT)
Dept: SURGERY | Facility: CLINIC | Age: 81
End: 2021-11-29

## 2021-11-29 NOTE — TELEPHONE ENCOUNTER
Per pt he is very happy with the new catheters he was given on Friday and states he is out. Per pt he is asking for a new prescription to be sent to the supplier.  Please advise

## 2021-11-29 NOTE — TELEPHONE ENCOUNTER
Spoke with pt wife, verified name and . She states she called [facility] and they need a new script. Advised we can have MD Danielle Hatfield sign one tomorrow and we can fax out, marked urgent.     Tomorrow morning at 8am she will come  15 catheters, 1

## 2021-11-29 NOTE — TELEPHONE ENCOUNTER
Per wife pt needs more catheters and is asking if she can pick some up in the office that he can have until he can get his supplies from [facility]. Please advise

## 2021-11-30 NOTE — TELEPHONE ENCOUNTER
Wife Pebbles Barr picked up more sample (qty 15) red rubber straight catheters for pt from our office. Rx signed by MD for [facility] and faxed with confirmation, marked urgent. Wife will call later on this afternoon to ensure they deliver asap.    Wife apprec

## 2021-12-13 RX ORDER — OMEPRAZOLE 40 MG/1
CAPSULE, DELAYED RELEASE ORAL
Qty: 180 CAPSULE | Refills: 0 | Status: SHIPPED | OUTPATIENT
Start: 2021-12-13

## 2021-12-13 RX ORDER — FINASTERIDE 5 MG/1
TABLET, FILM COATED ORAL
Qty: 90 TABLET | Refills: 3 | Status: SHIPPED | OUTPATIENT
Start: 2021-12-13

## 2021-12-13 NOTE — TELEPHONE ENCOUNTER
Last VISIT - 7/17/21 GI concerns    Last CPE - 1/25/21    Last REFILL -     OMEPRAZOLE 40 MG Oral Capsule Delayed Release 180 capsule 0 9/16/2021      Last LABS - 6/29/21 cbc, cmp    Future Appointments   Date Time Provider Sadia Webb   12/30/2021

## 2021-12-13 NOTE — TELEPHONE ENCOUNTER
LOV 8/26/21 RX refilled per protocol    Benign Prostatic Hypertrophy Medications      Protocol Criteria:  • Appointment scheduled in the past 12 months or in the next 2 months  • If patients is between the ages of 48 and 79 then PSA done within the last 2

## 2021-12-16 RX ORDER — AMLODIPINE BESYLATE 10 MG/1
TABLET ORAL
Qty: 90 TABLET | Refills: 1 | Status: SHIPPED | OUTPATIENT
Start: 2021-12-16

## 2021-12-16 RX ORDER — HYDROCHLOROTHIAZIDE 25 MG/1
TABLET ORAL
Qty: 90 TABLET | Refills: 1 | Status: SHIPPED | OUTPATIENT
Start: 2021-12-16

## 2021-12-16 NOTE — TELEPHONE ENCOUNTER
Last VISIT - 7/17/21 BM concerns    Last CPE - 1/25/21    Last REFILL -     AMLODIPINE 10 MG Oral Tab 90 tablet 1 9/16/2021     HYDROCHLOROTHIAZIDE 25 MG Oral Tab 90 tablet 1 9/16/2021     Last LABS - 6/29/21 cbc, cmp    Future Appointments   Date Time Pro

## 2021-12-30 ENCOUNTER — OFFICE VISIT (OUTPATIENT)
Dept: INTERNAL MEDICINE CLINIC | Facility: CLINIC | Age: 81
End: 2021-12-30
Payer: MEDICARE

## 2021-12-30 VITALS
WEIGHT: 176 LBS | SYSTOLIC BLOOD PRESSURE: 138 MMHG | BODY MASS INDEX: 26.67 KG/M2 | DIASTOLIC BLOOD PRESSURE: 60 MMHG | HEIGHT: 68 IN | OXYGEN SATURATION: 98 % | HEART RATE: 79 BPM | TEMPERATURE: 98 F | RESPIRATION RATE: 16 BRPM

## 2021-12-30 DIAGNOSIS — R04.0 BLEEDING FROM THE NOSE: Primary | ICD-10-CM

## 2021-12-30 PROCEDURE — 99213 OFFICE O/P EST LOW 20 MIN: CPT | Performed by: INTERNAL MEDICINE

## 2021-12-30 NOTE — PROGRESS NOTES
Austin Veliz is a 80year old male. Patient presents with:  Epistaxis      HPI:     C/o nosebleeds every 3-4 days for last 3 weeks. Nose always bleeds from left side. He puts kleenex in his nose when bleeding and it stops within 3- 5 min.  No cold Take 1,000 mg by mouth 3 (three) times daily. • Vitamin D3 2000 units Oral Cap Take 2,000 Units by mouth daily. • Multiple Vitamins-Minerals (CENTRUM SILVER) Oral Tab Take 1 tablet by mouth daily.      • aspirin 81 MG Oral Chew Tab Chew 81 mg by [employer] AND PLAN:   Bleeding from the nose  (primary encounter diagnosis)   -nasal saline and humidifier for his bedroom  -check CBC and PT/INR   -discussed seeing ENT if labs normal and recurrent nose bleeds    Orders Placed This Encounter      CBC W Differential

## 2022-01-24 NOTE — TELEPHONE ENCOUNTER
Last OV 12/30/21  Last PE 1/25/21  Last REFILL   Medication Quantity Refills Start End   METFORMIN HCL  MG Oral Tablet 24 Hr 180 tablet 0 10/22/2021      Last LABS 6/29/21 cbc, cmp    Future Appointments   Date Time Provider Sadia Webb   3/24/

## 2022-01-25 RX ORDER — METFORMIN HYDROCHLORIDE 750 MG/1
TABLET, EXTENDED RELEASE ORAL
Qty: 180 TABLET | Refills: 1 | Status: SHIPPED | OUTPATIENT
Start: 2022-01-25

## 2022-02-28 RX ORDER — SIMVASTATIN 40 MG
TABLET ORAL
Qty: 90 TABLET | Refills: 0 | Status: SHIPPED | OUTPATIENT
Start: 2022-02-28

## 2022-02-28 NOTE — TELEPHONE ENCOUNTER
Last VISIT - 12/30/21 Epistaxis    Last CPE - 1/25/21    Last REFILL -     SIMVASTATIN 40 MG Oral Tab 90 tablet 0 11/19/2021     Last LABS - 6/29/21 CBC, CMP    Per PROTOCOL? FAILED    Please Approve or Deny.

## 2022-03-01 RX ORDER — GABAPENTIN 300 MG/1
CAPSULE ORAL
Qty: 270 CAPSULE | Refills: 0 | Status: SHIPPED | OUTPATIENT
Start: 2022-03-01

## 2022-03-01 RX ORDER — LISINOPRIL 40 MG/1
TABLET ORAL
Qty: 90 TABLET | Refills: 0 | Status: SHIPPED | OUTPATIENT
Start: 2022-03-01

## 2022-03-01 NOTE — TELEPHONE ENCOUNTER
Lisinopril-PASSED per protocol, refill sent     Last OV 12.30.21 w/ TB (acute)   Last PE 1.25.21-Overdue   Last REFILL 11.19.21 Gabapentin 300mg #270 0R  Last LABS 6.29.21 CBC, CMP    Future Appointments   Date Time Provider Sadia Tracey   3/2/2022  8:00 AM Mitzi Mantilla MD [facility]   3/29/2022  8:40 AM Camila Bob MD [facility]         Per PROTOCOL?  Not on protocol     Please Advise

## 2022-03-04 NOTE — TELEPHONE ENCOUNTER
Notified pt of labs in the system for him.   [place] us when he's back on Tuesday to schedule cpe

## 2022-03-08 ENCOUNTER — LAB ENCOUNTER (OUTPATIENT)
Dept: LAB | Age: 82
End: 2022-03-08
Attending: INTERNAL MEDICINE
Payer: MEDICARE

## 2022-03-08 DIAGNOSIS — E78.5 DYSLIPIDEMIA: ICD-10-CM

## 2022-03-08 DIAGNOSIS — R04.0 BLEEDING FROM THE NOSE: ICD-10-CM

## 2022-03-08 LAB
ALBUMIN SERPL-MCNC: 4 G/DL (ref 3.4–5)
ALBUMIN/GLOB SERPL: 1.2 {RATIO} (ref 1–2)
ALP LIVER SERPL-CCNC: 72 U/L
ALT SERPL-CCNC: 24 U/L
ANION GAP SERPL CALC-SCNC: 4 MMOL/L (ref 0–18)
AST SERPL-CCNC: 16 U/L (ref 15–37)
BASOPHILS # BLD AUTO: 0.06 X10(3) UL (ref 0–0.2)
BASOPHILS NFR BLD AUTO: 0.8 %
BUN BLD-MCNC: 38 MG/DL (ref 7–18)
CALCIUM BLD-MCNC: 9.5 MG/DL (ref 8.5–10.1)
CHLORIDE SERPL-SCNC: 112 MMOL/L (ref 98–112)
CHOLEST SERPL-MCNC: 143 MG/DL (ref ?–200)
CO2 SERPL-SCNC: 27 MMOL/L (ref 21–32)
CREAT BLD-MCNC: 1.21 MG/DL
EOSINOPHIL # BLD AUTO: 0.32 X10(3) UL (ref 0–0.7)
EOSINOPHIL NFR BLD AUTO: 4 %
FASTING PATIENT LIPID ANSWER: YES
FASTING STATUS PATIENT QL REPORTED: YES
GLOBULIN PLAS-MCNC: 3.3 G/DL (ref 2.8–4.4)
GLUCOSE BLD-MCNC: 108 MG/DL (ref 70–99)
HCT VFR BLD AUTO: 34.8 %
HDLC SERPL-MCNC: 32 MG/DL (ref 40–59)
HGB BLD-MCNC: 10.8 G/DL
IMM GRANULOCYTES # BLD AUTO: 0.02 X10(3) UL (ref 0–1)
IMM GRANULOCYTES NFR BLD: 0.3 %
INR BLD: 1.01 (ref 0.8–1.2)
LDLC SERPL CALC-MCNC: 81 MG/DL (ref ?–100)
LYMPHOCYTES # BLD AUTO: 1.71 X10(3) UL (ref 1–4)
LYMPHOCYTES NFR BLD AUTO: 21.6 %
MCH RBC QN AUTO: 20.9 PG (ref 26–34)
MCHC RBC AUTO-ENTMCNC: 31 G/DL (ref 31–37)
MCV RBC AUTO: 67.3 FL
MONOCYTES # BLD AUTO: 0.83 X10(3) UL (ref 0.1–1)
MONOCYTES NFR BLD AUTO: 10.5 %
NEUTROPHILS # BLD AUTO: 4.97 X10 (3) UL (ref 1.5–7.7)
NEUTROPHILS # BLD AUTO: 4.97 X10(3) UL (ref 1.5–7.7)
NEUTROPHILS NFR BLD AUTO: 62.8 %
NONHDLC SERPL-MCNC: 111 MG/DL (ref ?–130)
OSMOLALITY SERPL CALC.SUM OF ELEC: 306 MOSM/KG (ref 275–295)
PLATELET # BLD AUTO: 243 10(3)UL (ref 150–450)
POTASSIUM SERPL-SCNC: 3.8 MMOL/L (ref 3.5–5.1)
PROT SERPL-MCNC: 7.3 G/DL (ref 6.4–8.2)
PROTHROMBIN TIME: 13.3 SECONDS (ref 11.6–14.8)
RBC # BLD AUTO: 5.17 X10(6)UL
SODIUM SERPL-SCNC: 143 MMOL/L (ref 136–145)
TRIGL SERPL-MCNC: 175 MG/DL (ref 30–149)
VLDLC SERPL CALC-MCNC: 28 MG/DL (ref 0–30)
WBC # BLD AUTO: 7.9 X10(3) UL (ref 4–11)

## 2022-03-08 PROCEDURE — 85610 PROTHROMBIN TIME: CPT

## 2022-03-08 PROCEDURE — 80061 LIPID PANEL: CPT

## 2022-03-08 PROCEDURE — 36415 COLL VENOUS BLD VENIPUNCTURE: CPT

## 2022-03-08 PROCEDURE — 85025 COMPLETE CBC W/AUTO DIFF WBC: CPT

## 2022-03-08 PROCEDURE — 80053 COMPREHEN METABOLIC PANEL: CPT

## 2022-03-08 NOTE — TELEPHONE ENCOUNTER
Pt scheduled on below for cpe    Future Appointments   Date Time Provider Sadia Webb   5/6/2022  9:00 AM Kelly Silverman MD [facility]

## 2022-03-09 NOTE — TELEPHONE ENCOUNTER
"Chief Complaint  Chest Pain and Palpitations      History of Present Illness  Suzanne Gomes presents to [facility] C[facility]  Patient is a 29-year-old female who has a previous history of drug abuse and had longstanding heart fluttering issues recently increasing frequency.  Seen in the emergency room for an episode the patient felt that her heart was pounding and that her throat tightness along with chest tightness.  Symptoms progressed as the day went on  patient tried some hydroxyzine which she feels may have made the symptoms worse with the patient has had some recurrent episodes since then but not as intense she does feel like these episodes are precipitated by caffeine consumption.    Past Medical History:   Diagnosis Date   • Injury of back          Current Outpatient Medications:   •  hydrOXYzine (ATARAX) 25 MG tablet, Take 1 tablet by mouth 3 (Three) Times a Day As Needed for Itching., Disp: 30 tablet, Rfl: 0    Medications Discontinued During This Encounter   Medication Reason   • ketorolac (TORADOL) 10 MG tablet *Therapy completed   • lamoTRIgine (LaMICtal) 100 MG tablet *Therapy completed   • methylPREDNISolone (MEDROL) 4 MG dose pack *Therapy completed   • OLANZapine (zyPREXA) 5 MG tablet *Therapy completed     No Known Allergies     Social History     Tobacco Use   • Smoking status: Never Smoker   • Smokeless tobacco: Never Used   Vaping Use   • Vaping Use: Never used   Substance Use Topics   • Alcohol use: Not Currently   • Drug use: Never       Family History   Problem Relation Age of Onset   • Hypertension Paternal Grandmother    • Heart disease Paternal Grandmother         Objective     /76   Pulse 74   Ht 170.2 cm (67\")   Wt 56.7 kg (125 lb)   BMI 19.58 kg/m²       Physical Exam    General Appearance:   · no acute distress  · Alert and oriented x3  HENT:   · lips not cyanotic  · Atraumatic  Neck:  · No jvd   · supple  Respiratory:  · no respiratory " Requesting MetFORMIN HCl  MG Oral Tablet 24 Hr  LOV: 04/05/2018  RTC: 1 month  Filled: 04/30/2018 #30 with 0 refills    Future Appointments  Date Time Provider Sadia Webb   5/15/2018 2:00 PM Nadeem Alcaraz MD [facility]   5/15/2018 2:45 distress  · normal breath sounds  · no rales  Cardiovascular:  · Regular rate and rhythm  · no S3, no S4   · no murmur  · no rub  Extremities  · No cyanosis  · lower extremity edema: none    Skin:   · warm, dry  · No rashes    Result Review :     proBNP   Date Value Ref Range Status   02/18/2022 45.7 0.0 - 450.0 pg/mL Final     CMP    CMP 9/21/21 2/18/22   Glucose  127 (A)   Glucose 64 (A)    BUN 11 11   Creatinine 0.7 0.76   eGFR Non  Am  91   eGFR African Am 121    Sodium 137 138   Potassium 4.2 3.7   Chloride 106 104   Calcium 9.2 9.5   Albumin 4.6 4.90   Total Bilirubin 0.47 0.4   Alkaline Phosphatase 41 42   AST (SGOT) 17 14   ALT (SGPT) 13 9   (A) Abnormal value       Comments are available for some flowsheets but are not being displayed.           CBC w/diff    CBC w/Diff 2/18/22   WBC 8.18   RBC 4.38   Hemoglobin 14.0   Hematocrit 40.1   MCV 91.6   MCH 32.0   MCHC 34.9   RDW 12.1 (A)   Platelets 237   Neutrophil Rel % 93.1 (A)   Immature Granulocyte Rel % 0.2   Lymphocyte Rel % 6.0 (A)   Monocyte Rel % 0.6 (A)   Eosinophil Rel % 0.0 (A)   Basophil Rel % 0.1   (A) Abnormal value             Lab Results   Component Value Date    TSH 0.92 09/21/2021      No results found for: FREET4   No results found for: DDIMERQUANT  Magnesium   Date Value Ref Range Status   02/18/2022 2.0 1.6 - 2.6 mg/dL Final      No results found for: DIGOXIN   No results found for: TROPONINT   Lab Results   Component Value Date    POCTROP 0.01 02/18/2022   (                No results found for this or any previous visit.                     The ASCVD Risk score ([place] DC Jr., et al., 2013) failed to calculate for the following reasons:    The 2013 ASCVD risk score is only valid for ages 40 to 79     Diagnoses and all orders for this visit:    1. Palpitations (Primary)  Assessment & Plan:  Patient with episodes that sound possibly related to SVT or anxiety related.  Baseline EKG is within normal limits did  the patient on  trying over-the-counter magnesium supplementation.  We will check a baseline echocardiogram and EKG if these are within normal limits symptoms are likely benign in nature and just attempting to treat for symptomatic purposes.  She did not wish to try any medication therapy at this point and just wanted to wait to see with the test results.    Orders:  -     Adult Transthoracic Echo Complete W/ Cont if Necessary Per Protocol; Future  -     Holter Monitor - 24 Hour; Future          Follow Up     No follow-ups on file.          Patient was given instructions and counseling regarding her condition or for health maintenance advice. Please see specific information pulled into the AVS if appropriate.

## 2022-03-29 ENCOUNTER — OFFICE VISIT (OUTPATIENT)
Dept: SURGERY | Facility: CLINIC | Age: 82
End: 2022-03-29
Payer: MEDICARE

## 2022-03-29 ENCOUNTER — TELEPHONE (OUTPATIENT)
Dept: SURGERY | Facility: CLINIC | Age: 82
End: 2022-03-29

## 2022-03-29 DIAGNOSIS — N40.1 BENIGN PROSTATIC HYPERPLASIA WITH LOWER URINARY TRACT SYMPTOMS, SYMPTOM DETAILS UNSPECIFIED: Primary | ICD-10-CM

## 2022-03-29 DIAGNOSIS — N35.919 STRICTURE OF MALE URETHRA, UNSPECIFIED STRICTURE TYPE: ICD-10-CM

## 2022-03-29 DIAGNOSIS — R33.9 URINARY RETENTION: ICD-10-CM

## 2022-03-29 PROCEDURE — 99213 OFFICE O/P EST LOW 20 MIN: CPT | Performed by: UROLOGY

## 2022-03-31 ENCOUNTER — TELEPHONE (OUTPATIENT)
Dept: INTERNAL MEDICINE CLINIC | Facility: CLINIC | Age: 82
End: 2022-03-31

## 2022-03-31 NOTE — TELEPHONE ENCOUNTER
S/W pt's spouse again and she stated she s/w [facility] at [phone] and they will take a verbal order to provide her with some temporary supply. I called [facility] and s/w Gerda Irwin and told her that I was calling to give a verbal order to them at pt's spouse's request for a tem supply of caths and she took the needed info and will send over the script by fax and we need to have [facility] sign it and we should fax back.

## 2022-03-31 NOTE — TELEPHONE ENCOUNTER
Received DM eye exam results from [facility]. Abstracted. Put in TB bin for review.

## 2022-04-01 NOTE — TELEPHONE ENCOUNTER
I had JOAN sign the script for pt's CIC caths and I faxed it back to [facility] along with the LOV note and the ins cards and received a fax confirmation.

## 2022-05-06 ENCOUNTER — OFFICE VISIT (OUTPATIENT)
Dept: INTERNAL MEDICINE CLINIC | Facility: CLINIC | Age: 82
End: 2022-05-06
Payer: MEDICARE

## 2022-05-06 VITALS
HEART RATE: 76 BPM | TEMPERATURE: 98 F | DIASTOLIC BLOOD PRESSURE: 64 MMHG | RESPIRATION RATE: 16 BRPM | HEIGHT: 67.32 IN | SYSTOLIC BLOOD PRESSURE: 134 MMHG | BODY MASS INDEX: 27.52 KG/M2 | WEIGHT: 177.38 LBS | OXYGEN SATURATION: 95 %

## 2022-05-06 DIAGNOSIS — Z00.00 ENCOUNTER FOR ANNUAL HEALTH EXAMINATION: Primary | ICD-10-CM

## 2022-05-06 DIAGNOSIS — I10 BENIGN ESSENTIAL HTN: ICD-10-CM

## 2022-05-06 DIAGNOSIS — R73.9 BLOOD GLUCOSE ELEVATED: ICD-10-CM

## 2022-05-06 DIAGNOSIS — N40.0 BPH WITHOUT OBSTRUCTION/LOWER URINARY TRACT SYMPTOMS: ICD-10-CM

## 2022-05-06 DIAGNOSIS — N18.31 STAGE 3A CHRONIC KIDNEY DISEASE (HCC): ICD-10-CM

## 2022-05-06 DIAGNOSIS — E78.5 DYSLIPIDEMIA: ICD-10-CM

## 2022-05-06 DIAGNOSIS — K21.9 GASTROESOPHAGEAL REFLUX DISEASE WITHOUT ESOPHAGITIS: ICD-10-CM

## 2022-05-06 DIAGNOSIS — M54.16 LUMBAR RADICULOPATHY: ICD-10-CM

## 2022-05-06 PROBLEM — N30.01 ACUTE CYSTITIS WITH HEMATURIA: Status: RESOLVED | Noted: 2021-05-05 | Resolved: 2022-05-06

## 2022-05-06 PROBLEM — E66.9 OBESITY (BMI 30-39.9): Status: RESOLVED | Noted: 2018-04-05 | Resolved: 2022-05-06

## 2022-05-06 PROBLEM — E87.6 HYPOKALEMIA: Status: RESOLVED | Noted: 2021-05-05 | Resolved: 2022-05-06

## 2022-05-06 PROCEDURE — G0439 PPPS, SUBSEQ VISIT: HCPCS | Performed by: INTERNAL MEDICINE

## 2022-05-27 DIAGNOSIS — E78.5 DYSLIPIDEMIA: ICD-10-CM

## 2022-05-27 RX ORDER — LISINOPRIL 40 MG/1
TABLET ORAL
Qty: 90 TABLET | Refills: 3 | Status: SHIPPED | OUTPATIENT
Start: 2022-05-27

## 2022-05-27 RX ORDER — SIMVASTATIN 40 MG
TABLET ORAL
Qty: 90 TABLET | Refills: 3 | Status: SHIPPED | OUTPATIENT
Start: 2022-05-27

## 2022-06-06 ENCOUNTER — TELEPHONE (OUTPATIENT)
Dept: SURGERY | Facility: CLINIC | Age: 82
End: 2022-06-06

## 2022-06-06 NOTE — TELEPHONE ENCOUNTER
Per spouse states they are having hard time getting catheters from [facility] again states 3rd time please advise

## 2022-06-07 NOTE — TELEPHONE ENCOUNTER
- Received call from [address] [place] [address], [facility]. States he has an order from April and May. He states the reason they keep having delays is because the [employer] brand of red rubber that the patient uses is from [place] and keeps going on back order.  - Pt's order for a 90 days supply will be going out today. However, it is not the [employer] brand. I asked what brand it will be, [facility] states it will be 1 of 4 options that they may have available but he is not sure exactly which one.   Krysta Cornea offered for me to give his phone number to the wife, Ulysses Pierce, if she wants to discuss this further.

## 2022-06-07 NOTE — TELEPHONE ENCOUNTER
- Unable to LVM on home phone listed because memory box is full.  - LMTCB on Hailey's cell. Refer to note below.

## 2022-06-09 ENCOUNTER — TELEPHONE (OUTPATIENT)
Dept: SURGERY | Facility: CLINIC | Age: 82
End: 2022-06-09

## 2022-06-09 NOTE — TELEPHONE ENCOUNTER
Spoke with Perlita from [email or web address]. Gave verbal order for catheters. She states she will fax over updated prescription form for MD to sign. Will keep an eye out for form and place on MD's desk.

## 2022-08-01 RX ORDER — OMEPRAZOLE 40 MG/1
CAPSULE, DELAYED RELEASE ORAL
Qty: 180 CAPSULE | Refills: 0 | Status: SHIPPED | OUTPATIENT
Start: 2022-08-01

## 2022-08-01 NOTE — TELEPHONE ENCOUNTER
Last VISIT - 5/6/22 Wellness visit    Last CPE - 5/6/22    Last REFILL -     OMEPRAZOLE 40 MG Oral Capsule Delayed Release 180 capsule 0 12/13/2021     Last LABS - 3/8/22 cmp, lipid, cbc    Future Appointments   Date Time Provider Sadia Webb   10/4/2022  8:40 AM Hillary Orta MD [facility]     Per PROTOCOL? None    Please Approve or Deny.

## 2022-08-09 ENCOUNTER — HOSPITAL ENCOUNTER (OUTPATIENT)
Dept: CT IMAGING | Age: 82
Discharge: HOME | End: 2022-08-09
Attending: INTERNAL MEDICINE

## 2022-08-09 VITALS — WEIGHT: 177 LBS | BODY MASS INDEX: 27.78 KG/M2 | HEIGHT: 67 IN

## 2022-08-09 DIAGNOSIS — Z13.6 SCREENING FOR HEART DISEASE: ICD-10-CM

## 2022-08-10 ENCOUNTER — PATIENT MESSAGE (OUTPATIENT)
Dept: INTERNAL MEDICINE CLINIC | Facility: CLINIC | Age: 82
End: 2022-08-10

## 2022-08-11 ENCOUNTER — TELEPHONE (OUTPATIENT)
Dept: INTERNAL MEDICINE CLINIC | Facility: CLINIC | Age: 82
End: 2022-08-11

## 2022-08-11 DIAGNOSIS — R93.1 ABNORMAL HEART SCORE CT: Primary | ICD-10-CM

## 2022-08-22 ENCOUNTER — TELEPHONE (OUTPATIENT)
Facility: LOCATION | Age: 82
End: 2022-08-22

## 2022-08-25 ENCOUNTER — ORDER TRANSCRIPTION (OUTPATIENT)
Dept: ADMINISTRATIVE | Facility: HOSPITAL | Age: 82
End: 2022-08-25

## 2022-08-25 DIAGNOSIS — Z20.822 ENCOUNTER FOR PREPROCEDURE SCREENING LABORATORY TESTING FOR COVID-19: Primary | ICD-10-CM

## 2022-08-25 DIAGNOSIS — Z01.812 ENCOUNTER FOR PREPROCEDURE SCREENING LABORATORY TESTING FOR COVID-19: Primary | ICD-10-CM

## 2022-08-26 RX ORDER — GABAPENTIN 300 MG/1
CAPSULE ORAL
Qty: 270 CAPSULE | Refills: 0 | Status: SHIPPED | OUTPATIENT
Start: 2022-08-26

## 2022-09-19 ENCOUNTER — LAB ENCOUNTER (OUTPATIENT)
Dept: LAB | Age: 82
End: 2022-09-19
Attending: INTERNAL MEDICINE

## 2022-09-19 DIAGNOSIS — Z20.822 ENCOUNTER FOR PREPROCEDURE SCREENING LABORATORY TESTING FOR COVID-19: ICD-10-CM

## 2022-09-19 DIAGNOSIS — Z01.812 ENCOUNTER FOR PREPROCEDURE SCREENING LABORATORY TESTING FOR COVID-19: ICD-10-CM

## 2022-09-20 LAB — SARS-COV-2 RNA RESP QL NAA+PROBE: NOT DETECTED

## 2022-09-22 ENCOUNTER — HOSPITAL ENCOUNTER (OUTPATIENT)
Dept: CV DIAGNOSTICS | Age: 82
Discharge: HOME | End: 2022-09-22
Attending: INTERNAL MEDICINE

## 2022-09-22 DIAGNOSIS — I10 HYPERTENSION: ICD-10-CM

## 2022-09-22 DIAGNOSIS — R93.1 ELEVATED CORONARY ARTERY CALCIUM SCORE: ICD-10-CM

## 2022-09-22 PROCEDURE — 78452 HT MUSCLE IMAGE SPECT MULT: CPT | Performed by: INTERNAL MEDICINE

## 2022-09-22 PROCEDURE — 93017 CV STRESS TEST TRACING ONLY: CPT | Performed by: INTERNAL MEDICINE

## 2022-09-22 PROCEDURE — 93018 CV STRESS TEST I&R ONLY: CPT | Performed by: INTERNAL MEDICINE

## 2022-10-04 ENCOUNTER — LAB ENCOUNTER (OUTPATIENT)
Dept: LAB | Age: 82
End: 2022-10-04
Attending: INTERNAL MEDICINE
Payer: MEDICARE

## 2022-10-04 ENCOUNTER — TELEPHONE (OUTPATIENT)
Dept: INTERNAL MEDICINE CLINIC | Facility: CLINIC | Age: 82
End: 2022-10-04

## 2022-10-04 DIAGNOSIS — Z01.818 PREOPERATIVE EXAMINATION, UNSPECIFIED: Primary | ICD-10-CM

## 2022-10-04 DIAGNOSIS — M54.16 LUMBAR RADICULOPATHY: ICD-10-CM

## 2022-10-04 DIAGNOSIS — E87.6 HYPOKALEMIA: Primary | ICD-10-CM

## 2022-10-04 DIAGNOSIS — R73.9 BLOOD GLUCOSE ELEVATED: ICD-10-CM

## 2022-10-04 DIAGNOSIS — E78.5 DYSLIPIDEMIA: ICD-10-CM

## 2022-10-04 LAB
ANION GAP SERPL CALC-SCNC: 5 MMOL/L (ref 0–18)
BASOPHILS # BLD AUTO: 0.05 X10(3) UL (ref 0–0.2)
BASOPHILS NFR BLD AUTO: 0.7 %
BUN BLD-MCNC: 33 MG/DL (ref 7–18)
CALCIUM BLD-MCNC: 9.4 MG/DL (ref 8.5–10.1)
CHLORIDE SERPL-SCNC: 110 MMOL/L (ref 98–112)
CO2 SERPL-SCNC: 29 MMOL/L (ref 21–32)
CREAT BLD-MCNC: 1.21 MG/DL
EOSINOPHIL # BLD AUTO: 0.31 X10(3) UL (ref 0–0.7)
EOSINOPHIL NFR BLD AUTO: 4.1 %
ERYTHROCYTE [DISTWIDTH] IN BLOOD BY AUTOMATED COUNT: 16.1 %
EST. AVERAGE GLUCOSE BLD GHB EST-MCNC: 117 MG/DL (ref 68–126)
FASTING STATUS PATIENT QL REPORTED: YES
GFR SERPLBLD BASED ON 1.73 SQ M-ARVRAT: 61 ML/MIN/1.73M2 (ref 60–?)
GLUCOSE BLD-MCNC: 104 MG/DL (ref 70–99)
HBA1C MFR BLD: 5.7 % (ref ?–5.7)
HCT VFR BLD AUTO: 35.2 %
HGB BLD-MCNC: 10.8 G/DL
IMM GRANULOCYTES # BLD AUTO: 0.02 X10(3) UL (ref 0–1)
IMM GRANULOCYTES NFR BLD: 0.3 %
LYMPHOCYTES # BLD AUTO: 1.54 X10(3) UL (ref 1–4)
LYMPHOCYTES NFR BLD AUTO: 20.6 %
MCH RBC QN AUTO: 20.6 PG (ref 26–34)
MCHC RBC AUTO-ENTMCNC: 30.7 G/DL (ref 31–37)
MCV RBC AUTO: 67.2 FL
MONOCYTES # BLD AUTO: 0.75 X10(3) UL (ref 0.1–1)
MONOCYTES NFR BLD AUTO: 10 %
NEUTROPHILS # BLD AUTO: 4.8 X10 (3) UL (ref 1.5–7.7)
NEUTROPHILS # BLD AUTO: 4.8 X10(3) UL (ref 1.5–7.7)
NEUTROPHILS NFR BLD AUTO: 64.3 %
OSMOLALITY SERPL CALC.SUM OF ELEC: 306 MOSM/KG (ref 275–295)
PLATELET # BLD AUTO: 228 10(3)UL (ref 150–450)
POTASSIUM SERPL-SCNC: 3.3 MMOL/L (ref 3.5–5.1)
RBC # BLD AUTO: 5.24 X10(6)UL
SODIUM SERPL-SCNC: 144 MMOL/L (ref 136–145)
WBC # BLD AUTO: 7.5 X10(3) UL (ref 4–11)

## 2022-10-04 PROCEDURE — 83036 HEMOGLOBIN GLYCOSYLATED A1C: CPT

## 2022-10-04 PROCEDURE — 36415 COLL VENOUS BLD VENIPUNCTURE: CPT

## 2022-10-04 PROCEDURE — 85025 COMPLETE CBC W/AUTO DIFF WBC: CPT

## 2022-10-04 PROCEDURE — 80048 BASIC METABOLIC PNL TOTAL CA: CPT

## 2022-10-05 LAB — SARS-COV-2 RNA RESP QL NAA+PROBE: NOT DETECTED

## 2022-10-05 RX ORDER — MULTIVIT WITH MINERALS/LUTEIN
1000 TABLET ORAL DAILY
COMMUNITY

## 2022-10-07 ENCOUNTER — HOSPITAL ENCOUNTER (OUTPATIENT)
Dept: INTERVENTIONAL RADIOLOGY/VASCULAR | Facility: HOSPITAL | Age: 82
Discharge: HOME | End: 2022-10-07
Attending: INTERNAL MEDICINE | Admitting: INTERNAL MEDICINE
Payer: MEDICARE

## 2022-10-07 VITALS
SYSTOLIC BLOOD PRESSURE: 139 MMHG | HEART RATE: 70 BPM | WEIGHT: 175 LBS | OXYGEN SATURATION: 96 % | RESPIRATION RATE: 26 BRPM | DIASTOLIC BLOOD PRESSURE: 46 MMHG | TEMPERATURE: 97 F | BODY MASS INDEX: 27.47 KG/M2 | HEIGHT: 67 IN

## 2022-10-07 DIAGNOSIS — R93.1 ELEVATED CORONARY ARTERY CALCIUM SCORE: ICD-10-CM

## 2022-10-07 DIAGNOSIS — R94.39 ABNORMAL STRESS TEST: ICD-10-CM

## 2022-10-07 LAB — ISTAT ACTIVATED CLOTTING TIME: 202 SECONDS (ref 74–137)

## 2022-10-07 PROCEDURE — 4A033BC MEASUREMENT OF ARTERIAL PRESSURE, CORONARY, PERCUTANEOUS APPROACH: ICD-10-PCS | Performed by: INTERNAL MEDICINE

## 2022-10-07 PROCEDURE — 92978 ENDOLUMINL IVUS OCT C 1ST: CPT | Performed by: INTERNAL MEDICINE

## 2022-10-07 PROCEDURE — 99152 MOD SED SAME PHYS/QHP 5/>YRS: CPT | Performed by: INTERNAL MEDICINE

## 2022-10-07 PROCEDURE — B2111ZZ FLUOROSCOPY OF MULTIPLE CORONARY ARTERIES USING LOW OSMOLAR CONTRAST: ICD-10-PCS | Performed by: INTERNAL MEDICINE

## 2022-10-07 PROCEDURE — 92979 ENDOLUMINL IVUS OCT C EA: CPT | Performed by: INTERNAL MEDICINE

## 2022-10-07 PROCEDURE — 99153 MOD SED SAME PHYS/QHP EA: CPT | Performed by: INTERNAL MEDICINE

## 2022-10-07 PROCEDURE — 85347 COAGULATION TIME ACTIVATED: CPT

## 2022-10-07 PROCEDURE — 93572 IV DOP VEL&/PRESS C FLO EA: CPT | Performed by: INTERNAL MEDICINE

## 2022-10-07 PROCEDURE — B2151ZZ FLUOROSCOPY OF LEFT HEART USING LOW OSMOLAR CONTRAST: ICD-10-PCS | Performed by: INTERNAL MEDICINE

## 2022-10-07 PROCEDURE — 4A023N7 MEASUREMENT OF CARDIAC SAMPLING AND PRESSURE, LEFT HEART, PERCUTANEOUS APPROACH: ICD-10-PCS | Performed by: INTERNAL MEDICINE

## 2022-10-07 PROCEDURE — 93571 IV DOP VEL&/PRESS C FLO 1ST: CPT | Performed by: INTERNAL MEDICINE

## 2022-10-07 PROCEDURE — 93458 L HRT ARTERY/VENTRICLE ANGIO: CPT | Performed by: INTERNAL MEDICINE

## 2022-10-07 RX ORDER — LIDOCAINE HYDROCHLORIDE 10 MG/ML
INJECTION, SOLUTION EPIDURAL; INFILTRATION; INTRACAUDAL; PERINEURAL
Status: COMPLETED
Start: 2022-10-07 | End: 2022-10-07

## 2022-10-07 RX ORDER — SODIUM CHLORIDE 9 MG/ML
INJECTION, SOLUTION INTRAVENOUS
Status: DISCONTINUED | OUTPATIENT
Start: 2022-10-08 | End: 2022-10-07 | Stop reason: HOSPADM

## 2022-10-07 RX ORDER — DIPHENHYDRAMINE HYDROCHLORIDE 50 MG/ML
INJECTION INTRAMUSCULAR; INTRAVENOUS
Status: DISCONTINUED
Start: 2022-10-07 | End: 2022-10-07 | Stop reason: WASHOUT

## 2022-10-07 RX ORDER — HEPARIN SODIUM 5000 [USP'U]/ML
INJECTION, SOLUTION INTRAVENOUS; SUBCUTANEOUS
Status: DISCONTINUED
Start: 2022-10-07 | End: 2022-10-07 | Stop reason: WASHOUT

## 2022-10-07 RX ORDER — HEPARIN SODIUM 5000 [USP'U]/ML
INJECTION, SOLUTION INTRAVENOUS; SUBCUTANEOUS
Status: COMPLETED
Start: 2022-10-07 | End: 2022-10-07

## 2022-10-07 RX ORDER — VERAPAMIL HYDROCHLORIDE 2.5 MG/ML
INJECTION, SOLUTION INTRAVENOUS
Status: COMPLETED
Start: 2022-10-07 | End: 2022-10-07

## 2022-10-07 RX ORDER — MIDAZOLAM HYDROCHLORIDE 1 MG/ML
INJECTION INTRAMUSCULAR; INTRAVENOUS
Status: COMPLETED
Start: 2022-10-07 | End: 2022-10-07

## 2022-10-07 RX ADMIN — SODIUM CHLORIDE: 9 INJECTION, SOLUTION INTRAVENOUS at 07:30:00

## 2022-10-07 NOTE — PROCEDURES
[address]    Cardiac Catheterization Note    Primary Proceduralist: Gavino Champion MD  Procedure Performed: LHC, iFR LAD, iFR LCX, iFR RCA  Date of Procedure: 10/7/2022   Indication: CAD, elevated CAC, abnormal stress test  Estimated blood loss: 10cc  Specimens: None    Consent obtained from the patient and documented in the paper chart, unless verbally obtained in an emergency setting. The benefits and risk of the procedure, including but not limited to infection, bleeding, myocardial infarction, stroke, vascular injury, emergency surgery, renal failure requiring dialysis and death were discussed with the patient. These complications occur in approximately 1-2% of elective procedures, but the risk may be significantly elevated in the setting of acute coronary syndrome, electrical or hemodynamic instability, multivessel disease, reduced LVEF or . The patient consented to any additional procedures performed emergently in order to address a complication or prevent medical deterioration. Viable alternatives were explained to the patient, including continuing medical therapy, with the risks incurred along that course. Procedure/Technique:    Lidocaine 1% was administered locally. Access of right radial artery obtained using Seldinger technique. Ultrasound was not used. 6 Fr Sheath was inserted. J-wire advanced into the aorta under fluoroscopy. Left coronary system engaged using 6 Fr JL3.5 diagnostic cath. Selective angiogram performed. Right coronary system engaged using 6 Fr JR4 diagnostic cath. Selective angiogram performed. 6 r pigtail catheter advanced into the LV. Hemodynamics were obtained. Coronary Angiogram Findings:  1. LM: Large caliber artery giving rise to LAD & LCX. 20% proximal stenosis. 2. LAD: Large caliber artery giving rise to diagonal and septal branches. 70% mid LAD stenosis (iFR 0.86)  3.  LCX: Medium to large caliber artery giving rise to OM branches. 40% proximal LCX stenosis. 4. RCA: Large caliber artery giving rise to acute marginal branches, and bifurcates into RPDA & RPL. 50% mid RCA stenosis. Right dominant. Hemodynamics:  /3, LVEDP 12-14  /54, mean 92  No significant gradient upon Ao-LV pullback  LVEF 50%      Intervention:  6 Fr XB 3.5 Guide  iFR LAD (0.86)  iFR LCX (0.98)  6 Fr JR4 Guide  iFR RCA (1.0)    Monitored sedation administered by the cath lab RN, and supervised throughout the procedure by myself. Total time 55 minutes. Closure: Radial band. No immediate complications. A/P:  1. MV-CAD including 70% mid LAD (iFR 0.86 indicating hemodynamically significant stenosis), 20% prox LM, 40% pLCX (iFR 0.98), 50% mid RCA (iFR 1.0). 2. Right dominant coronary circulation. Severe diffuse calcification. 3. Will require staged LAD PCI +/- atherectomy. 4. Continue ASA, statin  5.  TR band  6. DC later today      Mateo Grover MD  Interventional Cardiology  [address] [place] [address]

## 2022-10-07 NOTE — PROGRESS NOTES
Pt s/p LHC via R Radial artery with TR Band placed-air released per protocol with no bleeding/hematoma noted throughout recovery period. Coban dressing and arm board placed to site after TR Band removed. VSS. Denied pain or numbness/tingling to RUE. Pt tolerated PO intake and ambulation in room. IV d/c'd. Discharge instructions given-pt verbalized understanding.  Pt to lobby via [facility] and daughter to drive home

## 2022-10-12 RX ORDER — HYDROCHLOROTHIAZIDE 25 MG/1
TABLET ORAL
Qty: 90 TABLET | Refills: 1 | OUTPATIENT
Start: 2022-10-12

## 2022-10-12 RX ORDER — AMLODIPINE BESYLATE 10 MG/1
10 TABLET ORAL DAILY
Qty: 90 TABLET | Refills: 1 | OUTPATIENT
Start: 2022-10-12

## 2022-10-12 RX ORDER — AMLODIPINE BESYLATE 10 MG/1
TABLET ORAL
Qty: 90 TABLET | Refills: 1 | OUTPATIENT
Start: 2022-10-12

## 2022-10-12 RX ORDER — HYDROCHLOROTHIAZIDE 25 MG/1
25 TABLET ORAL DAILY
Qty: 90 TABLET | Refills: 1 | OUTPATIENT
Start: 2022-10-12

## 2022-10-13 ENCOUNTER — LAB ENCOUNTER (OUTPATIENT)
Dept: LAB | Age: 82
End: 2022-10-13
Attending: INTERNAL MEDICINE
Payer: MEDICARE

## 2022-10-13 DIAGNOSIS — R35.0 URINARY FREQUENCY: ICD-10-CM

## 2022-10-13 DIAGNOSIS — E87.6 HYPOKALEMIA: ICD-10-CM

## 2022-10-13 LAB
ANION GAP SERPL CALC-SCNC: 6 MMOL/L (ref 0–18)
BUN BLD-MCNC: 34 MG/DL (ref 7–18)
CALCIUM BLD-MCNC: 9.7 MG/DL (ref 8.5–10.1)
CHLORIDE SERPL-SCNC: 106 MMOL/L (ref 98–112)
CO2 SERPL-SCNC: 28 MMOL/L (ref 21–32)
CREAT BLD-MCNC: 1.29 MG/DL
FASTING STATUS PATIENT QL REPORTED: NO
GFR SERPLBLD BASED ON 1.73 SQ M-ARVRAT: 56 ML/MIN/1.73M2 (ref 60–?)
GLUCOSE BLD-MCNC: 111 MG/DL (ref 70–99)
OSMOLALITY SERPL CALC.SUM OF ELEC: 298 MOSM/KG (ref 275–295)
POTASSIUM SERPL-SCNC: 4 MMOL/L (ref 3.5–5.1)
SODIUM SERPL-SCNC: 140 MMOL/L (ref 136–145)

## 2022-10-13 PROCEDURE — 36415 COLL VENOUS BLD VENIPUNCTURE: CPT

## 2022-10-13 PROCEDURE — 80048 BASIC METABOLIC PNL TOTAL CA: CPT

## 2022-10-13 RX ORDER — HYDROCHLOROTHIAZIDE 25 MG/1
25 TABLET ORAL DAILY
Qty: 90 TABLET | Refills: 1 | Status: SHIPPED | OUTPATIENT
Start: 2022-10-13

## 2022-10-13 RX ORDER — AMLODIPINE BESYLATE 10 MG/1
10 TABLET ORAL DAILY
Qty: 90 TABLET | Refills: 1 | Status: SHIPPED | OUTPATIENT
Start: 2022-10-13

## 2022-10-14 ENCOUNTER — HOSPITAL ENCOUNTER (OUTPATIENT)
Dept: CV DIAGNOSTICS | Facility: HOSPITAL | Age: 82
Discharge: HOME | End: 2022-10-14
Attending: INTERNAL MEDICINE
Payer: MEDICARE

## 2022-10-14 ENCOUNTER — LAB ENCOUNTER (OUTPATIENT)
Dept: LAB | Age: 82
End: 2022-10-14
Attending: INTERNAL MEDICINE
Payer: MEDICARE

## 2022-10-14 DIAGNOSIS — R93.1 ELEVATED CORONARY ARTERY CALCIUM SCORE: ICD-10-CM

## 2022-10-14 DIAGNOSIS — E78.5 DYSLIPIDEMIA: ICD-10-CM

## 2022-10-14 DIAGNOSIS — R35.0 URINARY FREQUENCY: ICD-10-CM

## 2022-10-14 DIAGNOSIS — I10 PRIMARY HYPERTENSION: ICD-10-CM

## 2022-10-14 LAB
BILIRUB UR QL STRIP.AUTO: NEGATIVE
COLOR UR AUTO: YELLOW
GLUCOSE UR STRIP.AUTO-MCNC: NEGATIVE MG/DL
KETONES UR STRIP.AUTO-MCNC: NEGATIVE MG/DL
NITRITE UR QL STRIP.AUTO: NEGATIVE
PH UR STRIP.AUTO: 5 [PH] (ref 5–8)
PROT UR STRIP.AUTO-MCNC: 100 MG/DL
RBC UR QL AUTO: NEGATIVE
SP GR UR STRIP.AUTO: 1.01 (ref 1–1.03)
UROBILINOGEN UR STRIP.AUTO-MCNC: <2 MG/DL
WBC #/AREA URNS AUTO: >50 /HPF
WBC CLUMPS UR QL AUTO: PRESENT /HPF

## 2022-10-14 PROCEDURE — 87186 SC STD MICRODIL/AGAR DIL: CPT

## 2022-10-14 PROCEDURE — 87086 URINE CULTURE/COLONY COUNT: CPT

## 2022-10-14 PROCEDURE — 93306 TTE W/DOPPLER COMPLETE: CPT | Performed by: INTERNAL MEDICINE

## 2022-10-14 PROCEDURE — 87077 CULTURE AEROBIC IDENTIFY: CPT

## 2022-10-14 PROCEDURE — 81001 URINALYSIS AUTO W/SCOPE: CPT

## 2022-10-16 RX ORDER — CEPHALEXIN 500 MG/1
500 CAPSULE ORAL 2 TIMES DAILY
Qty: 14 CAPSULE | Refills: 0 | Status: SHIPPED | OUTPATIENT
Start: 2022-10-16 | End: 2022-10-23

## 2022-10-25 ENCOUNTER — LAB ENCOUNTER (OUTPATIENT)
Dept: LAB | Facility: HOSPITAL | Age: 82
End: 2022-10-25
Attending: INTERNAL MEDICINE
Payer: MEDICARE

## 2022-10-25 DIAGNOSIS — Z13.9 SCREENING PROCEDURE: ICD-10-CM

## 2022-10-25 RX ORDER — CLOPIDOGREL BISULFATE 75 MG/1
75 TABLET ORAL DAILY
COMMUNITY

## 2022-10-25 NOTE — PROGRESS NOTES
Called pt with pre procedure instructions and noticed the pt is not on plavix. Called [facility], talked to [place] and she called the patient with plavix instructions and called in a script for them. Pt will start plavix today.

## 2022-10-26 LAB
AMB EXT HEMATOCRIT: 35.2
AMB EXT HEMOGLOBIN: 10.8
AMB EXT PLATELETS: 228
AMB EXT WBC: 7.5 X10(3)UL
SARS-COV-2 RNA RESP QL NAA+PROBE: NOT DETECTED

## 2022-10-28 ENCOUNTER — HOSPITAL ENCOUNTER (OUTPATIENT)
Dept: INTERVENTIONAL RADIOLOGY/VASCULAR | Facility: HOSPITAL | Age: 82
Discharge: HOME | End: 2022-10-28
Attending: INTERNAL MEDICINE | Admitting: INTERNAL MEDICINE
Payer: MEDICARE

## 2022-10-28 VITALS
SYSTOLIC BLOOD PRESSURE: 150 MMHG | OXYGEN SATURATION: 97 % | BODY MASS INDEX: 27.47 KG/M2 | TEMPERATURE: 97 F | HEART RATE: 80 BPM | HEIGHT: 67 IN | WEIGHT: 175 LBS | RESPIRATION RATE: 21 BRPM | DIASTOLIC BLOOD PRESSURE: 57 MMHG

## 2022-10-28 DIAGNOSIS — R94.39 ABNORMAL STRESS TEST: ICD-10-CM

## 2022-10-28 DIAGNOSIS — R93.1 ELEVATED CORONARY ARTERY CALCIUM SCORE: ICD-10-CM

## 2022-10-28 LAB — ISTAT ACTIVATED CLOTTING TIME: 260 SECONDS (ref 74–137)

## 2022-10-28 PROCEDURE — 93010 ELECTROCARDIOGRAM REPORT: CPT | Performed by: INTERNAL MEDICINE

## 2022-10-28 PROCEDURE — 93005 ELECTROCARDIOGRAM TRACING: CPT

## 2022-10-28 PROCEDURE — 027034Z DILATION OF CORONARY ARTERY, ONE ARTERY WITH DRUG-ELUTING INTRALUMINAL DEVICE, PERCUTANEOUS APPROACH: ICD-10-PCS | Performed by: INTERNAL MEDICINE

## 2022-10-28 PROCEDURE — 99153 MOD SED SAME PHYS/QHP EA: CPT | Performed by: INTERNAL MEDICINE

## 2022-10-28 PROCEDURE — 92978 ENDOLUMINL IVUS OCT C 1ST: CPT | Performed by: INTERNAL MEDICINE

## 2022-10-28 PROCEDURE — 99211 OFF/OP EST MAY X REQ PHY/QHP: CPT

## 2022-10-28 PROCEDURE — 85347 COAGULATION TIME ACTIVATED: CPT

## 2022-10-28 PROCEDURE — B240ZZ3 ULTRASONOGRAPHY OF SINGLE CORONARY ARTERY, INTRAVASCULAR: ICD-10-PCS | Performed by: INTERNAL MEDICINE

## 2022-10-28 PROCEDURE — 99152 MOD SED SAME PHYS/QHP 5/>YRS: CPT | Performed by: INTERNAL MEDICINE

## 2022-10-28 RX ORDER — MIDAZOLAM HYDROCHLORIDE 1 MG/ML
INJECTION INTRAMUSCULAR; INTRAVENOUS
Status: COMPLETED
Start: 2022-10-28 | End: 2022-10-28

## 2022-10-28 RX ORDER — OMEPRAZOLE 40 MG/1
CAPSULE, DELAYED RELEASE ORAL
Qty: 180 CAPSULE | Refills: 0 | Status: SHIPPED | OUTPATIENT
Start: 2022-10-28

## 2022-10-28 RX ORDER — IODIXANOL 320 MG/ML
100 INJECTION, SOLUTION INTRAVASCULAR
Status: COMPLETED | OUTPATIENT
Start: 2022-10-28 | End: 2022-10-28

## 2022-10-28 RX ORDER — SODIUM CHLORIDE 9 MG/ML
INJECTION, SOLUTION INTRAVENOUS
Status: COMPLETED | OUTPATIENT
Start: 2022-10-29 | End: 2022-10-28

## 2022-10-28 RX ORDER — LIDOCAINE HYDROCHLORIDE 10 MG/ML
INJECTION, SOLUTION EPIDURAL; INFILTRATION; INTRACAUDAL; PERINEURAL
Status: COMPLETED
Start: 2022-10-28 | End: 2022-10-28

## 2022-10-28 RX ORDER — HEPARIN SODIUM 5000 [USP'U]/ML
INJECTION, SOLUTION INTRAVENOUS; SUBCUTANEOUS
Status: COMPLETED
Start: 2022-10-28 | End: 2022-10-28

## 2022-10-28 RX ORDER — ASPIRIN 81 MG/1
81 TABLET ORAL DAILY
Status: DISCONTINUED | OUTPATIENT
Start: 2022-10-29 | End: 2022-10-28

## 2022-10-28 RX ORDER — SODIUM CHLORIDE 9 MG/ML
INJECTION, SOLUTION INTRAVENOUS CONTINUOUS
Status: DISCONTINUED | OUTPATIENT
Start: 2022-10-28 | End: 2022-10-28

## 2022-10-28 RX ORDER — CLOPIDOGREL BISULFATE 75 MG/1
75 TABLET ORAL DAILY
Status: DISCONTINUED | OUTPATIENT
Start: 2022-10-29 | End: 2022-10-28

## 2022-10-28 RX ADMIN — SODIUM CHLORIDE: 9 INJECTION, SOLUTION INTRAVENOUS at 08:57:00

## 2022-10-28 RX ADMIN — IODIXANOL 70 ML: 320 INJECTION, SOLUTION INTRAVASCULAR at 10:15:00

## 2022-10-28 NOTE — DISCHARGE INSTRUCTIONS
HOME CARE INSTRUCTIONS FOLLOWING CORONARY ANGIOGRAPHY, PERIPHERAL ANGIOGRAPHY, ANGIOPLASTY (PTCA/PTA) OR INSERTION OF STENT IN THE CORONARY, CAROTID, AND/OR PERIPHERAL ARTERIES    ACTIVITY:  ~  DO NOT drive after the procedure. You may resume driving on Sunday.   ~  Plan on resting and relaxing tonight and tomorrow. You may resume your normal activity as tolerated after 48 hours. ~  Do not lift anything heavy over 10 pounds for the next 48 hours and 20 pounds for a week. ~ Avoid sexual activity for the next 24 hours. ~ Avoid drinking alcohol for the next 24 hours.   ~ If the groin site was used, avoid repeated stair use and excessive walking for the next 24 hours. WHAT IS NORMAL?  ~ A small lump at the procedure site associated with mild tenderness when touched  ~ The procedure site may be bruised or discolored. ~ There may be a small amount of drainage on the bandage. SPECIAL INSTRUCTIONS:   ~ Drink plenty of fluids during the next 24-48 hours to \"flush\" the contrast from your system. ~ The bandage is to remain in place for 24 hours.  ~ Keep the bandage clean and dry.  ~ DO NOT submerge the procedure site for 72 hours (NO tub baths or pools)    ~ After 24 hours, you MUST remove the bandage.  ~ You should shower AFTER removing the bandage and wash the procedure site GENTLY with soap and water. You do not have to cover it up. You may apply a bandaid for 24 hours if you prefer. WHEN YOU SHOULD NOTIFY YOUR PHYSICIAN:  ~ Bleeding can occur at the procedure site. Both on the outside of the skin and/or beneath the surface of the skin. ~ Swelling or a large lump at the procedure site can occur, which may be accompanied by moderate to severe pain. IF EITHER OF THE ABOVE OCCURS, LIE DOWN FLAT, AND HAVE SOMEONE APPLY PRESSURE TO THE PROCEDURE SITE WITH BOTH HANDS AS INSTRUCTED BY YOUR NURSE. HOLD PRESSURE FOR 20 MINUTES AND THE BLEEDING SHOULD STOP. NOTIFY YOUR PHYSICIAN OF THE OCCURRENCE.      IF THE BLEEDING DOES NOT STOP, CALL 911 AND CONTINUE TO APPLY PRESSURE     ~ If you experience signs of a fever, temperature greater than 101, chills, infection (redness, swelling, thick yellow drainage, or a foul odor from procedure site)   ~ If you notice any numbness, tingling or loss of feeling to your leg, foot  for groin access    IF YOU RECEIVED A STENT:    ~ You will remain on an antiplatelet drug and/or aspirin. Antiplatelet medications are usually taken for 6 mons to a year and you SHOULD NOT STOP TAKING THEM UNLESS YOUR CARDIOLOGIST DIRECTS YOU TO DO SO. These medications help to prevent blockage at the stent site. If another physician or dentist asks you to stop your antiplatelet medication, YOU NEED TO CONSULT YOUR CARDIOLOGIST FIRST. Together, your cardiologist and other physician can discuss the risks that may be involved if you are not taking the antiplatelet medication. ~If an MRI is necessary, it may be done 4-6 weeks after your procedure. Verify this with your cardiologist.  ~ [address]. A duplicate card CANNOT be reproduced. If you need an MRI, your stent card will need to be shown to the technologist before performing the MRI. OTHER:   ~ You may resume your present diet and medications as prescribed unless otherwise directed by your physician.   ~ Resume Metformin on Sunday.   ~ Follow up with Dr Nidhi Rehman on 11/15 at 1:20PM   ~ IF you have any questions for TONIGHT ONLY, you may call the nurse on call at [phone]. After tonight, follow up with MDs office.

## 2022-10-28 NOTE — CARDIAC REHAB
Cardiac rehab education completed with patient and wife  Patient referred to cardiac rehab  Patient lives in [place] information given for closer rehab

## 2022-10-28 NOTE — PROGRESS NOTES
Rc'd pt from cath lab s/p PCI to LAD in stable condition. VSS. Perclose to right groin is soft, clean and dry. No bleeding or hematoma. Pt denies c/o pain or discomfort. Dr Sherin Strange at bedside along with pts wife. 12L EKG done. Cardiac rehab notified. 12:30: Pt straight cathed himself large amount of clear urine. Cardiac rehab at bedside. Pt tolerating po intake well. 14:15: Bedrest completed. Pt ambulated and tolerated well. Groin stable. IV removed with catheter intact. Reviewed discharge instructions with pt and wife, verbalizes understanding. Home via w/c in stable condition without c/o. Pts wife is the .

## 2022-10-28 NOTE — PROCEDURES
[address]    Cardiac Catheterization Note    Primary Proceduralist: Mary Schneider MD  Procedure Performed: LAD IVUS, LAD PCI  Date of Procedure: 10/28/2022  Indication: CAD  Estimated blood loss: 10cc  Specimens: None    Consent obtained from the patient and documented in the paper chart, unless verbally obtained in an emergency setting. The benefits and risk of the procedure, including but not limited to infection, bleeding, myocardial infarction, stroke, vascular injury, emergency surgery, renal failure requiring dialysis and death were discussed with the patient. These complications occur in approximately 1-2% of elective procedures, but the risk may be significantly elevated in the setting of acute coronary syndrome, electrical or hemodynamic instability, multivessel disease, reduced LVEF or . The patient consented to any additional procedures performed emergently in order to address a complication or prevent medical deterioration. Viable alternatives were explained to the patient, including continuing medical therapy, with the risks incurred along that course. Procedure/Technique:    Lidocaine 1% was administered locally. Access of right femoral artery obtained using Seldinger technique. Ultrasound was not used. 7 Fr Sheath was inserted. J-wire advanced into the aorta under fluoroscopy. Left coronary system engaged using 7 Fr XB 3.5 Guide. Selective angiogram performed. Coronary Angiogram Findings:  1. LM: Large caliber artery giving rise to LAD & LCX. 20% proximal stenosis. 2. LAD: Large caliber artery giving rise to diagonal and septal branches. 70% mid LAD stenosis   3. LCX: Medium to large caliber artery giving rise to OM branches. 40% proximal LCX stenosis.     Hemodynamics:  /66, mean 97    Intervention:  7 Fr XB 3.5 Guide  Runthough (LAD)  Runthrough (diagonal) for protection  IVUS performed of LAD  Pre-dilated using 3.0 & 3.0 NC balloons  MARV placement (Osvaldo 3.0 x 26mm)  P.E using 3.0 (distal) then 3.5 (proximal) up to 20 sabine  Angiogram showed COLEMAN III flow (lesion 70% > 0%)    Monitored sedation administered by the cath lab RN, and supervised throughout the procedure by myself. Total time 28 minutes. Closure: Femoral angiogram performed. Perclosure performed. No immediate complications. A/P:  1. Successful PCI of 70% mid LAD stenosis s/p MARV (3.0 x 26mm)  2. Continue DAPT, statin  3.  Tentative discharge later today    Anisa Hackett MD  Interventional Cardiology  [address] [place] [address]

## 2022-10-29 LAB
ATRIAL RATE: 74 BPM
P AXIS: 59 DEGREES
P-R INTERVAL: 190 MS
Q-T INTERVAL: 442 MS
QRS DURATION: 170 MS
QTC CALCULATION (BEZET): 490 MS
R AXIS: -69 DEGREES
T AXIS: 67 DEGREES
VENTRICULAR RATE: 74 BPM

## 2022-11-20 ENCOUNTER — HOSPITAL ENCOUNTER (OUTPATIENT)
Age: 82
Discharge: HOME | End: 2022-11-20
Payer: MEDICARE

## 2022-11-20 VITALS
OXYGEN SATURATION: 96 % | SYSTOLIC BLOOD PRESSURE: 160 MMHG | RESPIRATION RATE: 16 BRPM | DIASTOLIC BLOOD PRESSURE: 64 MMHG | TEMPERATURE: 100 F | HEART RATE: 80 BPM

## 2022-11-20 DIAGNOSIS — B34.9 VIRAL SYNDROME: Primary | ICD-10-CM

## 2022-11-20 DIAGNOSIS — R52 BODY ACHES: ICD-10-CM

## 2022-11-20 LAB
POCT INFLUENZA A: NEGATIVE
POCT INFLUENZA B: NEGATIVE
SARS-COV-2 RNA RESP QL NAA+PROBE: NOT DETECTED

## 2022-11-20 NOTE — ED INITIAL ASSESSMENT (HPI)
Patient was a little tired yesterday, but today has body aches and fatigue. Feels like he needs to clear his throat but no respiratory distress.

## 2022-11-21 RX ORDER — GABAPENTIN 300 MG/1
CAPSULE ORAL
Qty: 270 CAPSULE | Refills: 0 | Status: SHIPPED | OUTPATIENT
Start: 2022-11-21

## 2022-11-23 ENCOUNTER — OFFICE VISIT (OUTPATIENT)
Dept: INTERNAL MEDICINE CLINIC | Facility: CLINIC | Age: 82
End: 2022-11-23
Payer: MEDICARE

## 2022-11-23 VITALS
HEART RATE: 73 BPM | WEIGHT: 178 LBS | RESPIRATION RATE: 18 BRPM | SYSTOLIC BLOOD PRESSURE: 118 MMHG | DIASTOLIC BLOOD PRESSURE: 56 MMHG | HEIGHT: 67 IN | TEMPERATURE: 98 F | OXYGEN SATURATION: 98 % | BODY MASS INDEX: 27.94 KG/M2

## 2022-11-23 DIAGNOSIS — B34.9 VIRAL SYNDROME: Primary | ICD-10-CM

## 2022-11-23 DIAGNOSIS — I10 BENIGN ESSENTIAL HTN: ICD-10-CM

## 2022-11-23 DIAGNOSIS — R73.9 BLOOD GLUCOSE ELEVATED: ICD-10-CM

## 2022-11-23 DIAGNOSIS — R73.01 IFG (IMPAIRED FASTING GLUCOSE): ICD-10-CM

## 2022-11-23 PROCEDURE — 99214 OFFICE O/P EST MOD 30 MIN: CPT | Performed by: INTERNAL MEDICINE

## 2022-11-23 PROCEDURE — 1111F DSCHRG MED/CURRENT MED MERGE: CPT | Performed by: INTERNAL MEDICINE

## 2022-11-23 RX ORDER — BENZONATATE 200 MG/1
200 CAPSULE ORAL 3 TIMES DAILY PRN
Qty: 30 CAPSULE | Refills: 0 | Status: SHIPPED | OUTPATIENT
Start: 2022-11-23

## 2022-11-28 ENCOUNTER — OFFICE VISIT (OUTPATIENT)
Facility: LOCATION | Age: 82
End: 2022-11-28
Payer: MEDICARE

## 2022-11-28 DIAGNOSIS — H61.23 BILATERAL IMPACTED CERUMEN: Primary | ICD-10-CM

## 2022-11-28 PROCEDURE — 92504 EAR MICROSCOPY EXAMINATION: CPT | Performed by: OTOLARYNGOLOGY

## 2022-11-28 PROCEDURE — 99213 OFFICE O/P EST LOW 20 MIN: CPT | Performed by: OTOLARYNGOLOGY

## 2022-12-29 ENCOUNTER — LAB ENCOUNTER (OUTPATIENT)
Dept: LAB | Age: 82
End: 2022-12-29
Attending: INTERNAL MEDICINE
Payer: MEDICARE

## 2022-12-29 DIAGNOSIS — R73.01 IFG (IMPAIRED FASTING GLUCOSE): ICD-10-CM

## 2022-12-29 DIAGNOSIS — I10 BENIGN ESSENTIAL HTN: ICD-10-CM

## 2022-12-29 DIAGNOSIS — R73.9 BLOOD GLUCOSE ELEVATED: ICD-10-CM

## 2022-12-29 LAB
ALBUMIN SERPL-MCNC: 3.7 G/DL (ref 3.4–5)
ALBUMIN/GLOB SERPL: 1 {RATIO} (ref 1–2)
ALP LIVER SERPL-CCNC: 65 U/L
ALT SERPL-CCNC: 19 U/L
ANION GAP SERPL CALC-SCNC: 6 MMOL/L (ref 0–18)
AST SERPL-CCNC: 15 U/L (ref 15–37)
BILIRUB SERPL-MCNC: 0.5 MG/DL (ref 0.1–2)
BUN BLD-MCNC: 46 MG/DL (ref 7–18)
CALCIUM BLD-MCNC: 9.4 MG/DL (ref 8.5–10.1)
CHLORIDE SERPL-SCNC: 112 MMOL/L (ref 98–112)
CO2 SERPL-SCNC: 27 MMOL/L (ref 21–32)
CREAT BLD-MCNC: 1.42 MG/DL
EST. AVERAGE GLUCOSE BLD GHB EST-MCNC: 111 MG/DL (ref 68–126)
FASTING STATUS PATIENT QL REPORTED: NO
GFR SERPLBLD BASED ON 1.73 SQ M-ARVRAT: 50 ML/MIN/1.73M2 (ref 60–?)
GLOBULIN PLAS-MCNC: 3.7 G/DL (ref 2.8–4.4)
GLUCOSE BLD-MCNC: 129 MG/DL (ref 70–99)
HBA1C MFR BLD: 5.5 % (ref ?–5.7)
OSMOLALITY SERPL CALC.SUM OF ELEC: 314 MOSM/KG (ref 275–295)
POTASSIUM SERPL-SCNC: 4.2 MMOL/L (ref 3.5–5.1)
PROT SERPL-MCNC: 7.4 G/DL (ref 6.4–8.2)
SODIUM SERPL-SCNC: 145 MMOL/L (ref 136–145)

## 2022-12-29 PROCEDURE — 83036 HEMOGLOBIN GLYCOSYLATED A1C: CPT

## 2022-12-29 PROCEDURE — 80053 COMPREHEN METABOLIC PANEL: CPT

## 2022-12-29 PROCEDURE — 36415 COLL VENOUS BLD VENIPUNCTURE: CPT

## 2022-12-29 NOTE — CONSULTS
Connie Singer MD    Department of Gastroenterology  [facility] 7301 Patient Status:  Observation    1940 MRN [id number]   [facility] 6NE-A Attending Martha Hough MD   [facility]-Administered Medications:   •  Meropenem (MERREM) 500 mg in sodium chloride 0.9% 100 mL IVPB-MBP, 500 mg, Intravenous, Q12H  •  potassium chloride IVPB premix 40 mEq, 40 mEq, Intravenous, Once  •  Pantoprazole Sodium (PROTONIX) 40 mg in Sodium Chl urine, incontinence, kidney failure. Psychosocial: noncontributory  Neuro: no tremor, dysarthria, gait disturbance  Skin: Denies severe itching, unusual moles, rash, flushing, change in hair or nails.   Bone/joint: No joint pain or inflammation  Heme/Lymph Additional Area 3 Location: orbic oris lower nsaids. Pt is currently on pressors for hypotension, does not appear to be actively bleeding, had two brown stools today. Plan:  1. Continue protonix 40 mg IV bid. 2. Monitor H and H every 8 hours. 3. Check iron studies.   4. Ice chips and sips with me

## 2023-01-05 ENCOUNTER — TELEPHONE (OUTPATIENT)
Dept: SURGERY | Facility: CLINIC | Age: 83
End: 2023-01-05

## 2023-01-05 NOTE — TELEPHONE ENCOUNTER
LMTCB, need to move patient's appointment to the afternoon. GEOFFREY's if patient calls back please offer appointment in the afternoon, as Yesi Daniels is no longer seeing patients in the mornings on Thursdays.

## 2023-01-16 ENCOUNTER — HOSPITAL ENCOUNTER (OUTPATIENT)
Dept: ULTRASOUND IMAGING | Facility: HOSPITAL | Age: 83
End: 2023-01-16
Attending: INTERNAL MEDICINE

## 2023-01-16 DIAGNOSIS — Z13.9 ENCOUNTER FOR SCREENING: ICD-10-CM

## 2023-01-16 NOTE — PROGRESS NOTES
Date of Service 1/16/2023    Nimesh Albert  Date of Birth 8/31/1940    Patient Age: [de-identified]year old    PCP: Nadeem Mcbride MD  [address] [id number]    CARDIOLOGIST:  Dr. Catia Lino  [facility]    Consult Type  Type Scan/Screening: PV Screening        Left Carotid Artery: Normal  Right Carotid Artery: Normal       Body Mass Index  There is no height or weight on file to calculate BMI. Lipid Profile  Cholesterol: 143, done on 3/8/2022. HDL Cholesterol: 32, done on 3/8/2022. LDL Cholesterol: 81, done on 3/8/2022. TriGlycerides 175, done on 3/8/2022. Nurse Review  Risk factor information and results reviewed with Nurse: Yes    Recommended Follow Up:  Consult your physician regarding[de-identified]   Final Peripheral Vascular Stroke Screen Report; Discuss potential for Incidental Finding        Recommendations for Change:  Nutrition Changes: Low Saturated Fat;Low Fat Dairy; Increase Fiber     Cholesterol Modification   (goal of therapy depends upon your risk): Increase HDL to >45  Lower Triglycerides <150                 Repeat PV Screening: 3 Years       Raquel Recommended Resources:  Recommended Resources: Upcoming Classes, Medical Services and [facility]. Health. Raul Prado RN        Please Contact the Nurse Heart Line with any Questions or Concerns [phone].

## 2023-01-18 RX ORDER — METFORMIN HYDROCHLORIDE 750 MG/1
TABLET, EXTENDED RELEASE ORAL
Qty: 180 TABLET | Refills: 1 | Status: SHIPPED | OUTPATIENT
Start: 2023-01-18

## 2023-01-23 RX ORDER — OMEPRAZOLE 40 MG/1
CAPSULE, DELAYED RELEASE ORAL
Qty: 180 CAPSULE | Refills: 1 | Status: SHIPPED | OUTPATIENT
Start: 2023-01-23

## 2023-01-24 RX ORDER — FINASTERIDE 5 MG/1
5 TABLET, FILM COATED ORAL DAILY
Qty: 90 TABLET | Refills: 3 | Status: SHIPPED | OUTPATIENT
Start: 2023-01-24

## 2023-02-16 RX ORDER — GABAPENTIN 300 MG/1
CAPSULE ORAL
Qty: 270 CAPSULE | Refills: 0 | Status: SHIPPED | OUTPATIENT
Start: 2023-02-16

## 2023-02-16 NOTE — TELEPHONE ENCOUNTER
Last VISIT - 11/23/22 Viral sydrome    Last CPE - 5/6/22    Last REFILL -     GABAPENTIN 300 MG Oral Cap 270 capsule 0 11/21/2022     Last LABS - 12/29/22 cmp, a1c 10/4/22 cbc 3/8/22 lipid    Per PROTOCOL? None    Please Approve or Deny.

## 2023-03-08 ENCOUNTER — LAB ENCOUNTER (OUTPATIENT)
Dept: LAB | Age: 83
End: 2023-03-08
Attending: INTERNAL MEDICINE
Payer: MEDICARE

## 2023-03-08 DIAGNOSIS — I10 BENIGN HYPERTENSION: Primary | ICD-10-CM

## 2023-03-08 LAB
ALBUMIN SERPL-MCNC: 3.9 G/DL (ref 3.4–5)
ALBUMIN/GLOB SERPL: 1.1 {RATIO} (ref 1–2)
ALP LIVER SERPL-CCNC: 63 U/L
ALT SERPL-CCNC: 24 U/L
ANION GAP SERPL CALC-SCNC: 4 MMOL/L (ref 0–18)
AST SERPL-CCNC: 16 U/L (ref 15–37)
BILIRUB SERPL-MCNC: 0.7 MG/DL (ref 0.1–2)
BUN BLD-MCNC: 38 MG/DL (ref 7–18)
CALCIUM BLD-MCNC: 9.3 MG/DL (ref 8.5–10.1)
CHLORIDE SERPL-SCNC: 110 MMOL/L (ref 98–112)
CO2 SERPL-SCNC: 25 MMOL/L (ref 21–32)
CREAT BLD-MCNC: 1.47 MG/DL
FASTING STATUS PATIENT QL REPORTED: NO
GFR SERPLBLD BASED ON 1.73 SQ M-ARVRAT: 48 ML/MIN/1.73M2 (ref 60–?)
GLOBULIN PLAS-MCNC: 3.5 G/DL (ref 2.8–4.4)
GLUCOSE BLD-MCNC: 114 MG/DL (ref 70–99)
OSMOLALITY SERPL CALC.SUM OF ELEC: 298 MOSM/KG (ref 275–295)
POTASSIUM SERPL-SCNC: 3.9 MMOL/L (ref 3.5–5.1)
PROT SERPL-MCNC: 7.4 G/DL (ref 6.4–8.2)
SODIUM SERPL-SCNC: 139 MMOL/L (ref 136–145)

## 2023-03-08 PROCEDURE — 36415 COLL VENOUS BLD VENIPUNCTURE: CPT

## 2023-03-08 PROCEDURE — 80053 COMPREHEN METABOLIC PANEL: CPT

## 2023-03-20 ENCOUNTER — TELEPHONE (OUTPATIENT)
Dept: SURGERY | Facility: CLINIC | Age: 83
End: 2023-03-20

## 2023-03-20 ENCOUNTER — TELEPHONE (OUTPATIENT)
Dept: INTERNAL MEDICINE CLINIC | Facility: CLINIC | Age: 83
End: 2023-03-20

## 2023-03-20 ENCOUNTER — HOSPITAL ENCOUNTER (OUTPATIENT)
Dept: ULTRASOUND IMAGING | Age: 83
Discharge: HOME | End: 2023-03-20
Attending: UROLOGY
Payer: MEDICARE

## 2023-03-20 ENCOUNTER — PATIENT MESSAGE (OUTPATIENT)
Dept: SURGERY | Facility: CLINIC | Age: 83
End: 2023-03-20

## 2023-03-20 DIAGNOSIS — N50.89 SCROTAL SWELLING: Primary | ICD-10-CM

## 2023-03-20 DIAGNOSIS — N50.89 SCROTAL SWELLING: ICD-10-CM

## 2023-03-20 PROCEDURE — 93975 VASCULAR STUDY: CPT | Performed by: UROLOGY

## 2023-03-20 PROCEDURE — 76870 US EXAM SCROTUM: CPT | Performed by: UROLOGY

## 2023-03-20 NOTE — TELEPHONE ENCOUNTER
Received progress notes from [address] [place] [address]. Abstracted labs. Put in TB bin for review.

## 2023-03-20 NOTE — TELEPHONE ENCOUNTER
Called patient and confirmed his full name and . The patient stated that he was on vacation last week and during that time his testicles swelled up to about twice their normal size. This happened over several days. Pt states he has no pain, the testicles are just irritable when he squeezes them. He is still able to straight cath, no problems with urine output. Pt stated that he flew home yesterday and sitting on the airplane for 5 hours increased the swelling. I discussed this with Dr. Yaima Aceves who wants the patient to follow up with him this Friday and get a scrotal US prior to coming. Called patient back and asked him to call [facility] Sched[facility] to make an appt for a scrotal US. Made an appt for the patient to see Dr. Yaima Aceves on Friday, 3/24 at [phone]. I explained to the patient that if anything changes and he is having sudden pain or his scrotum become cyanotic (blue color) to go to the ER immediately. The patient verbalized his understanding and stated he had no further questions.

## 2023-03-23 ENCOUNTER — TELEPHONE (OUTPATIENT)
Dept: SURGERY | Facility: CLINIC | Age: 83
End: 2023-03-23

## 2023-03-23 NOTE — TELEPHONE ENCOUNTER
-s/w pt; identity verified with name and   - Read K[facility] message to pt and pt acknowledged understanding of the information and agreed to p/u rx asap. Pt had scheduled an appt for tomorrow to discuss testicular swelling, but also has his yearly appt with Dr. Susy Knowles on 4/3, so I suggested we cancel tomorrow's appt, and he keeps the 4/3 appt, and in the meantime take the abx.    - Encounter complete    ----- Message from Cordell Olsen MD sent at 3/23/2023 12:22 PM CDT -----  Urology staff please call this patient. Let him know that I reviewed his recent scrotal ultrasound. This demonstrates findings that could be suggestive of right testicular orchitis. In light of this finding I would recommend starting him on antibiotics with ciprofloxacin 500 mg twice daily for 10 days. A prescription has been sent to his pharmacy. Please asked the patient to start taking it as soon as possible.

## 2023-03-23 NOTE — TELEPHONE ENCOUNTER
----- Message from Aniyah Ma MD sent at 3/23/2023 12:22 PM CDT -----  Urology staff please call this patient. Let him know that I reviewed his recent scrotal ultrasound. This demonstrates findings that could be suggestive of right testicular orchitis. In light of this finding I would recommend starting him on antibiotics with ciprofloxacin 500 mg twice daily for 10 days. A prescription has been sent to his pharmacy. Please asked the patient to start taking it as soon as possible.

## 2023-03-27 ENCOUNTER — TELEPHONE (OUTPATIENT)
Dept: INTERNAL MEDICINE CLINIC | Facility: CLINIC | Age: 83
End: 2023-03-27

## 2023-04-03 ENCOUNTER — OFFICE VISIT (OUTPATIENT)
Dept: SURGERY | Facility: CLINIC | Age: 83
End: 2023-04-03

## 2023-04-03 DIAGNOSIS — N40.1 BENIGN PROSTATIC HYPERPLASIA WITH LOWER URINARY TRACT SYMPTOMS, SYMPTOM DETAILS UNSPECIFIED: ICD-10-CM

## 2023-04-03 DIAGNOSIS — N50.89 SCROTAL SWELLING: Primary | ICD-10-CM

## 2023-04-03 DIAGNOSIS — R33.9 URINARY RETENTION: ICD-10-CM

## 2023-04-03 PROCEDURE — 99214 OFFICE O/P EST MOD 30 MIN: CPT | Performed by: UROLOGY

## 2023-04-11 RX ORDER — HYDROCHLOROTHIAZIDE 25 MG/1
TABLET ORAL
Qty: 90 TABLET | Refills: 1 | Status: SHIPPED | OUTPATIENT
Start: 2023-04-11

## 2023-04-19 ENCOUNTER — TELEPHONE (OUTPATIENT)
Dept: SURGERY | Facility: CLINIC | Age: 83
End: 2023-04-19

## 2023-04-19 NOTE — TELEPHONE ENCOUNTER
[employer] [facility] states faxed over order on Monday needs status update on completion please advise       Fax # [phone]

## 2023-04-19 NOTE — TELEPHONE ENCOUNTER
Received fax from [address] [place] [address] for prescription for medical supplies. Will place on MD's desk.

## 2023-04-20 NOTE — TELEPHONE ENCOUNTER
Order signed by MD. Traci Baez to [employer] [facility]. Will send copy to scanning.

## 2023-05-17 DIAGNOSIS — E78.5 DYSLIPIDEMIA: ICD-10-CM

## 2023-05-17 NOTE — TELEPHONE ENCOUNTER
Last VISIT - 11/23/22 Urgent care f/u for Patient with pre dm, HTN, HL, CKD. Last CPE - 5/6/22     Last REFILL -     SIMVASTATIN 40 MG Oral Tab 90 tablet 3 5/27/2022     Last LABS - 3/8/23 cmp 12/29/22 a1c 10/26/22 cbc, and bmp    Per PROTOCOL? Failed    Please Approve or Deny.

## 2023-05-18 RX ORDER — SIMVASTATIN 40 MG
40 TABLET ORAL NIGHTLY
Qty: 90 TABLET | Refills: 0 | Status: SHIPPED | OUTPATIENT
Start: 2023-05-18

## 2023-05-22 NOTE — TELEPHONE ENCOUNTER
Last VISIT - 11/23/22 f/u for Pre dm and BP    Last CPE - 5/6/22    Last REFILL -     GABAPENTIN 300 MG Oral Cap 270 capsule 0 2/16/2023     Last LABS - 3/8/23 cmp    Future Appointments   Date Time Provider Sadia Webb   4/8/2024  8:20 AM Cruz Anton MD [facility]     Per PROTOCOL? None    Please Approve or Deny.

## 2023-05-23 RX ORDER — GABAPENTIN 300 MG/1
CAPSULE ORAL
Qty: 270 CAPSULE | Refills: 0 | Status: SHIPPED | OUTPATIENT
Start: 2023-05-23

## 2023-06-26 RX ORDER — OMEPRAZOLE 40 MG/1
CAPSULE, DELAYED RELEASE ORAL
Qty: 180 CAPSULE | Refills: 1 | Status: SHIPPED | OUTPATIENT
Start: 2023-06-26

## 2023-06-27 ENCOUNTER — LAB ENCOUNTER (OUTPATIENT)
Dept: LAB | Age: 83
End: 2023-06-27
Attending: INTERNAL MEDICINE
Payer: MEDICARE

## 2023-06-27 DIAGNOSIS — E78.5 DYSLIPIDEMIA: ICD-10-CM

## 2023-06-27 LAB
CHOLEST SERPL-MCNC: 111 MG/DL (ref ?–200)
FASTING PATIENT LIPID ANSWER: YES
HDLC SERPL-MCNC: 30 MG/DL (ref 40–59)
LDLC SERPL CALC-MCNC: 59 MG/DL (ref ?–100)
NONHDLC SERPL-MCNC: 81 MG/DL (ref ?–130)
TRIGL SERPL-MCNC: 119 MG/DL (ref 30–149)
VLDLC SERPL CALC-MCNC: 18 MG/DL (ref 0–30)

## 2023-06-27 PROCEDURE — 36415 COLL VENOUS BLD VENIPUNCTURE: CPT

## 2023-06-27 PROCEDURE — 80061 LIPID PANEL: CPT

## 2023-06-27 NOTE — TELEPHONE ENCOUNTER
Detail Level: Detailed I s/w pt's spouse and determined that pt was supposed to get a shipment of his 14 FR straight tip Bard red rubber caths and she was told by the rep at [address] [place] [address] that the orders did not ship because that cath is on back order and they cannot tell her when they will be available. She states that pt has only 1 box of caths left and uses 3 per day. I explained that I can provide some red rubber caths but we use this type of cath regularly for our procedures so I cant give her too many but I can provide some of the silicone 14 FR straight tip caths . She was thankful and I told her that I will leave them for her to p/u at the [facility] .

## 2023-06-29 ENCOUNTER — TELEPHONE (OUTPATIENT)
Dept: SURGERY | Facility: CLINIC | Age: 83
End: 2023-06-29

## 2023-06-29 DIAGNOSIS — N40.1 BENIGN PROSTATIC HYPERPLASIA WITH LOWER URINARY TRACT SYMPTOMS, SYMPTOM DETAILS UNSPECIFIED: Primary | ICD-10-CM

## 2023-06-29 DIAGNOSIS — N39.0 RECURRENT UTI: ICD-10-CM

## 2023-06-30 ENCOUNTER — TELEPHONE (OUTPATIENT)
Dept: SURGERY | Facility: CLINIC | Age: 83
End: 2023-06-30

## 2023-06-30 ENCOUNTER — LAB ENCOUNTER (OUTPATIENT)
Dept: LAB | Age: 83
End: 2023-06-30
Attending: UROLOGY
Payer: MEDICARE

## 2023-06-30 ENCOUNTER — PATIENT MESSAGE (OUTPATIENT)
Dept: SURGERY | Facility: CLINIC | Age: 83
End: 2023-06-30

## 2023-06-30 DIAGNOSIS — N39.0 RECURRENT UTI: ICD-10-CM

## 2023-06-30 DIAGNOSIS — N39.0 RECURRENT UTI: Primary | ICD-10-CM

## 2023-06-30 LAB
BILIRUB UR QL STRIP.AUTO: NEGATIVE
COLOR UR AUTO: YELLOW
GLUCOSE UR STRIP.AUTO-MCNC: NEGATIVE MG/DL
KETONES UR STRIP.AUTO-MCNC: NEGATIVE MG/DL
NITRITE UR QL STRIP.AUTO: NEGATIVE
PH UR STRIP.AUTO: 5 [PH] (ref 5–8)
PROT UR STRIP.AUTO-MCNC: 100 MG/DL
RBC #/AREA URNS AUTO: >10 /HPF
RBC UR QL AUTO: NEGATIVE
SP GR UR STRIP.AUTO: 1.01 (ref 1–1.03)
UROBILINOGEN UR STRIP.AUTO-MCNC: <2 MG/DL
WBC #/AREA URNS AUTO: >50 /HPF
WBC CLUMPS UR QL AUTO: PRESENT /HPF

## 2023-06-30 PROCEDURE — 87186 SC STD MICRODIL/AGAR DIL: CPT

## 2023-06-30 PROCEDURE — 87086 URINE CULTURE/COLONY COUNT: CPT

## 2023-06-30 PROCEDURE — 87077 CULTURE AEROBIC IDENTIFY: CPT

## 2023-06-30 PROCEDURE — 81001 URINALYSIS AUTO W/SCOPE: CPT

## 2023-06-30 RX ORDER — FINASTERIDE 5 MG/1
5 TABLET, FILM COATED ORAL DAILY
Qty: 90 TABLET | Refills: 3 | Status: SHIPPED | OUTPATIENT
Start: 2023-06-30

## 2023-07-03 RX ORDER — SULFAMETHOXAZOLE AND TRIMETHOPRIM 800; 160 MG/1; MG/1
1 TABLET ORAL 2 TIMES DAILY
Qty: 14 TABLET | Refills: 0 | Status: SHIPPED | OUTPATIENT
Start: 2023-07-03 | End: 2023-07-10

## 2023-07-05 ENCOUNTER — LAB ENCOUNTER (OUTPATIENT)
Dept: LAB | Age: 83
End: 2023-07-05
Attending: INTERNAL MEDICINE
Payer: MEDICARE

## 2023-07-05 DIAGNOSIS — M79.606 PAIN OF LOWER EXTREMITY, UNSPECIFIED LATERALITY: ICD-10-CM

## 2023-07-05 DIAGNOSIS — R06.09 DOE (DYSPNEA ON EXERTION): ICD-10-CM

## 2023-07-05 LAB
ALBUMIN SERPL-MCNC: 3.8 G/DL (ref 3.4–5)
ALBUMIN/GLOB SERPL: 1.1 {RATIO} (ref 1–2)
ALP LIVER SERPL-CCNC: 60 U/L
ALT SERPL-CCNC: 20 U/L
ANION GAP SERPL CALC-SCNC: 9 MMOL/L (ref 0–18)
AST SERPL-CCNC: 17 U/L (ref 15–37)
BASOPHILS # BLD AUTO: 0.07 X10(3) UL (ref 0–0.2)
BASOPHILS NFR BLD AUTO: 0.8 %
BILIRUB SERPL-MCNC: 0.5 MG/DL (ref 0.1–2)
BUN BLD-MCNC: 53 MG/DL (ref 7–18)
CALCIUM BLD-MCNC: 9.4 MG/DL (ref 8.5–10.1)
CHLORIDE SERPL-SCNC: 112 MMOL/L (ref 98–112)
CO2 SERPL-SCNC: 23 MMOL/L (ref 21–32)
CREAT BLD-MCNC: 1.81 MG/DL
EOSINOPHIL # BLD AUTO: 0.37 X10(3) UL (ref 0–0.7)
EOSINOPHIL NFR BLD AUTO: 4.4 %
ERYTHROCYTE [DISTWIDTH] IN BLOOD BY AUTOMATED COUNT: 15.8 %
FASTING STATUS PATIENT QL REPORTED: YES
GFR SERPLBLD BASED ON 1.73 SQ M-ARVRAT: 37 ML/MIN/1.73M2 (ref 60–?)
GLOBULIN PLAS-MCNC: 3.6 G/DL (ref 2.8–4.4)
GLUCOSE BLD-MCNC: 154 MG/DL (ref 70–99)
HCT VFR BLD AUTO: 34 %
HGB BLD-MCNC: 10.7 G/DL
IMM GRANULOCYTES # BLD AUTO: 0.03 X10(3) UL (ref 0–1)
IMM GRANULOCYTES NFR BLD: 0.4 %
LYMPHOCYTES # BLD AUTO: 1.86 X10(3) UL (ref 1–4)
LYMPHOCYTES NFR BLD AUTO: 21.9 %
MCH RBC QN AUTO: 20.4 PG (ref 26–34)
MCHC RBC AUTO-ENTMCNC: 31.5 G/DL (ref 31–37)
MCV RBC AUTO: 64.9 FL
MONOCYTES # BLD AUTO: 0.92 X10(3) UL (ref 0.1–1)
MONOCYTES NFR BLD AUTO: 10.8 %
NEUTROPHILS # BLD AUTO: 5.23 X10 (3) UL (ref 1.5–7.7)
NEUTROPHILS # BLD AUTO: 5.23 X10(3) UL (ref 1.5–7.7)
NEUTROPHILS NFR BLD AUTO: 61.7 %
OSMOLALITY SERPL CALC.SUM OF ELEC: 315 MOSM/KG (ref 275–295)
PLATELET # BLD AUTO: 232 10(3)UL (ref 150–450)
POTASSIUM SERPL-SCNC: 3.7 MMOL/L (ref 3.5–5.1)
PROT SERPL-MCNC: 7.4 G/DL (ref 6.4–8.2)
RBC # BLD AUTO: 5.24 X10(6)UL
SODIUM SERPL-SCNC: 144 MMOL/L (ref 136–145)
WBC # BLD AUTO: 8.5 X10(3) UL (ref 4–11)

## 2023-07-05 PROCEDURE — 80053 COMPREHEN METABOLIC PANEL: CPT

## 2023-07-05 PROCEDURE — 85025 COMPLETE CBC W/AUTO DIFF WBC: CPT

## 2023-07-05 PROCEDURE — 36415 COLL VENOUS BLD VENIPUNCTURE: CPT

## 2023-07-07 NOTE — PROGRESS NOTES
Progress Summary    Pt has attended 9 visits Physical Therapy.     Dx:      -Right leg pain   -Lumbar radicular pain       Authorized # of Visits:  No prior authorization is required per appointment notes         Next MD visit: none scheduled  Fall Risk: s resistance  -S/L 1-2 joint hip flexor stretch:  30 second hold x 3 reps B LE  -S/L gluteus medius with knee flexion heals together with manual resistanc -Therapeutic exercise:   Single leg press @ shuttle level 7.0 resistance 20 reps x 3 sets  -Standing ba injection.  -Aggravated factors:    -Standing still (such as going to bathroom); can ease sometimes with walking; sometimes zaps with walking  -Standing a long time (such as in the airport)  -Easing:  Sitting, laying down  Pt describes pain level:  -4/10 joint mobilization for symptom modulation with extension restoration as tolerated  -Anticipate goal achievement in 6-8 visits    Precautions:  None      PLAN OF CARE:    Goals:    (8 visits)  -Demonstrate appropriate performance with of home program and pr Was A Bandage Applied: Yes

## 2023-07-18 ENCOUNTER — OFFICE VISIT (OUTPATIENT)
Dept: INTERNAL MEDICINE CLINIC | Facility: CLINIC | Age: 83
End: 2023-07-18
Payer: MEDICARE

## 2023-07-18 VITALS
OXYGEN SATURATION: 99 % | SYSTOLIC BLOOD PRESSURE: 118 MMHG | TEMPERATURE: 97 F | WEIGHT: 173 LBS | RESPIRATION RATE: 16 BRPM | HEART RATE: 66 BPM | DIASTOLIC BLOOD PRESSURE: 78 MMHG | BODY MASS INDEX: 27.15 KG/M2 | HEIGHT: 67 IN

## 2023-07-18 DIAGNOSIS — D64.9 CHRONIC ANEMIA: ICD-10-CM

## 2023-07-18 DIAGNOSIS — M48.061 SPINAL STENOSIS OF LUMBAR REGION, UNSPECIFIED WHETHER NEUROGENIC CLAUDICATION PRESENT: ICD-10-CM

## 2023-07-18 DIAGNOSIS — N18.32 STAGE 3B CHRONIC KIDNEY DISEASE (HCC): Primary | ICD-10-CM

## 2023-07-18 DIAGNOSIS — R73.9 BLOOD GLUCOSE ELEVATED: ICD-10-CM

## 2023-07-18 PROCEDURE — 99214 OFFICE O/P EST MOD 30 MIN: CPT | Performed by: INTERNAL MEDICINE

## 2023-07-18 RX ORDER — CARVEDILOL 12.5 MG/1
TABLET ORAL
COMMUNITY
Start: 2023-02-22 | End: 2023-07-18

## 2023-07-18 RX ORDER — CARVEDILOL 6.25 MG/1
1 TABLET ORAL DAILY
COMMUNITY
Start: 2023-02-02 | End: 2023-07-18

## 2023-07-18 RX ORDER — CLOPIDOGREL BISULFATE 75 MG/1
TABLET ORAL
COMMUNITY
Start: 2022-10-01 | End: 2023-07-18

## 2023-07-18 RX ORDER — CARVEDILOL 25 MG/1
TABLET ORAL
COMMUNITY
Start: 2023-02-27

## 2023-07-18 RX ORDER — HYDROCHLOROTHIAZIDE 12.5 MG/1
12.5 CAPSULE, GELATIN COATED ORAL DAILY
Qty: 90 CAPSULE | Refills: 1 | Status: SHIPPED | OUTPATIENT
Start: 2023-07-18

## 2023-07-18 RX ORDER — METHYLPREDNISOLONE 4 MG/1
4 TABLET ORAL AS DIRECTED
COMMUNITY
Start: 2023-04-26

## 2023-07-18 RX ORDER — DIAZEPAM 5 MG/1
TABLET ORAL
COMMUNITY
Start: 2023-05-31 | End: 2023-07-18 | Stop reason: ALTCHOICE

## 2023-07-20 PROBLEM — D64.9 CHRONIC ANEMIA: Status: ACTIVE | Noted: 2023-07-20

## 2023-07-20 PROBLEM — N18.32 STAGE 3B CHRONIC KIDNEY DISEASE (HCC): Status: ACTIVE | Noted: 2023-07-20

## 2023-07-20 PROBLEM — R73.9 BLOOD GLUCOSE ELEVATED: Status: ACTIVE | Noted: 2023-07-20

## 2023-07-20 PROBLEM — M48.061 SPINAL STENOSIS OF LUMBAR REGION: Status: ACTIVE | Noted: 2023-07-20

## 2023-08-02 ENCOUNTER — TELEPHONE (OUTPATIENT)
Dept: INTERNAL MEDICINE CLINIC | Facility: CLINIC | Age: 83
End: 2023-08-02

## 2023-08-02 NOTE — TELEPHONE ENCOUNTER
Patient recently saw TB. Patient was in the ER yesterday at [facility]. Patient was admitted and BP was off the charts. The Cardiologists suspected a small heart attack. Patient is to get in to a Cardiologist ASAP and [place] has nothing until next [address] [facility]. Patient will be released from Hospital today. TB have a suggestion? ?

## 2023-08-02 NOTE — TELEPHONE ENCOUNTER
Documentation available in epic. Discharged today with cardiac monitor. They brushed off from Dr. Mavis Machado office so they will call [facility] for visit. They are out of town next week. Scheduled HFU with TB 8/16. They indicate he was told no restritions. ED warnings provided. They will call with concerns or questions. Patient states feeling ok, with wife at this time driving home. No concerns expressed. [place] discharged related to  CHF, NSTEMI, and cardiomyopathy.

## 2023-08-04 ENCOUNTER — TELEPHONE (OUTPATIENT)
Dept: SURGERY | Facility: CLINIC | Age: 83
End: 2023-08-04

## 2023-08-04 DIAGNOSIS — N39.0 FREQUENT UTI: ICD-10-CM

## 2023-08-04 DIAGNOSIS — N50.89 SCROTAL SWELLING: Primary | ICD-10-CM

## 2023-08-04 RX ORDER — CIPROFLOXACIN 500 MG/1
500 TABLET, FILM COATED ORAL 2 TIMES DAILY
Qty: 20 TABLET | Refills: 0 | Status: SHIPPED | OUTPATIENT
Start: 2023-08-04 | End: 2023-08-14

## 2023-08-04 NOTE — TELEPHONE ENCOUNTER
Patient requesting medication. States his \"scrotum is double the size from last time\".  Please advise

## 2023-08-04 NOTE — TELEPHONE ENCOUNTER
I received a call from [facility]. She stated that she is unable to fax us the urine culture results, but transferred me to the pharm RANDI MENDOZA. According to [address] the patient was positive for Enterobacter bacteria, which is sensitive to Fluoroquinolones. However, the patient had a prolonged QT on his EKG during his stay there, which does not allow for Fluoroquinolones to be used. He may need IV antibiotics. I explained this to Dr. Jodi Gates, who stated that at this point the patient should go to the ER to be treated for the UTI as well as be evaluated for the scrotal swelling. I called Cory [place] and explained this to her. She verbalized her understanding and will contact [facility] for further evaluation. I explained that the patient cannot take the Cipro that was ordered, as this could lead to a further prolonged Qtc and cause a dangerous irregular heart rhythm. Lyric Mireles verbalized her understanding and stated that she would not start the Cipro at this time. Encounter complete.

## 2023-08-04 NOTE — TELEPHONE ENCOUNTER
I checked the chart and the patient had seen Dr. Colonel Phillip in April for scrotal swelling. I spoke with Dr. Colonel Phillip who ordered a scrotal US, kidney and bladder US with PVR, a urine culture and Cipro 500 mg BID x 10 days. I then called the patient and his wife, Denice Zepeda, answered. I confirmed that she is on the verbal release form. Denice Zepeda told me that the patient had been admitted at [facility] 8/1 and was being treated for a UTI. I explained the tests Dr. Colonel Phillip had ordered for the patient. Denice Zepeda also stated that she and her  are supposed to leave the country on Sunday and would return the following Saturday. Per Dr. Colonel Phillip I explained that they should try to get the testing done before they leave, but definitely start on the Cipro now and stop the antibiotics prescribed by [place]. The wife checked with [facility] as we do not have any culture results in Care Everywhere, only the . According to [facility] they have the culture results and I called MARIAELENA Solano, per the wife's request. Juan Luis Guerra RN will fax us the culture results as soon as she gets a signed release and will also let us know what the recommendations are from the pharmacist for antibiotics. Waiting for urine culture results.

## 2023-08-15 DIAGNOSIS — E78.5 DYSLIPIDEMIA: ICD-10-CM

## 2023-08-16 ENCOUNTER — OFFICE VISIT (OUTPATIENT)
Dept: INTERNAL MEDICINE CLINIC | Facility: CLINIC | Age: 83
End: 2023-08-16
Payer: MEDICARE

## 2023-08-16 VITALS
WEIGHT: 171.19 LBS | HEART RATE: 84 BPM | RESPIRATION RATE: 16 BRPM | OXYGEN SATURATION: 96 % | HEIGHT: 66.54 IN | TEMPERATURE: 98 F | DIASTOLIC BLOOD PRESSURE: 56 MMHG | BODY MASS INDEX: 27.19 KG/M2 | SYSTOLIC BLOOD PRESSURE: 114 MMHG

## 2023-08-16 DIAGNOSIS — Z86.19 HISTORY OF SEPSIS: ICD-10-CM

## 2023-08-16 DIAGNOSIS — R19.7 DIARRHEA, UNSPECIFIED TYPE: ICD-10-CM

## 2023-08-16 DIAGNOSIS — I50.21 ACUTE SYSTOLIC HEART FAILURE (HCC): Primary | ICD-10-CM

## 2023-08-16 DIAGNOSIS — I10 BENIGN ESSENTIAL HTN: ICD-10-CM

## 2023-08-16 DIAGNOSIS — Z87.440 HISTORY OF UTI: ICD-10-CM

## 2023-08-16 DIAGNOSIS — N18.32 STAGE 3B CHRONIC KIDNEY DISEASE (HCC): ICD-10-CM

## 2023-08-16 PROCEDURE — 1126F AMNT PAIN NOTED NONE PRSNT: CPT | Performed by: INTERNAL MEDICINE

## 2023-08-16 PROCEDURE — 1111F DSCHRG MED/CURRENT MED MERGE: CPT | Performed by: INTERNAL MEDICINE

## 2023-08-16 PROCEDURE — 99215 OFFICE O/P EST HI 40 MIN: CPT | Performed by: INTERNAL MEDICINE

## 2023-08-16 RX ORDER — SIMVASTATIN 40 MG
40 TABLET ORAL NIGHTLY
Qty: 90 TABLET | Refills: 0 | Status: SHIPPED | OUTPATIENT
Start: 2023-08-16

## 2023-08-16 RX ORDER — BUMETANIDE 1 MG/1
1 TABLET ORAL DAILY
COMMUNITY
Start: 2023-08-02

## 2023-08-16 RX ORDER — SACUBITRIL AND VALSARTAN 24; 26 MG/1; MG/1
1 TABLET, FILM COATED ORAL 2 TIMES DAILY
Qty: 60 TABLET | Refills: 5 | COMMUNITY
Start: 2023-08-16

## 2023-08-16 RX ORDER — CARVEDILOL 6.25 MG/1
6.25 TABLET ORAL 2 TIMES DAILY WITH MEALS
Qty: 60 TABLET | Refills: 5 | COMMUNITY
Start: 2023-08-16

## 2023-08-21 RX ORDER — GABAPENTIN 300 MG/1
CAPSULE ORAL
Qty: 270 CAPSULE | Refills: 0 | Status: SHIPPED | OUTPATIENT
Start: 2023-08-21

## 2023-08-26 ENCOUNTER — LAB ENCOUNTER (OUTPATIENT)
Dept: LAB | Age: 83
End: 2023-08-26
Attending: INTERNAL MEDICINE
Payer: MEDICARE

## 2023-08-26 DIAGNOSIS — R19.7 DIARRHEA, UNSPECIFIED TYPE: ICD-10-CM

## 2023-08-26 PROCEDURE — 87493 C DIFF AMPLIFIED PROBE: CPT

## 2023-08-27 ENCOUNTER — PATIENT MESSAGE (OUTPATIENT)
Dept: INTERNAL MEDICINE CLINIC | Facility: CLINIC | Age: 83
End: 2023-08-27

## 2023-08-27 LAB — C DIFF TOX B STL QL: NEGATIVE

## 2023-08-29 ENCOUNTER — LAB ENCOUNTER (OUTPATIENT)
Dept: LAB | Age: 83
End: 2023-08-29
Attending: INTERNAL MEDICINE
Payer: MEDICARE

## 2023-08-29 DIAGNOSIS — R19.7 DIARRHEA, UNSPECIFIED TYPE: ICD-10-CM

## 2023-08-29 LAB
ALBUMIN SERPL-MCNC: 3.6 G/DL (ref 3.4–5)
ALBUMIN/GLOB SERPL: 0.9 {RATIO} (ref 1–2)
ALP LIVER SERPL-CCNC: 66 U/L
ALT SERPL-CCNC: 24 U/L
ANION GAP SERPL CALC-SCNC: 3 MMOL/L (ref 0–18)
AST SERPL-CCNC: 16 U/L (ref 15–37)
BASOPHILS # BLD AUTO: 0.06 X10(3) UL (ref 0–0.2)
BASOPHILS NFR BLD AUTO: 0.7 %
BILIRUB SERPL-MCNC: 0.6 MG/DL (ref 0.1–2)
BUN BLD-MCNC: 41 MG/DL (ref 7–18)
CALCIUM BLD-MCNC: 8.9 MG/DL (ref 8.5–10.1)
CHLORIDE SERPL-SCNC: 111 MMOL/L (ref 98–112)
CO2 SERPL-SCNC: 27 MMOL/L (ref 21–32)
CREAT BLD-MCNC: 1.6 MG/DL
EGFRCR SERPLBLD CKD-EPI 2021: 43 ML/MIN/1.73M2 (ref 60–?)
EOSINOPHIL # BLD AUTO: 0.4 X10(3) UL (ref 0–0.7)
EOSINOPHIL NFR BLD AUTO: 4.6 %
ERYTHROCYTE [DISTWIDTH] IN BLOOD BY AUTOMATED COUNT: 15.1 %
FASTING STATUS PATIENT QL REPORTED: NO
GLOBULIN PLAS-MCNC: 3.8 G/DL (ref 2.8–4.4)
GLUCOSE BLD-MCNC: 106 MG/DL (ref 70–99)
HCT VFR BLD AUTO: 33 %
HGB BLD-MCNC: 10 G/DL
IMM GRANULOCYTES # BLD AUTO: 0.03 X10(3) UL (ref 0–1)
IMM GRANULOCYTES NFR BLD: 0.3 %
LYMPHOCYTES # BLD AUTO: 1.97 X10(3) UL (ref 1–4)
LYMPHOCYTES NFR BLD AUTO: 22.4 %
MCH RBC QN AUTO: 20.2 PG (ref 26–34)
MCHC RBC AUTO-ENTMCNC: 30.3 G/DL (ref 31–37)
MCV RBC AUTO: 66.8 FL
MONOCYTES # BLD AUTO: 1.02 X10(3) UL (ref 0.1–1)
MONOCYTES NFR BLD AUTO: 11.6 %
NEUTROPHILS # BLD AUTO: 5.3 X10 (3) UL (ref 1.5–7.7)
NEUTROPHILS # BLD AUTO: 5.3 X10(3) UL (ref 1.5–7.7)
NEUTROPHILS NFR BLD AUTO: 60.4 %
OSMOLALITY SERPL CALC.SUM OF ELEC: 303 MOSM/KG (ref 275–295)
PLATELET # BLD AUTO: 208 10(3)UL (ref 150–450)
POTASSIUM SERPL-SCNC: 4.1 MMOL/L (ref 3.5–5.1)
PROT SERPL-MCNC: 7.4 G/DL (ref 6.4–8.2)
RBC # BLD AUTO: 4.94 X10(6)UL
SODIUM SERPL-SCNC: 141 MMOL/L (ref 136–145)
WBC # BLD AUTO: 8.8 X10(3) UL (ref 4–11)

## 2023-08-29 PROCEDURE — 36415 COLL VENOUS BLD VENIPUNCTURE: CPT

## 2023-08-29 PROCEDURE — 85025 COMPLETE CBC W/AUTO DIFF WBC: CPT

## 2023-08-29 PROCEDURE — 80053 COMPREHEN METABOLIC PANEL: CPT

## 2023-08-30 ENCOUNTER — TELEPHONE (OUTPATIENT)
Dept: SURGERY | Facility: CLINIC | Age: 83
End: 2023-08-30

## 2023-09-11 ENCOUNTER — TELEPHONE (OUTPATIENT)
Dept: INTERNAL MEDICINE CLINIC | Facility: CLINIC | Age: 83
End: 2023-09-11

## 2023-09-12 ENCOUNTER — PATIENT MESSAGE (OUTPATIENT)
Dept: INTERNAL MEDICINE CLINIC | Facility: CLINIC | Age: 83
End: 2023-09-12

## 2023-09-13 ENCOUNTER — HOSPITAL ENCOUNTER (OUTPATIENT)
Dept: ULTRASOUND IMAGING | Age: 83
Discharge: HOME | End: 2023-09-13
Attending: UROLOGY
Payer: MEDICARE

## 2023-09-13 DIAGNOSIS — N50.89 SCROTAL SWELLING: ICD-10-CM

## 2023-09-13 DIAGNOSIS — N39.0 FREQUENT UTI: ICD-10-CM

## 2023-09-13 PROCEDURE — 93975 VASCULAR STUDY: CPT | Performed by: UROLOGY

## 2023-09-13 PROCEDURE — 76770 US EXAM ABDO BACK WALL COMP: CPT | Performed by: UROLOGY

## 2023-09-13 PROCEDURE — 76870 US EXAM SCROTUM: CPT | Performed by: UROLOGY

## 2023-09-14 ENCOUNTER — LAB ENCOUNTER (OUTPATIENT)
Dept: LAB | Age: 83
End: 2023-09-14
Attending: INTERNAL MEDICINE
Payer: MEDICARE

## 2023-09-14 DIAGNOSIS — N39.0 FREQUENT UTI: ICD-10-CM

## 2023-09-14 DIAGNOSIS — Z01.818 PREOP EXAMINATION: Primary | ICD-10-CM

## 2023-09-14 LAB
ANION GAP SERPL CALC-SCNC: 6 MMOL/L (ref 0–18)
BASOPHILS # BLD AUTO: 0.04 X10(3) UL (ref 0–0.2)
BASOPHILS NFR BLD AUTO: 0.6 %
BUN BLD-MCNC: 38 MG/DL (ref 7–18)
CALCIUM BLD-MCNC: 8.6 MG/DL (ref 8.5–10.1)
CHLORIDE SERPL-SCNC: 112 MMOL/L (ref 98–112)
CO2 SERPL-SCNC: 25 MMOL/L (ref 21–32)
CREAT BLD-MCNC: 1.61 MG/DL
EGFRCR SERPLBLD CKD-EPI 2021: 43 ML/MIN/1.73M2 (ref 60–?)
EOSINOPHIL # BLD AUTO: 0.26 X10(3) UL (ref 0–0.7)
EOSINOPHIL NFR BLD AUTO: 3.9 %
ERYTHROCYTE [DISTWIDTH] IN BLOOD BY AUTOMATED COUNT: 14.5 %
FASTING STATUS PATIENT QL REPORTED: NO
GLUCOSE BLD-MCNC: 126 MG/DL (ref 70–99)
HCT VFR BLD AUTO: 27.9 %
HGB BLD-MCNC: 8.5 G/DL
IMM GRANULOCYTES # BLD AUTO: 0.04 X10(3) UL (ref 0–1)
IMM GRANULOCYTES NFR BLD: 0.6 %
LYMPHOCYTES # BLD AUTO: 1.44 X10(3) UL (ref 1–4)
LYMPHOCYTES NFR BLD AUTO: 21.7 %
MCH RBC QN AUTO: 20 PG (ref 26–34)
MCHC RBC AUTO-ENTMCNC: 30.5 G/DL (ref 31–37)
MCV RBC AUTO: 65.6 FL
MONOCYTES # BLD AUTO: 0.58 X10(3) UL (ref 0.1–1)
MONOCYTES NFR BLD AUTO: 8.7 %
NEUTROPHILS # BLD AUTO: 4.29 X10 (3) UL (ref 1.5–7.7)
NEUTROPHILS # BLD AUTO: 4.29 X10(3) UL (ref 1.5–7.7)
NEUTROPHILS NFR BLD AUTO: 64.5 %
OSMOLALITY SERPL CALC.SUM OF ELEC: 307 MOSM/KG (ref 275–295)
PLATELET # BLD AUTO: 249 10(3)UL (ref 150–450)
POTASSIUM SERPL-SCNC: 3.7 MMOL/L (ref 3.5–5.1)
RBC # BLD AUTO: 4.25 X10(6)UL
SODIUM SERPL-SCNC: 143 MMOL/L (ref 136–145)
WBC # BLD AUTO: 6.7 X10(3) UL (ref 4–11)

## 2023-09-14 PROCEDURE — 80048 BASIC METABOLIC PNL TOTAL CA: CPT

## 2023-09-14 PROCEDURE — 36415 COLL VENOUS BLD VENIPUNCTURE: CPT

## 2023-09-14 PROCEDURE — 85025 COMPLETE CBC W/AUTO DIFF WBC: CPT

## 2023-09-15 ENCOUNTER — TELEPHONE (OUTPATIENT)
Dept: SURGERY | Facility: CLINIC | Age: 83
End: 2023-09-15

## 2023-09-19 ENCOUNTER — OFFICE VISIT (OUTPATIENT)
Dept: INTERNAL MEDICINE CLINIC | Facility: CLINIC | Age: 83
End: 2023-09-19
Payer: MEDICARE

## 2023-09-19 VITALS
BODY MASS INDEX: 27.62 KG/M2 | DIASTOLIC BLOOD PRESSURE: 70 MMHG | SYSTOLIC BLOOD PRESSURE: 140 MMHG | OXYGEN SATURATION: 97 % | WEIGHT: 176 LBS | HEIGHT: 67 IN | RESPIRATION RATE: 18 BRPM | HEART RATE: 82 BPM

## 2023-09-19 DIAGNOSIS — R79.89 ELEVATED SERUM CREATININE: ICD-10-CM

## 2023-09-19 DIAGNOSIS — N39.0 COMPLICATED UTI (URINARY TRACT INFECTION): Primary | ICD-10-CM

## 2023-09-19 LAB
BILIRUB UR QL STRIP.AUTO: NEGATIVE
COLOR UR AUTO: YELLOW
GLUCOSE UR STRIP.AUTO-MCNC: NORMAL MG/DL
HYALINE CASTS #/AREA URNS AUTO: PRESENT /LPF
KETONES UR STRIP.AUTO-MCNC: NEGATIVE MG/DL
LEUKOCYTE ESTERASE UR QL STRIP.AUTO: 500
NITRITE UR QL STRIP.AUTO: NEGATIVE
PH UR STRIP.AUTO: 5.5 [PH] (ref 5–8)
PROT UR STRIP.AUTO-MCNC: 70 MG/DL
RBC UR QL AUTO: NEGATIVE
SP GR UR STRIP.AUTO: 1.01 (ref 1–1.03)
UROBILINOGEN UR STRIP.AUTO-MCNC: NORMAL MG/DL
WBC #/AREA URNS AUTO: >50 /HPF
WBC CLUMPS UR QL AUTO: PRESENT /HPF

## 2023-09-19 PROCEDURE — 99214 OFFICE O/P EST MOD 30 MIN: CPT | Performed by: PHYSICIAN ASSISTANT

## 2023-09-19 PROCEDURE — 87184 SC STD DISK METHOD PER PLATE: CPT | Performed by: PHYSICIAN ASSISTANT

## 2023-09-19 PROCEDURE — 87086 URINE CULTURE/COLONY COUNT: CPT | Performed by: PHYSICIAN ASSISTANT

## 2023-09-19 PROCEDURE — 87077 CULTURE AEROBIC IDENTIFY: CPT | Performed by: PHYSICIAN ASSISTANT

## 2023-09-19 PROCEDURE — 81001 URINALYSIS AUTO W/SCOPE: CPT | Performed by: PHYSICIAN ASSISTANT

## 2023-09-19 PROCEDURE — 87186 SC STD MICRODIL/AGAR DIL: CPT | Performed by: PHYSICIAN ASSISTANT

## 2023-10-03 ENCOUNTER — OFFICE VISIT (OUTPATIENT)
Dept: SURGERY | Facility: CLINIC | Age: 83
End: 2023-10-03

## 2023-10-03 ENCOUNTER — LAB ENCOUNTER (OUTPATIENT)
Dept: LAB | Facility: HOSPITAL | Age: 83
End: 2023-10-03
Attending: UROLOGY
Payer: MEDICARE

## 2023-10-03 DIAGNOSIS — R33.9 URINARY RETENTION: ICD-10-CM

## 2023-10-03 DIAGNOSIS — N39.0 RECURRENT UTI: ICD-10-CM

## 2023-10-03 DIAGNOSIS — N40.1 BENIGN PROSTATIC HYPERPLASIA WITH LOWER URINARY TRACT SYMPTOMS, SYMPTOM DETAILS UNSPECIFIED: ICD-10-CM

## 2023-10-03 DIAGNOSIS — N39.0 RECURRENT UTI: Primary | ICD-10-CM

## 2023-10-03 PROCEDURE — 99214 OFFICE O/P EST MOD 30 MIN: CPT | Performed by: UROLOGY

## 2023-10-03 PROCEDURE — 87086 URINE CULTURE/COLONY COUNT: CPT

## 2023-10-03 NOTE — PROGRESS NOTES
Xenia Sheridan is a 82year old male. HPI:   Patient presents with:  BPH  Scrotum: Right epididymal orchitis, pt states has increased in size, denies pain   UTI: 9/24/23 pt [place] pt states he was feeling really tired and no issues since he finished antibiotics       82year-old male presents in follow-up to a previous visit April 3 2023. Has a history of BPH, neurogenic bladder dysfunction and incomplete emptying requiring chronic clean intermittent catheterization 3 times daily using a 14 Yoruba straight catheter. Overall doing well. He states he was in [place] 2 weeks ago and experienced symptoms of fatigue sleeping for 24 hours straight. He had a urine culture prior to going down to [facility]. It grew Klebsiella and enterobacteria. Initially started on Keflex. Communication is from the primary care physician suggests that he be switched to Cipro although the patient is unsure what antibiotic he ended up taking. Either way he states his symptoms resolved. He also experienced some testicular swelling which has improved. Again no fevers chills nausea or vomiting. He denies any problems or difficulties placing the catheter. IPSS score today is 4 quality-of-life of 4. He does urinate in between catheterizations. Renal bladder ultrasound performed last week demonstrates no significant abnormalities.       HISTORY:  Past Medical History:   Diagnosis Date    Coronary atherosclerosis     DVT, lower extremity (HCC)     Esophageal reflux     Hearing impairment     Bilateral hearing aids    High blood pressure     High cholesterol     Other and unspecified hyperlipidemia     Unspecified essential hypertension       Past Surgical History:   Procedure Laterality Date    BACK SURGERY      CATARACT      PROSTATE LASER ENUCLEATION      SKIN SURGERY  02/27/2019    Mohs/L superior him/SCC/done by MM    STENT PL SINGLE VES      2022      Family History Problem Relation Age of Onset    Heart Attack Father     Heart Attack Mother       Social History:   Social History     Socioeconomic History    Marital status:    Tobacco Use    Smoking status: Former     Types: Cigarettes     Quit date: 2001     Years since quittin.7    Smokeless tobacco: Never   Vaping Use    Vaping Use: Never used   Substance and Sexual Activity    Alcohol use: Not Currently    Drug use: No   Other Topics Concern    Caffeine Concern Yes    Exercise No        Medications (Active prior to today's visit):  Current Outpatient Medications   Medication Sig Dispense Refill    GABAPENTIN 300 MG Oral Cap TAKE 1 CAPSULE BY MOUTH THREE TIMES DAILY 270 capsule 0    SIMVASTATIN 40 MG Oral Tab TAKE 1 TABLET(40 MG) BY MOUTH EVERY NIGHT 90 tablet 0    bumetanide 1 MG Oral Tab Take 1 tablet (1 mg total) by mouth daily. carvedilol 6.25 MG Oral Tab Take 1 tablet (6.25 mg total) by mouth 2 (two) times daily with meals. 60 tablet 5    sacubitril-valsartan (ENTRESTO) 24-26 MG Oral Tab Take 1 tablet by mouth 2 (two) times daily. 60 tablet 5    finasteride 5 MG Oral Tab Take 1 tablet (5 mg total) by mouth daily. 90 tablet 3    OMEPRAZOLE 40 MG Oral Capsule Delayed Release TAKE 1 CAPSULE BY MOUTH TWICE DAILY 180 capsule 1    METFORMIN  MG Oral Tablet 24 Hr TAKE 1 TABLET(750 MG) BY MOUTH TWICE DAILY 180 tablet 1    clopidogrel 75 MG Oral Tab Take 1 tablet (75 mg total) by mouth daily. Ascorbic Acid (VITAMIN C) 1000 MG Oral Tab Take 1 tablet (1,000 mg total) by mouth daily. hydrocortisone 2.5 % External Ointment Apply to AAs BID for 2 weeks, then hold 2 weeks, repeat PRN. 454 g 2    acetaminophen 500 MG Oral Tab Take 2 tablets (1,000 mg total) by mouth every 8 (eight) hours as needed. Vitamin D3 2000 units Oral Cap Take 1 capsule (2,000 Units total) by mouth daily. Multiple Vitamins-Minerals (CENTRUM SILVER) Oral Tab Take 1 tablet by mouth daily.       aspirin 81 MG Oral Chew Tab Chew 1 tablet (81 mg total) by mouth daily. Ferrous Sulfate (IRON) 325 (65 Fe) MG Oral Tab Take 325 mg by mouth daily. Psyllium (METAMUCIL OR) Take 1 packet by mouth daily. Allergies:  No Known Allergies      ROS:       PHYSICAL EXAM:   On physical exam phallus and testicle exams unremarkable. No tenderness or swelling is appreciated testicle wise bilaterally. ASSESSMENT/PLAN:   Assessment   Recurrent uti  (primary encounter diagnosis)  Benign prostatic hyperplasia with lower urinary tract symptoms, symptom details unspecified  Urinary retention    Recommend:  - Repeat urine culture today to confirm normalization.  - Continue to catheterize with a 14 Malaysian straight catheter 3 times a day. - Follow-up otherwise with me in 6 months.          Orders This Visit:  Orders Placed This Encounter      Urine Culture, Routine      Meds This Visit:  Requested Prescriptions      No prescriptions requested or ordered in this encounter       Imaging & Referrals:  None     10/3/2023  Corbin Canela MD

## 2023-10-06 ENCOUNTER — PATIENT MESSAGE (OUTPATIENT)
Dept: INTERNAL MEDICINE CLINIC | Facility: CLINIC | Age: 83
End: 2023-10-06

## 2023-10-06 ENCOUNTER — HOSPITAL ENCOUNTER (OUTPATIENT)
Dept: INTERVENTIONAL RADIOLOGY/VASCULAR | Facility: HOSPITAL | Age: 83
Discharge: HOME | End: 2023-10-06
Attending: INTERNAL MEDICINE | Admitting: INTERNAL MEDICINE
Payer: MEDICARE

## 2023-10-06 VITALS
BODY MASS INDEX: 26.68 KG/M2 | SYSTOLIC BLOOD PRESSURE: 159 MMHG | DIASTOLIC BLOOD PRESSURE: 58 MMHG | TEMPERATURE: 98 F | WEIGHT: 170 LBS | HEIGHT: 67 IN | RESPIRATION RATE: 23 BRPM | HEART RATE: 77 BPM | OXYGEN SATURATION: 97 %

## 2023-10-06 DIAGNOSIS — I25.10 CAD (CORONARY ARTERY DISEASE): ICD-10-CM

## 2023-10-06 DIAGNOSIS — R94.39 ABNORMAL STRESS TEST: ICD-10-CM

## 2023-10-06 LAB
GLUCOSE BLD-MCNC: 112 MG/DL (ref 70–99)
ISTAT ACTIVATED CLOTTING TIME: 209 SECONDS (ref 74–137)
ISTAT ACTIVATED CLOTTING TIME: 239 SECONDS (ref 74–137)
ISTAT ACTIVATED CLOTTING TIME: 257 SECONDS (ref 74–137)
ISTAT ACTIVATED CLOTTING TIME: 269 SECONDS (ref 74–137)

## 2023-10-06 PROCEDURE — B211YZZ FLUOROSCOPY OF MULTIPLE CORONARY ARTERIES USING OTHER CONTRAST: ICD-10-PCS | Performed by: INTERNAL MEDICINE

## 2023-10-06 PROCEDURE — 93005 ELECTROCARDIOGRAM TRACING: CPT

## 2023-10-06 PROCEDURE — 85347 COAGULATION TIME ACTIVATED: CPT

## 2023-10-06 PROCEDURE — 93458 L HRT ARTERY/VENTRICLE ANGIO: CPT | Performed by: INTERNAL MEDICINE

## 2023-10-06 PROCEDURE — 82962 GLUCOSE BLOOD TEST: CPT

## 2023-10-06 PROCEDURE — 92979 ENDOLUMINL IVUS OCT C EA: CPT | Performed by: INTERNAL MEDICINE

## 2023-10-06 PROCEDURE — B241ZZ3 ULTRASONOGRAPHY OF MULTIPLE CORONARY ARTERIES, INTRAVASCULAR: ICD-10-PCS | Performed by: INTERNAL MEDICINE

## 2023-10-06 PROCEDURE — 99153 MOD SED SAME PHYS/QHP EA: CPT | Performed by: INTERNAL MEDICINE

## 2023-10-06 PROCEDURE — 92978 ENDOLUMINL IVUS OCT C 1ST: CPT | Performed by: INTERNAL MEDICINE

## 2023-10-06 PROCEDURE — 99211 OFF/OP EST MAY X REQ PHY/QHP: CPT

## 2023-10-06 PROCEDURE — 027135Z DILATION OF CORONARY ARTERY, TWO ARTERIES WITH TWO DRUG-ELUTING INTRALUMINAL DEVICES, PERCUTANEOUS APPROACH: ICD-10-PCS | Performed by: INTERNAL MEDICINE

## 2023-10-06 PROCEDURE — 99152 MOD SED SAME PHYS/QHP 5/>YRS: CPT | Performed by: INTERNAL MEDICINE

## 2023-10-06 PROCEDURE — 93010 ELECTROCARDIOGRAM REPORT: CPT | Performed by: INTERNAL MEDICINE

## 2023-10-06 PROCEDURE — 4A023N7 MEASUREMENT OF CARDIAC SAMPLING AND PRESSURE, LEFT HEART, PERCUTANEOUS APPROACH: ICD-10-PCS | Performed by: INTERNAL MEDICINE

## 2023-10-06 RX ORDER — SODIUM CHLORIDE 9 MG/ML
INJECTION, SOLUTION INTRAVENOUS
Status: COMPLETED | OUTPATIENT
Start: 2023-10-07 | End: 2023-10-06

## 2023-10-06 RX ORDER — SODIUM CHLORIDE 9 MG/ML
INJECTION, SOLUTION INTRAVENOUS CONTINUOUS
Status: ACTIVE | OUTPATIENT
Start: 2023-10-06 | End: 2023-10-06

## 2023-10-06 RX ORDER — IODIXANOL 320 MG/ML
100 INJECTION, SOLUTION INTRAVASCULAR
Status: COMPLETED | OUTPATIENT
Start: 2023-10-06 | End: 2023-10-06

## 2023-10-06 RX ORDER — ASPIRIN 81 MG/1
81 TABLET ORAL DAILY
OUTPATIENT
Start: 2023-10-07

## 2023-10-06 RX ORDER — HEPARIN SODIUM 5000 [USP'U]/ML
INJECTION, SOLUTION INTRAVENOUS; SUBCUTANEOUS
Status: COMPLETED
Start: 2023-10-06 | End: 2023-10-06

## 2023-10-06 RX ORDER — VERAPAMIL HYDROCHLORIDE 2.5 MG/ML
INJECTION, SOLUTION INTRAVENOUS
Status: DISCONTINUED
Start: 2023-10-06 | End: 2023-10-06 | Stop reason: WASHOUT

## 2023-10-06 RX ORDER — CLOPIDOGREL BISULFATE 75 MG/1
75 TABLET ORAL DAILY
OUTPATIENT
Start: 2023-10-07

## 2023-10-06 RX ORDER — MIDAZOLAM HYDROCHLORIDE 1 MG/ML
INJECTION INTRAMUSCULAR; INTRAVENOUS
Status: COMPLETED
Start: 2023-10-06 | End: 2023-10-06

## 2023-10-06 RX ORDER — LIDOCAINE HYDROCHLORIDE 10 MG/ML
INJECTION, SOLUTION EPIDURAL; INFILTRATION; INTRACAUDAL; PERINEURAL
Status: COMPLETED
Start: 2023-10-06 | End: 2023-10-06

## 2023-10-06 RX ORDER — ACETAMINOPHEN 325 MG/1
TABLET ORAL
Status: COMPLETED
Start: 2023-10-06 | End: 2023-10-06

## 2023-10-06 RX ORDER — CLOPIDOGREL BISULFATE 75 MG/1
TABLET ORAL
Status: COMPLETED
Start: 2023-10-06 | End: 2023-10-06

## 2023-10-06 RX ADMIN — SODIUM CHLORIDE: 9 INJECTION, SOLUTION INTRAVENOUS at 11:30:00

## 2023-10-06 RX ADMIN — ACETAMINOPHEN 650 MG: 325 TABLET ORAL at 15:35:00

## 2023-10-06 RX ADMIN — IODIXANOL 150 ML: 320 INJECTION, SOLUTION INTRAVASCULAR at 11:06:00

## 2023-10-06 RX ADMIN — SODIUM CHLORIDE: 9 INJECTION, SOLUTION INTRAVENOUS at 08:15:00

## 2023-10-06 NOTE — PROCEDURES
[address]    Cardiac Catheterization Note    Primary Proceduralist: Bridget Del Cid MD  Procedure Performed: LHC, LCX PCI, LCX IVUS, LAD PCI, LAD IVUS  Date of Procedure: 10/6/2023   Indication: Abnormal stress test  Pre Operative Diagnosis: CAD  Post Operative Diagnosis: CAD  Estimated blood loss: 10cc  Specimens: None    Consent obtained from the patient and documented in the paper chart, unless verbally obtained in an emergency setting. The benefits and risk of the procedure, including but not limited to infection, bleeding, myocardial infarction, stroke, vascular injury, emergency surgery, renal failure requiring dialysis and death were discussed with the patient. These complications occur in approximately 1-2% of elective procedures, but the risk may be significantly elevated in the setting of acute coronary syndrome, electrical or hemodynamic instability, multivessel disease, reduced LVEF or . The patient consented to any additional procedures performed emergently in order to address a complication or prevent medical deterioration. Viable alternatives were explained to the patient, including continuing medical therapy, with the risks incurred along that course. Procedure/Technique:    Lidocaine 1% was administered locally. Access of right femoral artery obtained using Seldinger technique. Ultrasound was not used. 6 Fr Sheath was inserted. J-wire advanced into the aorta under fluoroscopy. Left coronary system engaged using 6 Fr JL4. Selective angiogram performed. Right coronary system engaged using 6 Fr JR4. Selective angiogram performed. 6 Fr pigtail Catheter advanced into the LV. Hemodynamics were obtained. Coronary Angiogram Findings:  LM: Large caliber artery giving rise to LAD & LCX. Mild disease. LAD: Large caliber artery giving rise to diagonal and septal branches. Patent mid LAD stents. 60% mid to distal LAD stenosis. 70% apical LAD stenosis.   LCX: Medium to large caliber artery giving rise to OM branches. 80-90% proximal LCX stenosis. RCA: Large caliber artery giving rise to acute marginal branches, and bifurcates into RPDA & RPL. Mild diffuse disease. Hemodynamics:  /6, LVEDP 14  /62, mean 95  No significant gradient upon Ao-LV pullback  LVEF 30%    Intervention:  6 Fr EBU 3.75 Guide used to engage LM  Esan Blue (LCX)  Pre-dilated using 3.0 balloon  IVUS of LCX  MARV 3.5 x 15mm Ramah to pLCX  P.E using 3.5 NC  Angiogram showed COLEMAN III flow (lesion 90% > 0%)  Sean Blue (LAD)  Pre-dilated mid-distal LAD lesion using 3.0 balloon  IVUS performed (LAD)  MARV placement (3.0 x 15mm Ramah)  P.E using 3.0 NC   Angiogram showed COLEMAN III flow (LAD lesion 60% > 0%)  PCI concluded    Monitored sedation administered by the cath lab RN, and supervised throughout the procedure by myself. Total time 54 minutes. Closure: Femoral angiogram performed. No immediate complications. A/P:  80-90% proximal LCX stenosis s/p IVUS guided PCI (MARV x 1)  60% mid LAD stenosis  s/p IVUS guided PCI. 70% apical LAD stenosis to be managed medically  Mild RCA diffuse disease.   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

## 2023-10-06 NOTE — CARDIAC REHAB
CAD education completed. Pt offered outpatient cardiac rehab but declined at this time. Info left for patient.

## 2023-10-06 NOTE — PROGRESS NOTES
1100 - Received pt from cath lab s/p PCI via R femoral artery. Sheath in place. No bleeding or hematoma noted. VSS. Pt denied pain. EKG done. 1330-R fem sheath pulled. Manual pressure X20min. Baumann Rockers and tegaderm applied    1400-Pt requested straight cath (straight cath TID at home). Cathed using sterile technique without difficulty. Clear yellow urine drained. 1730- Pt ambulated in hallway-groin site with no bleeding or hematoma noted. Cardiac Rehab saw pt. IV d/c'd. Discharge instructions given, including stent card ans same day PCI RN number-pt verbalized understanding. Pt to lobby via [facility] and wife to drive home.

## 2023-10-07 LAB
ATRIAL RATE: 70 BPM
P AXIS: 56 DEGREES
P-R INTERVAL: 194 MS
Q-T INTERVAL: 482 MS
QRS DURATION: 190 MS
QTC CALCULATION (BEZET): 520 MS
R AXIS: -71 DEGREES
T AXIS: 54 DEGREES
VENTRICULAR RATE: 70 BPM

## 2023-10-09 NOTE — TELEPHONE ENCOUNTER
Pt called regarding this, explained that this appt is for his Annual physical. Pt understood and will keep appt as scheduled.

## 2023-10-10 RX ORDER — HYDROCHLOROTHIAZIDE 25 MG/1
TABLET ORAL
Qty: 90 TABLET | Refills: 1 | OUTPATIENT
Start: 2023-10-10

## 2023-10-10 NOTE — TELEPHONE ENCOUNTER
Name from pharmacy: HYDROCHLOROTHIAZIDE 25MG TABLETS          Will file in chart as: HYDROCHLOROTHIAZIDE 25 MG Oral Tab    The original prescription was discontinued on 7/18/2023 by Jan Torres MD for the following reason: Dose adjustment. Renewing this prescription may not be appropriate.     Sig: TAKE 1 TABLET(25 MG) BY MOUTH DAILY    Disp: 90 tablet    Refills: 1 (Pharmacy requested: Not specified)    Start: 10/10/2023    Class: Normal    Non-formulary    Last ordered: 6 months ago (4/11/2023) by Kitty Suarez MD    Last refill: 7/7/2023    Rx #: [id number]    Hypertension Medications Protocol Sauwpy31/10/2023 07:59 AM   Protocol Details CMP or BMP in past 12 months    Last serum creatinine< 2.0    Appointment in past 6 or next 3 months            Per notes 7/18/23 patient to be taking 12.5mg dose

## 2023-10-13 ENCOUNTER — OFFICE VISIT (OUTPATIENT)
Dept: INTERNAL MEDICINE CLINIC | Facility: CLINIC | Age: 83
End: 2023-10-13
Payer: MEDICARE

## 2023-10-13 VITALS
BODY MASS INDEX: 27.75 KG/M2 | RESPIRATION RATE: 18 BRPM | HEART RATE: 84 BPM | OXYGEN SATURATION: 96 % | DIASTOLIC BLOOD PRESSURE: 70 MMHG | WEIGHT: 176.81 LBS | SYSTOLIC BLOOD PRESSURE: 136 MMHG | HEIGHT: 66.93 IN | TEMPERATURE: 97 F

## 2023-10-13 DIAGNOSIS — Z00.00 MEDICARE ANNUAL WELLNESS VISIT, SUBSEQUENT: Primary | ICD-10-CM

## 2023-10-13 DIAGNOSIS — I10 BENIGN ESSENTIAL HTN: ICD-10-CM

## 2023-10-13 DIAGNOSIS — N18.32 STAGE 3B CHRONIC KIDNEY DISEASE (HCC): ICD-10-CM

## 2023-10-13 DIAGNOSIS — D64.9 CHRONIC ANEMIA: ICD-10-CM

## 2023-10-13 DIAGNOSIS — N40.1 BENIGN PROSTATIC HYPERPLASIA WITH LOWER URINARY TRACT SYMPTOMS, SYMPTOM DETAILS UNSPECIFIED: ICD-10-CM

## 2023-10-13 DIAGNOSIS — M48.061 SPINAL STENOSIS OF LUMBAR REGION, UNSPECIFIED WHETHER NEUROGENIC CLAUDICATION PRESENT: ICD-10-CM

## 2023-10-13 DIAGNOSIS — M54.16 LUMBAR RADICULOPATHY: ICD-10-CM

## 2023-10-13 DIAGNOSIS — I50.21 ACUTE SYSTOLIC HEART FAILURE (HCC): ICD-10-CM

## 2023-10-13 DIAGNOSIS — K21.9 GASTROESOPHAGEAL REFLUX DISEASE WITHOUT ESOPHAGITIS: ICD-10-CM

## 2023-10-13 DIAGNOSIS — I25.10 CORONARY ARTERY DISEASE INVOLVING NATIVE CORONARY ARTERY OF NATIVE HEART WITHOUT ANGINA PECTORIS: ICD-10-CM

## 2023-10-13 DIAGNOSIS — E78.5 DYSLIPIDEMIA: ICD-10-CM

## 2023-10-13 DIAGNOSIS — R73.9 BLOOD GLUCOSE ELEVATED: ICD-10-CM

## 2023-10-13 PROBLEM — E86.0 DEHYDRATION: Status: RESOLVED | Noted: 2021-05-05 | Resolved: 2023-10-13

## 2023-10-13 PROBLEM — N18.31 STAGE 3A CHRONIC KIDNEY DISEASE (HCC): Status: RESOLVED | Noted: 2022-05-06 | Resolved: 2023-10-13

## 2023-10-13 RX ORDER — AMIODARONE HYDROCHLORIDE 200 MG/1
200 TABLET ORAL DAILY
COMMUNITY
Start: 2023-10-02

## 2023-10-13 RX ORDER — SPIRONOLACTONE 25 MG/1
25 TABLET ORAL DAILY
COMMUNITY
Start: 2023-08-17

## 2023-11-10 DIAGNOSIS — B34.9 VIRAL SYNDROME: ICD-10-CM

## 2023-11-13 DIAGNOSIS — E78.5 DYSLIPIDEMIA: ICD-10-CM

## 2023-11-14 RX ORDER — BENZONATATE 200 MG/1
200 CAPSULE ORAL 3 TIMES DAILY PRN
Qty: 30 CAPSULE | Refills: 0 | OUTPATIENT
Start: 2023-11-14

## 2023-11-14 RX ORDER — SIMVASTATIN 40 MG
40 TABLET ORAL NIGHTLY
Qty: 90 TABLET | Refills: 1 | Status: SHIPPED | OUTPATIENT
Start: 2023-11-14

## 2023-11-14 NOTE — TELEPHONE ENCOUNTER
Requested Prescriptions     Pending Prescriptions Disp Refills    METFORMIN  MG Oral Tablet 24 Hr [Pharmacy Med Name: METFORMIN ER 750MG 24HR TABS] 180 tablet 0     Sig: TAKE 1 TABLET(750 MG) BY MOUTH TWICE DAILY       LOV:  10--medicare--TB    LAST CPE: 10--medicare--TB    Last Labs:  9--bmp,cbc: 12--a1c    Last Refill: 1--- 180 tabs with 1 refills     Your appointments       Date & Time Appointment Department [facility])    Feb 05, 2024  9:00 AM CST Follow Up Visit with Eulogio Weeks MD [address], [facility] ([facility] [address] [place] [address]/[facility])    Contact your primary care provider if your insurance requires a referral.    Please arrive 15 minutes prior to your scheduled appointment. Be sure to bring your current Insurance card, Photo ID, and medication bottles or a list of your current medications. A 24 hour notice is required to cancel any appointment or you may be charged a $40 No Show Fee. Important: 24 hour notice is required to cancel any appointment or you may be charged a $40 No Show Fee. Please notify your physician office.          Apr 08, 2024  8:20 AM CDT Follow Up Visit with Phoenix Tsang MD [address], [address], [place] ([facility])              [address] [facility]/[address], [address], [address]  3 [facility]  [phone]

## 2023-11-15 RX ORDER — METFORMIN HYDROCHLORIDE 750 MG/1
TABLET, EXTENDED RELEASE ORAL
Qty: 180 TABLET | Refills: 0 | Status: SHIPPED | OUTPATIENT
Start: 2023-11-15

## 2023-11-18 RX ORDER — GABAPENTIN 300 MG/1
CAPSULE ORAL
Qty: 270 CAPSULE | Refills: 0 | Status: SHIPPED | OUTPATIENT
Start: 2023-11-18

## 2023-12-06 ENCOUNTER — LAB ENCOUNTER (OUTPATIENT)
Dept: LAB | Age: 83
End: 2023-12-06
Attending: INTERNAL MEDICINE
Payer: MEDICARE

## 2023-12-06 DIAGNOSIS — R79.89 ELEVATED SERUM CREATININE: ICD-10-CM

## 2023-12-06 DIAGNOSIS — N18.32 STAGE 3B CHRONIC KIDNEY DISEASE (HCC): ICD-10-CM

## 2023-12-06 DIAGNOSIS — R73.9 BLOOD GLUCOSE ELEVATED: ICD-10-CM

## 2023-12-06 LAB
ANION GAP SERPL CALC-SCNC: 5 MMOL/L (ref 0–18)
BASOPHILS # BLD AUTO: 0.02 X10(3) UL (ref 0–0.2)
BASOPHILS NFR BLD AUTO: 0.3 %
BUN BLD-MCNC: 86 MG/DL (ref 9–23)
CALCIUM BLD-MCNC: 9.2 MG/DL (ref 8.5–10.1)
CHLORIDE SERPL-SCNC: 112 MMOL/L (ref 98–112)
CO2 SERPL-SCNC: 24 MMOL/L (ref 21–32)
CREAT BLD-MCNC: 2.77 MG/DL
EGFRCR SERPLBLD CKD-EPI 2021: 22 ML/MIN/1.73M2 (ref 60–?)
EOSINOPHIL # BLD AUTO: 0.33 X10(3) UL (ref 0–0.7)
EOSINOPHIL NFR BLD AUTO: 4.1 %
ERYTHROCYTE [DISTWIDTH] IN BLOOD BY AUTOMATED COUNT: 15.7 %
EST. AVERAGE GLUCOSE BLD GHB EST-MCNC: 128 MG/DL (ref 68–126)
FASTING STATUS PATIENT QL REPORTED: YES
GLUCOSE BLD-MCNC: 155 MG/DL (ref 70–99)
HBA1C MFR BLD: 6.1 % (ref ?–5.7)
HCT VFR BLD AUTO: 30.4 %
HGB BLD-MCNC: 9.3 G/DL
IMM GRANULOCYTES # BLD AUTO: 0.02 X10(3) UL (ref 0–1)
IMM GRANULOCYTES NFR BLD: 0.3 %
LYMPHOCYTES # BLD AUTO: 1.27 X10(3) UL (ref 1–4)
LYMPHOCYTES NFR BLD AUTO: 15.9 %
MCH RBC QN AUTO: 20.4 PG (ref 26–34)
MCHC RBC AUTO-ENTMCNC: 30.6 G/DL (ref 31–37)
MCV RBC AUTO: 66.7 FL
MONOCYTES # BLD AUTO: 0.64 X10(3) UL (ref 0.1–1)
MONOCYTES NFR BLD AUTO: 8 %
NEUTROPHILS # BLD AUTO: 5.7 X10 (3) UL (ref 1.5–7.7)
NEUTROPHILS # BLD AUTO: 5.7 X10(3) UL (ref 1.5–7.7)
NEUTROPHILS NFR BLD AUTO: 71.4 %
OSMOLALITY SERPL CALC.SUM OF ELEC: 321 MOSM/KG (ref 275–295)
PLATELET # BLD AUTO: 239 10(3)UL (ref 150–450)
POTASSIUM SERPL-SCNC: 4.4 MMOL/L (ref 3.5–5.1)
RBC # BLD AUTO: 4.56 X10(6)UL
SODIUM SERPL-SCNC: 141 MMOL/L (ref 136–145)
WBC # BLD AUTO: 8 X10(3) UL (ref 4–11)

## 2023-12-06 PROCEDURE — 80048 BASIC METABOLIC PNL TOTAL CA: CPT

## 2023-12-06 PROCEDURE — 83036 HEMOGLOBIN GLYCOSYLATED A1C: CPT

## 2023-12-06 PROCEDURE — 85025 COMPLETE CBC W/AUTO DIFF WBC: CPT

## 2023-12-06 PROCEDURE — 36415 COLL VENOUS BLD VENIPUNCTURE: CPT

## 2023-12-07 ENCOUNTER — TELEPHONE (OUTPATIENT)
Dept: INTERNAL MEDICINE CLINIC | Facility: CLINIC | Age: 83
End: 2023-12-07

## 2023-12-07 DIAGNOSIS — N17.9 AKI (ACUTE KIDNEY INJURY) (HCC): Primary | ICD-10-CM

## 2023-12-07 NOTE — TELEPHONE ENCOUNTER
Called and spoke to pt's spouse, Hailey (on HIPAA). Advised for pt to rpt BMP today and call cardiology to f/u with them as well. Hailey stated understanding and agreed to plan.

## 2023-12-07 NOTE — TELEPHONE ENCOUNTER
Received call from [facility] regarding ANITRA.  He was directed there by our office 12/6.  Spoke to ER MD, Dr Up,  he deferred admission.  Spironolactone held.  Treated for UTI, IVF and d/c to home with follow up from our office.    Will need labs and follow up with cardiology.  BMP ordered to be completed today.  Confirm he will discuss with cardiology today as well.     Creatinine 12/6 [facility] ER 2.9  Baseline creatinine 1.6

## 2023-12-08 ENCOUNTER — LAB ENCOUNTER (OUTPATIENT)
Dept: LAB | Age: 83
End: 2023-12-08
Attending: INTERNAL MEDICINE
Payer: MEDICARE

## 2023-12-08 ENCOUNTER — TELEPHONE (OUTPATIENT)
Dept: INTERNAL MEDICINE CLINIC | Facility: CLINIC | Age: 83
End: 2023-12-08

## 2023-12-08 DIAGNOSIS — N17.9 AKI (ACUTE KIDNEY INJURY) (HCC): ICD-10-CM

## 2023-12-08 DIAGNOSIS — N18.32 STAGE 3B CHRONIC KIDNEY DISEASE (HCC): Primary | ICD-10-CM

## 2023-12-08 LAB
ANION GAP SERPL CALC-SCNC: 8 MMOL/L (ref 0–18)
BUN BLD-MCNC: 86 MG/DL (ref 9–23)
CALCIUM BLD-MCNC: 9.2 MG/DL (ref 8.5–10.1)
CHLORIDE SERPL-SCNC: 112 MMOL/L (ref 98–112)
CO2 SERPL-SCNC: 22 MMOL/L (ref 21–32)
CREAT BLD-MCNC: 2.7 MG/DL
EGFRCR SERPLBLD CKD-EPI 2021: 23 ML/MIN/1.73M2 (ref 60–?)
FASTING STATUS PATIENT QL REPORTED: NO
GLUCOSE BLD-MCNC: 145 MG/DL (ref 70–99)
OSMOLALITY SERPL CALC.SUM OF ELEC: 323 MOSM/KG (ref 275–295)
POTASSIUM SERPL-SCNC: 4.8 MMOL/L (ref 3.5–5.1)
SODIUM SERPL-SCNC: 142 MMOL/L (ref 136–145)

## 2023-12-08 PROCEDURE — 36415 COLL VENOUS BLD VENIPUNCTURE: CPT

## 2023-12-08 PROCEDURE — 80048 BASIC METABOLIC PNL TOTAL CA: CPT

## 2023-12-08 NOTE — TELEPHONE ENCOUNTER
BUN and Cr still elevated.     BUN  9 - 23 mg/dL 86 High    Creatinine  0.70 - 1.30 mg/dL 2.70 High        Routing to PCP TB and SD as lab was ordered by SD when on call.

## 2023-12-08 NOTE — TELEPHONE ENCOUNTER
Called and spoke to Hailey. Informed her Cr slightly better. Advised to stop metformin and contact cards to see if there are any med changes needed. Per Hailey, she did already try calling cardiology, awaiting response but it is sometimes hard to get response from them. Rpt BMP Monday. Hailey stated understanding and agreed to plan.

## 2023-12-08 NOTE — TELEPHONE ENCOUNTER
Cr slightly better, have him stop metformin for pre dm, needs to check with his cardiologist as well on any other med adjustment with poor renal functions  Repeat BMP on Monday. I will order

## 2023-12-12 ENCOUNTER — LAB ENCOUNTER (OUTPATIENT)
Dept: LAB | Age: 83
End: 2023-12-12
Attending: INTERNAL MEDICINE
Payer: MEDICARE

## 2023-12-12 DIAGNOSIS — N18.32 STAGE 3B CHRONIC KIDNEY DISEASE (HCC): ICD-10-CM

## 2023-12-12 LAB
ANION GAP SERPL CALC-SCNC: 5 MMOL/L (ref 0–18)
BUN BLD-MCNC: 97 MG/DL (ref 9–23)
CALCIUM BLD-MCNC: 9.1 MG/DL (ref 8.5–10.1)
CHLORIDE SERPL-SCNC: 111 MMOL/L (ref 98–112)
CO2 SERPL-SCNC: 24 MMOL/L (ref 21–32)
CREAT BLD-MCNC: 2.82 MG/DL
EGFRCR SERPLBLD CKD-EPI 2021: 22 ML/MIN/1.73M2 (ref 60–?)
FASTING STATUS PATIENT QL REPORTED: NO
GLUCOSE BLD-MCNC: 115 MG/DL (ref 70–99)
OSMOLALITY SERPL CALC.SUM OF ELEC: 321 MOSM/KG (ref 275–295)
POTASSIUM SERPL-SCNC: 4.6 MMOL/L (ref 3.5–5.1)
SODIUM SERPL-SCNC: 140 MMOL/L (ref 136–145)

## 2023-12-12 PROCEDURE — 80048 BASIC METABOLIC PNL TOTAL CA: CPT

## 2023-12-12 PROCEDURE — 36415 COLL VENOUS BLD VENIPUNCTURE: CPT

## 2023-12-13 ENCOUNTER — HOSPITAL ENCOUNTER (EMERGENCY)
Facility: HOSPITAL | Age: 83
Discharge: HOME | End: 2023-12-13
Attending: STUDENT IN AN ORGANIZED HEALTH CARE EDUCATION/TRAINING PROGRAM
Payer: MEDICARE

## 2023-12-13 VITALS
RESPIRATION RATE: 18 BRPM | TEMPERATURE: 98 F | OXYGEN SATURATION: 96 % | BODY MASS INDEX: 26.68 KG/M2 | DIASTOLIC BLOOD PRESSURE: 61 MMHG | SYSTOLIC BLOOD PRESSURE: 161 MMHG | WEIGHT: 170 LBS | HEART RATE: 59 BPM | HEIGHT: 67 IN

## 2023-12-13 DIAGNOSIS — E86.0 DEHYDRATION: Primary | ICD-10-CM

## 2023-12-13 DIAGNOSIS — N18.9 ACUTE RENAL FAILURE SUPERIMPOSED ON CHRONIC KIDNEY DISEASE, UNSPECIFIED ACUTE RENAL FAILURE TYPE, UNSPECIFIED CKD STAGE  (HCC): ICD-10-CM

## 2023-12-13 DIAGNOSIS — N17.9 ACUTE RENAL FAILURE SUPERIMPOSED ON CHRONIC KIDNEY DISEASE, UNSPECIFIED ACUTE RENAL FAILURE TYPE, UNSPECIFIED CKD STAGE  (HCC): ICD-10-CM

## 2023-12-13 LAB
ALBUMIN SERPL-MCNC: 3.8 G/DL (ref 3.4–5)
ALBUMIN/GLOB SERPL: 1 {RATIO} (ref 1–2)
ALP LIVER SERPL-CCNC: 77 U/L
ALT SERPL-CCNC: 36 U/L
ANION GAP SERPL CALC-SCNC: 5 MMOL/L (ref 0–18)
BASOPHILS # BLD AUTO: 0.05 X10(3) UL (ref 0–0.2)
BASOPHILS NFR BLD AUTO: 0.6 %
BILIRUB SERPL-MCNC: 0.5 MG/DL (ref 0.1–2)
BILIRUB UR QL STRIP.AUTO: NEGATIVE
BUN BLD-MCNC: 91 MG/DL (ref 9–23)
CALCIUM BLD-MCNC: 9.2 MG/DL (ref 8.5–10.1)
CHLORIDE SERPL-SCNC: 114 MMOL/L (ref 98–112)
CLARITY UR REFRACT.AUTO: CLEAR
CO2 SERPL-SCNC: 21 MMOL/L (ref 21–32)
CREAT BLD-MCNC: 2.62 MG/DL
EGFRCR SERPLBLD CKD-EPI 2021: 24 ML/MIN/1.73M2 (ref 60–?)
EOSINOPHIL # BLD AUTO: 0.45 X10(3) UL (ref 0–0.7)
EOSINOPHIL NFR BLD AUTO: 5.2 %
ERYTHROCYTE [DISTWIDTH] IN BLOOD BY AUTOMATED COUNT: 15.8 %
GLOBULIN PLAS-MCNC: 4 G/DL (ref 2.8–4.4)
GLUCOSE BLD-MCNC: 112 MG/DL (ref 70–99)
GLUCOSE UR STRIP.AUTO-MCNC: NORMAL MG/DL
HCT VFR BLD AUTO: 30.9 %
HGB BLD-MCNC: 9.6 G/DL
HYALINE CASTS #/AREA URNS AUTO: PRESENT /LPF
IMM GRANULOCYTES # BLD AUTO: 0.02 X10(3) UL (ref 0–1)
IMM GRANULOCYTES NFR BLD: 0.2 %
KETONES UR STRIP.AUTO-MCNC: NEGATIVE MG/DL
LEUKOCYTE ESTERASE UR QL STRIP.AUTO: 75
LYMPHOCYTES # BLD AUTO: 1.42 X10(3) UL (ref 1–4)
LYMPHOCYTES NFR BLD AUTO: 16.3 %
MCH RBC QN AUTO: 20.3 PG (ref 26–34)
MCHC RBC AUTO-ENTMCNC: 31.1 G/DL (ref 31–37)
MCV RBC AUTO: 65.2 FL
MONOCYTES # BLD AUTO: 0.93 X10(3) UL (ref 0.1–1)
MONOCYTES NFR BLD AUTO: 10.7 %
NEUTROPHILS # BLD AUTO: 5.84 X10 (3) UL (ref 1.5–7.7)
NEUTROPHILS # BLD AUTO: 5.84 X10(3) UL (ref 1.5–7.7)
NEUTROPHILS NFR BLD AUTO: 67 %
NITRITE UR QL STRIP.AUTO: NEGATIVE
OSMOLALITY SERPL CALC.SUM OF ELEC: 319 MOSM/KG (ref 275–295)
PH UR STRIP.AUTO: 5 [PH] (ref 5–8)
PLATELET # BLD AUTO: 224 10(3)UL (ref 150–450)
PROT SERPL-MCNC: 7.8 G/DL (ref 6.4–8.2)
PROT UR STRIP.AUTO-MCNC: NEGATIVE MG/DL
RBC # BLD AUTO: 4.74 X10(6)UL
RBC UR QL AUTO: NEGATIVE
SODIUM SERPL-SCNC: 140 MMOL/L (ref 136–145)
SP GR UR STRIP.AUTO: 1.01 (ref 1–1.03)
UROBILINOGEN UR STRIP.AUTO-MCNC: NORMAL MG/DL
WBC # BLD AUTO: 8.7 X10(3) UL (ref 4–11)

## 2023-12-13 PROCEDURE — 99284 EMERGENCY DEPT VISIT MOD MDM: CPT

## 2023-12-13 PROCEDURE — 36415 COLL VENOUS BLD VENIPUNCTURE: CPT

## 2023-12-13 PROCEDURE — 87086 URINE CULTURE/COLONY COUNT: CPT | Performed by: STUDENT IN AN ORGANIZED HEALTH CARE EDUCATION/TRAINING PROGRAM

## 2023-12-13 PROCEDURE — 81001 URINALYSIS AUTO W/SCOPE: CPT | Performed by: STUDENT IN AN ORGANIZED HEALTH CARE EDUCATION/TRAINING PROGRAM

## 2023-12-13 PROCEDURE — 80053 COMPREHEN METABOLIC PANEL: CPT | Performed by: STUDENT IN AN ORGANIZED HEALTH CARE EDUCATION/TRAINING PROGRAM

## 2023-12-13 PROCEDURE — 85025 COMPLETE CBC W/AUTO DIFF WBC: CPT | Performed by: STUDENT IN AN ORGANIZED HEALTH CARE EDUCATION/TRAINING PROGRAM

## 2023-12-13 RX ORDER — SODIUM CHLORIDE 9 MG/ML
INJECTION, SOLUTION INTRAVENOUS CONTINUOUS
Status: DISCONTINUED | OUTPATIENT
Start: 2023-12-13 | End: 2023-12-13

## 2023-12-13 NOTE — ED INITIAL ASSESSMENT (HPI)
Pt seen last Thursday and Crt level was high, given IV fluids and told he had a UTI. Still on antibiotics.  BUN and Crt drawn yesterday now higher, sent today for eval.

## 2023-12-13 NOTE — DISCHARGE INSTRUCTIONS
Discontinue spironolactone. Hold your Bumex, for now, resume on Saturday. Have your blood work rechecked on Tuesday or Wednesday. Follow-up with Dr. Brisa De La Torre, nephrology, your cardiologist and your primary care doctors. Return to the ER if difficulty breathing, or new concerns were to develop.

## 2023-12-14 ENCOUNTER — PATIENT OUTREACH (OUTPATIENT)
Dept: CASE MANAGEMENT | Age: 83
End: 2023-12-14

## 2023-12-14 ENCOUNTER — OFFICE VISIT (OUTPATIENT)
Dept: NEPHROLOGY | Facility: CLINIC | Age: 83
End: 2023-12-14
Payer: MEDICARE

## 2023-12-14 VITALS — BODY MASS INDEX: 27 KG/M2 | SYSTOLIC BLOOD PRESSURE: 118 MMHG | WEIGHT: 172.38 LBS | DIASTOLIC BLOOD PRESSURE: 64 MMHG

## 2023-12-14 DIAGNOSIS — N17.9 AKI (ACUTE KIDNEY INJURY) (HCC): Primary | ICD-10-CM

## 2023-12-14 DIAGNOSIS — N18.31 STAGE 3A CHRONIC KIDNEY DISEASE (HCC): ICD-10-CM

## 2023-12-14 PROCEDURE — 99204 OFFICE O/P NEW MOD 45 MIN: CPT | Performed by: INTERNAL MEDICINE

## 2023-12-14 NOTE — PROGRESS NOTES
1st attempt ER f/up apt request  [address] -existing apt (12/14)  CARDS -existing apt, pt states he has apt coming up in the next few     [facility]   TS[address] [facility] [address]  [phone]  Apt: Dec 21 @9:40am     Confirmed w/ pt  Closing encounter

## 2023-12-20 ENCOUNTER — LAB ENCOUNTER (OUTPATIENT)
Dept: LAB | Age: 83
End: 2023-12-20
Attending: INTERNAL MEDICINE
Payer: MEDICARE

## 2023-12-20 DIAGNOSIS — N17.9 AKI (ACUTE KIDNEY INJURY) (HCC): ICD-10-CM

## 2023-12-20 DIAGNOSIS — N18.31 STAGE 3A CHRONIC KIDNEY DISEASE (HCC): ICD-10-CM

## 2023-12-20 LAB
ANION GAP SERPL CALC-SCNC: 5 MMOL/L (ref 0–18)
BUN BLD-MCNC: 62 MG/DL (ref 9–23)
CALCIUM BLD-MCNC: 9.1 MG/DL (ref 8.5–10.1)
CHLORIDE SERPL-SCNC: 113 MMOL/L (ref 98–112)
CO2 SERPL-SCNC: 23 MMOL/L (ref 21–32)
CREAT BLD-MCNC: 2.03 MG/DL
EGFRCR SERPLBLD CKD-EPI 2021: 32 ML/MIN/1.73M2 (ref 60–?)
FASTING STATUS PATIENT QL REPORTED: NO
GLUCOSE BLD-MCNC: 134 MG/DL (ref 70–99)
OSMOLALITY SERPL CALC.SUM OF ELEC: 312 MOSM/KG (ref 275–295)
POTASSIUM SERPL-SCNC: 4.3 MMOL/L (ref 3.5–5.1)
SODIUM SERPL-SCNC: 141 MMOL/L (ref 136–145)

## 2023-12-20 PROCEDURE — 80048 BASIC METABOLIC PNL TOTAL CA: CPT

## 2023-12-20 PROCEDURE — 36415 COLL VENOUS BLD VENIPUNCTURE: CPT

## 2023-12-21 ENCOUNTER — OFFICE VISIT (OUTPATIENT)
Dept: INTERNAL MEDICINE CLINIC | Facility: CLINIC | Age: 83
End: 2023-12-21
Payer: MEDICARE

## 2023-12-21 VITALS
WEIGHT: 178.25 LBS | HEIGHT: 66.54 IN | RESPIRATION RATE: 16 BRPM | DIASTOLIC BLOOD PRESSURE: 68 MMHG | TEMPERATURE: 99 F | BODY MASS INDEX: 28.31 KG/M2 | HEART RATE: 62 BPM | OXYGEN SATURATION: 94 % | SYSTOLIC BLOOD PRESSURE: 138 MMHG

## 2023-12-21 DIAGNOSIS — R73.9 BLOOD GLUCOSE ELEVATED: ICD-10-CM

## 2023-12-21 DIAGNOSIS — N18.32 STAGE 3B CHRONIC KIDNEY DISEASE (HCC): Primary | ICD-10-CM

## 2023-12-21 DIAGNOSIS — M54.16 LUMBAR RADICULOPATHY: ICD-10-CM

## 2023-12-21 DIAGNOSIS — I10 BENIGN ESSENTIAL HTN: ICD-10-CM

## 2023-12-21 DIAGNOSIS — I50.22 CHRONIC SYSTOLIC (CONGESTIVE) HEART FAILURE (HCC): ICD-10-CM

## 2023-12-21 PROCEDURE — 1126F AMNT PAIN NOTED NONE PRSNT: CPT | Performed by: INTERNAL MEDICINE

## 2023-12-21 PROCEDURE — 99214 OFFICE O/P EST MOD 30 MIN: CPT | Performed by: INTERNAL MEDICINE

## 2023-12-21 PROCEDURE — 1111F DSCHRG MED/CURRENT MED MERGE: CPT | Performed by: INTERNAL MEDICINE

## 2024-01-29 ENCOUNTER — HOSPITAL ENCOUNTER (OUTPATIENT)
Dept: CV DIAGNOSTICS | Age: 84
Discharge: HOME | End: 2024-01-29
Attending: INTERNAL MEDICINE
Payer: MEDICARE

## 2024-01-29 DIAGNOSIS — Z95.5 STATUS POST CORONARY ARTERY STENT PLACEMENT: ICD-10-CM

## 2024-01-29 DIAGNOSIS — R00.1 BRADYCARDIA: ICD-10-CM

## 2024-01-29 PROCEDURE — 93306 TTE W/DOPPLER COMPLETE: CPT | Performed by: INTERNAL MEDICINE

## 2024-02-05 ENCOUNTER — HOSPITAL ENCOUNTER (OUTPATIENT)
Dept: GENERAL RADIOLOGY | Age: 84
Discharge: HOME | End: 2024-02-05
Attending: INTERNAL MEDICINE
Payer: MEDICARE

## 2024-02-05 DIAGNOSIS — Z51.81 ENCOUNTER FOR THERAPEUTIC DRUG MONITORING: ICD-10-CM

## 2024-02-05 PROCEDURE — 71046 X-RAY EXAM CHEST 2 VIEWS: CPT | Performed by: INTERNAL MEDICINE

## 2024-02-20 ENCOUNTER — LAB ENCOUNTER (OUTPATIENT)
Dept: LAB | Age: 84
End: 2024-02-20
Attending: INTERNAL MEDICINE
Payer: MEDICARE

## 2024-02-20 DIAGNOSIS — I10 BENIGN ESSENTIAL HTN: ICD-10-CM

## 2024-02-20 DIAGNOSIS — N39.0 FREQUENT UTI: ICD-10-CM

## 2024-02-20 DIAGNOSIS — N18.32 STAGE 3B CHRONIC KIDNEY DISEASE (HCC): ICD-10-CM

## 2024-02-20 DIAGNOSIS — R73.9 BLOOD GLUCOSE ELEVATED: ICD-10-CM

## 2024-02-20 DIAGNOSIS — D64.9 CHRONIC ANEMIA: ICD-10-CM

## 2024-02-20 LAB
ALBUMIN SERPL-MCNC: 3.8 G/DL (ref 3.4–5)
ALBUMIN/GLOB SERPL: 1.1 {RATIO} (ref 1–2)
ALP LIVER SERPL-CCNC: 72 U/L
ALT SERPL-CCNC: 25 U/L
ANION GAP SERPL CALC-SCNC: 4 MMOL/L (ref 0–18)
AST SERPL-CCNC: 14 U/L (ref 15–37)
BASOPHILS # BLD AUTO: 0.04 X10(3) UL (ref 0–0.2)
BASOPHILS NFR BLD AUTO: 0.6 %
BILIRUB SERPL-MCNC: 0.6 MG/DL (ref 0.1–2)
BUN BLD-MCNC: 40 MG/DL (ref 9–23)
CALCIUM BLD-MCNC: 9.2 MG/DL (ref 8.5–10.1)
CHLORIDE SERPL-SCNC: 113 MMOL/L (ref 98–112)
CO2 SERPL-SCNC: 26 MMOL/L (ref 21–32)
CREAT BLD-MCNC: 1.79 MG/DL
EGFRCR SERPLBLD CKD-EPI 2021: 38 ML/MIN/1.73M2 (ref 60–?)
EOSINOPHIL # BLD AUTO: 0.33 X10(3) UL (ref 0–0.7)
EOSINOPHIL NFR BLD AUTO: 4.5 %
ERYTHROCYTE [DISTWIDTH] IN BLOOD BY AUTOMATED COUNT: 15.8 %
EST. AVERAGE GLUCOSE BLD GHB EST-MCNC: 114 MG/DL (ref 68–126)
FASTING STATUS PATIENT QL REPORTED: NO
GLOBULIN PLAS-MCNC: 3.5 G/DL (ref 2.8–4.4)
GLUCOSE BLD-MCNC: 147 MG/DL (ref 70–99)
HBA1C MFR BLD: 5.6 % (ref ?–5.7)
HCT VFR BLD AUTO: 32.1 %
HGB BLD-MCNC: 9.4 G/DL
IMM GRANULOCYTES # BLD AUTO: 0.03 X10(3) UL (ref 0–1)
IMM GRANULOCYTES NFR BLD: 0.4 %
LYMPHOCYTES # BLD AUTO: 1.18 X10(3) UL (ref 1–4)
LYMPHOCYTES NFR BLD AUTO: 16.3 %
MCH RBC QN AUTO: 20.1 PG (ref 26–34)
MCHC RBC AUTO-ENTMCNC: 29.3 G/DL (ref 31–37)
MCV RBC AUTO: 68.6 FL
MONOCYTES # BLD AUTO: 0.72 X10(3) UL (ref 0.1–1)
MONOCYTES NFR BLD AUTO: 9.9 %
NEUTROPHILS # BLD AUTO: 4.96 X10 (3) UL (ref 1.5–7.7)
NEUTROPHILS # BLD AUTO: 4.96 X10(3) UL (ref 1.5–7.7)
NEUTROPHILS NFR BLD AUTO: 68.3 %
OSMOLALITY SERPL CALC.SUM OF ELEC: 308 MOSM/KG (ref 275–295)
PLATELET # BLD AUTO: 230 10(3)UL (ref 150–450)
POTASSIUM SERPL-SCNC: 4.2 MMOL/L (ref 3.5–5.1)
PROT SERPL-MCNC: 7.3 G/DL (ref 6.4–8.2)
RBC # BLD AUTO: 4.68 X10(6)UL
SODIUM SERPL-SCNC: 143 MMOL/L (ref 136–145)
WBC # BLD AUTO: 7.3 X10(3) UL (ref 4–11)

## 2024-02-20 PROCEDURE — 80053 COMPREHEN METABOLIC PANEL: CPT

## 2024-02-20 PROCEDURE — 36415 COLL VENOUS BLD VENIPUNCTURE: CPT

## 2024-02-20 PROCEDURE — 83036 HEMOGLOBIN GLYCOSYLATED A1C: CPT

## 2024-02-20 PROCEDURE — 85025 COMPLETE CBC W/AUTO DIFF WBC: CPT

## 2024-02-22 ENCOUNTER — PATIENT MESSAGE (OUTPATIENT)
Dept: INTERNAL MEDICINE CLINIC | Facility: CLINIC | Age: 84
End: 2024-02-22

## 2024-02-23 RX ORDER — GABAPENTIN 300 MG/1
300 CAPSULE ORAL 3 TIMES DAILY
Qty: 270 CAPSULE | Refills: 1 | Status: SHIPPED | OUTPATIENT
Start: 2024-02-23

## 2024-02-23 RX ORDER — GABAPENTIN 300 MG/1
CAPSULE ORAL
Qty: 270 CAPSULE | Refills: 0 | Status: SHIPPED | OUTPATIENT
Start: 2024-02-23

## 2024-02-23 NOTE — TELEPHONE ENCOUNTER
From: Mik Valero  To: Jaja Karimi  Sent: 2/22/2024 6:39 AM CST  Subject: Refil    Pasha is out of gapapentin refills. Can you approve for refill.

## 2024-03-04 ENCOUNTER — PATIENT MESSAGE (OUTPATIENT)
Dept: SURGERY | Facility: CLINIC | Age: 84
End: 2024-03-04

## 2024-03-11 NOTE — TELEPHONE ENCOUNTER
From: Mik Valero  To: Dora Smith  Sent: 3/4/2024 6:06 AM CST  Subject: Tests before visit    Is it correct that Pasha is to have a test of scrotum and urine test before visit. They showed up on my chart?

## 2024-04-04 ENCOUNTER — OFFICE VISIT (OUTPATIENT)
Dept: SURGERY | Facility: CLINIC | Age: 84
End: 2024-04-04
Payer: MEDICARE

## 2024-04-04 DIAGNOSIS — N40.1 BENIGN PROSTATIC HYPERPLASIA WITH LOWER URINARY TRACT SYMPTOMS, SYMPTOM DETAILS UNSPECIFIED: ICD-10-CM

## 2024-04-04 DIAGNOSIS — R33.9 URINARY RETENTION: ICD-10-CM

## 2024-04-04 DIAGNOSIS — N39.0 RECURRENT UTI: Primary | ICD-10-CM

## 2024-04-04 PROCEDURE — 99213 OFFICE O/P EST LOW 20 MIN: CPT | Performed by: UROLOGY

## 2024-04-04 NOTE — PROGRESS NOTES
Mik Valero is a 83 year old male.    HPI:     Chief Complaint   Patient presents with    Follow - Up     6 month f/u, pt denies any concerns continues to do CIC 3x daily       83-year-old male in follow-up to a previous visit 2023.  Has a history of BPH, neurogenic bladder dysfunction and incomplete bladder emptying requiring chronic clean intermittent catheterization 3 times daily using a 14 Macanese straight catheter.  Overall doing well.  No problems or concerns.  No UTIs since he was last seen.  Denies any incontinence.  IPSS score 0 quality-of-life index is 0.  Most recent upper tract imaging kidney ultrasound 2023 showing benign cysts in both kidneys, otherwise no abnormal results.      HISTORY:  Past Medical History:   Diagnosis Date    Coronary atherosclerosis     DVT, lower extremity (HCC)     Esophageal reflux     Hearing impairment     Bilateral hearing aids    High blood pressure     High cholesterol     Other and unspecified hyperlipidemia     Unspecified essential hypertension       Past Surgical History:   Procedure Laterality Date    BACK SURGERY      CATARACT      PROSTATE LASER ENUCLEATION      SKIN SURGERY  2019    Mohs/L superior him/SCC/done by MM    STENT PL SINGLE VES            Family History   Problem Relation Age of Onset    Heart Attack Father     Heart Attack Mother       Social History:   Social History     Socioeconomic History    Marital status:    Tobacco Use    Smoking status: Former     Types: Cigarettes     Quit date: 2001     Years since quittin.2    Smokeless tobacco: Never   Vaping Use    Vaping Use: Never used   Substance and Sexual Activity    Alcohol use: Not Currently    Drug use: No   Other Topics Concern    Caffeine Concern Yes    Exercise No        Medications (Active prior to today's visit):  Current Outpatient Medications   Medication Sig Dispense Refill    GABAPENTIN 300 MG Oral Cap TAKE 1 CAPSULE BY MOUTH THREE  TIMES DAILY 270 capsule 0    gabapentin 300 MG Oral Cap Take 1 capsule (300 mg total) by mouth 3 (three) times daily. 270 capsule 1    simvastatin 40 MG Oral Tab TAKE 1 TABLET(40 MG) BY MOUTH EVERY NIGHT (Patient taking differently: Take 0.5 tablets (20 mg total) by mouth nightly.) 90 tablet 1    amiodarone 200 MG Oral Tab Take 1 tablet (200 mg total) by mouth daily.      spironolactone 25 MG Oral Tab Take 1 tablet (25 mg total) by mouth daily. (Patient not taking: Reported on 12/21/2023)      bumetanide 1 MG Oral Tab Take 0.5 tablets (0.5 mg total) by mouth daily.      carvedilol 6.25 MG Oral Tab Take 1 tablet (6.25 mg total) by mouth 2 (two) times daily with meals. 60 tablet 5    sacubitril-valsartan (ENTRESTO) 24-26 MG Oral Tab Take 1 tablet by mouth 2 (two) times daily. 60 tablet 5    finasteride 5 MG Oral Tab Take 1 tablet (5 mg total) by mouth daily. 90 tablet 3    OMEPRAZOLE 40 MG Oral Capsule Delayed Release TAKE 1 CAPSULE BY MOUTH TWICE DAILY (Patient taking differently: Take 1 capsule (40 mg total) by mouth daily.) 180 capsule 1    clopidogrel 75 MG Oral Tab Take 1 tablet (75 mg total) by mouth daily.      Ascorbic Acid (VITAMIN C) 1000 MG Oral Tab Take 1 tablet (1,000 mg total) by mouth daily.      hydrocortisone 2.5 % External Ointment Apply to AAs BID for 2 weeks, then hold 2 weeks, repeat PRN. 454 g 2    acetaminophen 500 MG Oral Tab Take 2 tablets (1,000 mg total) by mouth every 8 (eight) hours as needed.      Vitamin D3 2000 units Oral Cap Take 1 capsule (2,000 Units total) by mouth daily.      Multiple Vitamins-Minerals (CENTRUM SILVER) Oral Tab Take 1 tablet by mouth daily.      aspirin 81 MG Oral Chew Tab Chew 1 tablet (81 mg total) by mouth daily.      Ferrous Sulfate (IRON) 325 (65 Fe) MG Oral Tab Take 325 mg by mouth daily.        Psyllium (METAMUCIL OR) Take 1 packet by mouth daily.           Allergies:  No Known Allergies      ROS:       PHYSICAL EXAM:        ASSESSMENT/PLAN:   Assessment    Encounter Diagnoses   Name Primary?    Recurrent UTI Yes    Urinary retention     Benign prostatic hyperplasia with lower urinary tract symptoms, symptom details unspecified      Reviewed findings at length with patient.  Recommended follow-up in a yearly basis.  He will call if he has any problems or concerns.           Orders This Visit:  No orders of the defined types were placed in this encounter.      Meds This Visit:  Requested Prescriptions      No prescriptions requested or ordered in this encounter       Imaging & Referrals:  None     4/4/2024  Dora Smith MD

## 2024-04-24 ENCOUNTER — OFFICE VISIT (OUTPATIENT)
Dept: INTERNAL MEDICINE CLINIC | Facility: CLINIC | Age: 84
End: 2024-04-24
Payer: MEDICARE

## 2024-04-24 VITALS
BODY MASS INDEX: 28 KG/M2 | OXYGEN SATURATION: 98 % | WEIGHT: 177 LBS | TEMPERATURE: 98 F | DIASTOLIC BLOOD PRESSURE: 78 MMHG | SYSTOLIC BLOOD PRESSURE: 138 MMHG | HEART RATE: 60 BPM | RESPIRATION RATE: 18 BRPM

## 2024-04-24 DIAGNOSIS — R73.9 BLOOD GLUCOSE ELEVATED: ICD-10-CM

## 2024-04-24 DIAGNOSIS — M54.16 LUMBAR RADICULOPATHY: ICD-10-CM

## 2024-04-24 DIAGNOSIS — N18.32 STAGE 3B CHRONIC KIDNEY DISEASE (HCC): Primary | ICD-10-CM

## 2024-04-24 DIAGNOSIS — D64.9 CHRONIC ANEMIA: ICD-10-CM

## 2024-04-24 DIAGNOSIS — I50.22 CHRONIC SYSTOLIC (CONGESTIVE) HEART FAILURE (HCC): ICD-10-CM

## 2024-04-24 PROBLEM — I25.10 ARTERIOSCLEROSIS OF CORONARY ARTERY: Status: ACTIVE | Noted: 2022-10-25

## 2024-04-24 PROBLEM — E87.20 LACTIC ACIDOSIS: Status: ACTIVE | Noted: 2023-08-01

## 2024-04-24 PROBLEM — N13.8 BPH WITH URINARY OBSTRUCTION: Status: ACTIVE | Noted: 2024-04-24

## 2024-04-24 PROBLEM — N30.00 ACUTE CYSTITIS WITHOUT HEMATURIA: Status: ACTIVE | Noted: 2021-05-05

## 2024-04-24 PROBLEM — I21.4 NSTEMI (NON-ST ELEVATED MYOCARDIAL INFARCTION) (HCC): Status: ACTIVE | Noted: 2023-08-01

## 2024-04-24 PROBLEM — R93.1 ELEVATED CORONARY ARTERY CALCIUM SCORE: Status: ACTIVE | Noted: 2022-08-24

## 2024-04-24 PROBLEM — D72.829 LEUKOCYTOSIS: Status: ACTIVE | Noted: 2023-08-01

## 2024-04-24 PROBLEM — R00.1 BRADYCARDIA: Status: ACTIVE | Noted: 2023-03-07

## 2024-04-24 PROBLEM — N40.1 BPH WITH URINARY OBSTRUCTION: Status: ACTIVE | Noted: 2024-04-24

## 2024-04-24 PROBLEM — I50.21 ACUTE SYSTOLIC HEART FAILURE (HCC): Status: ACTIVE | Noted: 2023-08-01

## 2024-04-24 PROBLEM — R31.0 GROSS HEMATURIA: Status: ACTIVE | Noted: 2023-08-02

## 2024-04-24 PROBLEM — N18.30 BENIGN HYPERTENSIVE HEART AND CKD, STAGE 3 (GFR 30-59), W CHF (HCC): Status: ACTIVE | Noted: 2023-08-01

## 2024-04-24 PROBLEM — E78.5 HYPERLIPIDEMIA: Status: ACTIVE | Noted: 2024-04-24

## 2024-04-24 PROBLEM — R79.89 SERUM CREATININE RAISED: Status: ACTIVE | Noted: 2023-08-02

## 2024-04-24 PROBLEM — I10 HYPERTENSION: Status: ACTIVE | Noted: 2022-08-24

## 2024-04-24 PROBLEM — J96.01 ACUTE RESPIRATORY FAILURE WITH HYPOXIA (HCC): Status: ACTIVE | Noted: 2023-08-01

## 2024-04-24 PROBLEM — R33.9 RETENTION OF URINE: Status: ACTIVE | Noted: 2023-08-02

## 2024-04-24 PROBLEM — I13.0 BENIGN HYPERTENSIVE HEART AND CKD, STAGE 3 (GFR 30-59), W CHF (HCC): Status: ACTIVE | Noted: 2023-08-01

## 2024-04-24 PROCEDURE — 96127 BRIEF EMOTIONAL/BEHAV ASSMT: CPT | Performed by: INTERNAL MEDICINE

## 2024-04-24 PROCEDURE — 99214 OFFICE O/P EST MOD 30 MIN: CPT | Performed by: INTERNAL MEDICINE

## 2024-04-24 RX ORDER — METFORMIN HYDROCHLORIDE 750 MG/1
TABLET, EXTENDED RELEASE ORAL
COMMUNITY
Start: 2024-02-26 | End: 2024-04-24 | Stop reason: ALTCHOICE

## 2024-04-24 NOTE — PROGRESS NOTES
Mik Valero is a 83 year old male.    Chief Complaint   Patient presents with    Follow - Up     Chronic issues       HPI:     Patient with CHF, CAD, CKD, pre dm, HTN, lumbar radiculopathy, BPH, neurogenic bladder here for follow up-  He is doing well, leg pain is tolerable and he is ambulating without issues. No significant back pain. No weakness/tingling. On gabapentin and tylenol prn  CKD has improved, last Cr 1.79.  chronic anemia stable at 9.4.  On Bumex daily for CHF. No CP/SOB. He has cardiology f/u scheduled in July  Pre dm has resolved, he stopped and then restarted metformin. Discussed to stop completely due to CKD. Will monitor hgba1c off the metformin       Patient Active Problem List   Diagnosis    Gastroesophageal reflux disease without esophagitis    IFG (impaired fasting glucose)    Benign essential HTN    Dyslipidemia    Lumbar radiculopathy    Acute cystitis without hematuria    Stage 3b chronic kidney disease (HCC)    Chronic anemia    Spinal stenosis of lumbar region    Blood glucose elevated    Acute systolic heart failure (HCC)    Chronic systolic (congestive) heart failure (HCC)    Acute respiratory failure with hypoxia (HCC)    Arteriosclerosis of coronary artery    Elevated coronary artery calcium score    BPH with urinary obstruction    Benign hypertensive heart and CKD, stage 3 (GFR 30-59), w CHF (HCC)    Bradycardia    Gross hematuria    Hyperlipidemia    Hypertension    Lactic acidosis    Leukocytosis    NSTEMI (non-ST elevated myocardial infarction) (HCC)    Retention of urine    Serum creatinine raised    Chronic HFrEF (heart failure with reduced ejection fraction) (HCC)     Current Outpatient Medications   Medication Sig Dispense Refill    GABAPENTIN 300 MG Oral Cap TAKE 1 CAPSULE BY MOUTH THREE TIMES DAILY 270 capsule 0    simvastatin 40 MG Oral Tab TAKE 1 TABLET(40 MG) BY MOUTH EVERY NIGHT (Patient taking differently: Take 0.5 tablets (20 mg total) by mouth nightly.) 90  tablet 1    amiodarone 200 MG Oral Tab Take 1 tablet (200 mg total) by mouth daily.      bumetanide 1 MG Oral Tab Take 0.5 tablets (0.5 mg total) by mouth daily.      carvedilol 6.25 MG Oral Tab Take 1 tablet (6.25 mg total) by mouth 2 (two) times daily with meals. 60 tablet 5    sacubitril-valsartan (ENTRESTO) 24-26 MG Oral Tab Take 1 tablet by mouth 2 (two) times daily. 60 tablet 5    finasteride 5 MG Oral Tab Take 1 tablet (5 mg total) by mouth daily. 90 tablet 3    OMEPRAZOLE 40 MG Oral Capsule Delayed Release TAKE 1 CAPSULE BY MOUTH TWICE DAILY (Patient taking differently: Take 1 capsule (40 mg total) by mouth daily.) 180 capsule 1    clopidogrel 75 MG Oral Tab Take 1 tablet (75 mg total) by mouth daily.      Ascorbic Acid (VITAMIN C) 1000 MG Oral Tab Take 1 tablet (1,000 mg total) by mouth daily.      acetaminophen 500 MG Oral Tab Take 2 tablets (1,000 mg total) by mouth every 8 (eight) hours as needed.      Vitamin D3 2000 units Oral Cap Take 50 mg by mouth daily.      Multiple Vitamins-Minerals (CENTRUM SILVER) Oral Tab Take 1 tablet by mouth daily.      aspirin 81 MG Oral Chew Tab Chew 1 tablet (81 mg total) by mouth daily.      Ferrous Sulfate (IRON) 325 (65 Fe) MG Oral Tab Take 325 mg by mouth daily.        Psyllium (METAMUCIL OR) Take 1 packet by mouth daily.          Past Medical History:    Coronary atherosclerosis    DVT, lower extremity (HCC)    Esophageal reflux    Hearing impairment    Bilateral hearing aids    High blood pressure    High cholesterol    Other and unspecified hyperlipidemia    Unspecified essential hypertension      Social History:  Social History     Socioeconomic History    Marital status:    Tobacco Use    Smoking status: Former     Current packs/day: 0.00     Types: Cigarettes     Quit date: 2001     Years since quittin.3    Smokeless tobacco: Never   Vaping Use    Vaping status: Never Used   Substance and Sexual Activity    Alcohol use: Yes     Comment: once a  week    Drug use: No   Other Topics Concern    Caffeine Concern Yes    Exercise No     Social Determinants of Health     Food Insecurity: No Food Insecurity (12/6/2023)    Received from [facility], [facility]    Food Insecurity     Currently or in the past 3 months, have you worried your food would run out before you had money to buy more?: No     In the past 12 months, have you run out of food or been unable to get more?: No   Transportation Needs: No Transportation Needs (12/6/2023)    Received from [facility], [facility]    Transportation Needs     Medical Transportation Needs?: No    Received from [facility], [facility]    Housing Stability     Family History   Problem Relation Age of Onset    Heart Attack Father     Heart Attack Mother         Allergies  No Known Allergies      REVIEW OF SYSTEMS:   GENERAL HEALTH:  no fevers   RESPIRATORY: no cough  CARDIOVASCULAR: denies chest pain  GI: denies abdominal pain  : no dysuria  NEURO: denies headaches  PSYCH: No reported depression   HEME: No adenopathy      EXAM:   /78   Pulse 60   Temp 97.6 °F (36.4 °C)   Resp 18   Wt 177 lb (80.3 kg)   SpO2 98%   BMI 28.11 kg/m²   GENERAL: well developed, well nourished,in no apparent distress  HEENT: atraumatic, normocephalic  NECK: supple,no adenopathy  LUNGS: normal rate without respiratory distress, lungs clear to auscultation  CARDIO: RRR nl S1 S2  GI: normal bowel sounds, soft, NT/ND  EXTREMITIES: no cyanosis, clubbing or edema  NEURO: Alert and oriented    ASSESSMENT AND PLAN:     Encounter Diagnoses   Name     Stage 3b chronic kidney disease (HCC)- stable, patient advised to stop metformin completely. Monitor renal panel     Chronic anemia- stable, likely related to CKD, will continue to monitor     Lumbar radiculopathy- no back pain and leg pain is tolerable, continue gabapentin     Blood  glucose elevated, pre dm- resolved, will monitor hgba1c off the metformin     Chronic systolic (congestive) heart failure (HCC)- compensated, he has f/u in July with cardiology        Orders Placed This Encounter   Procedures    Hemoglobin A1C [E]    CBC W Differential W Platelet [E]    Basic Metabolic Panel (8) [E]       Meds & Refills for this Visit:  Requested Prescriptions      No prescriptions requested or ordered in this encounter       Imaging & Consults:  None    Return in about 6 months (around 10/24/2024), or if symptoms worsen or fail to improve, for wellness.  There are no Patient Instructions on file for this visit.      The patient indicates understanding of these issues and agrees to the plan.

## 2024-05-08 ENCOUNTER — LAB ENCOUNTER (OUTPATIENT)
Dept: LAB | Age: 84
End: 2024-05-08
Attending: INTERNAL MEDICINE
Payer: MEDICARE

## 2024-05-08 DIAGNOSIS — E78.5 DYSLIPIDEMIA: ICD-10-CM

## 2024-05-08 DIAGNOSIS — R63.5 ABNORMAL WEIGHT GAIN: Primary | ICD-10-CM

## 2024-05-08 LAB — TSI SER-ACNC: 1.98 MIU/ML (ref 0.36–3.74)

## 2024-05-08 PROCEDURE — 84443 ASSAY THYROID STIM HORMONE: CPT

## 2024-05-08 PROCEDURE — 36415 COLL VENOUS BLD VENIPUNCTURE: CPT

## 2024-05-21 NOTE — PAT NURSING NOTE
Preprocedure Instructions     Pre-Procedure Instructions: Encouraged to check in electronically via [employer]     Visitor Instructions     Adult Patients: 2 Adult Care Partners can accompany the patient day of procedure. 2 Care Partners may visit [phone] during inpatient stay     PreOp Instructions     You are scheduled for: a Cardiac Procedure     Date of Procedure: 10/06/23     Diet Instructions: Do not eat or drink anything after midnight     Medications: Take an Aspirin 81 mg tablet the day of your procedure; Take Plavix 75mg the day of your procedure;Medications you are allowed to take can be taken with a sip of water the morning of your procedure     Medications to Stop: Hold herbal supplements and vitamins on day of procedure     Other Medications: Please hold Bumex/Bumetanide morning of procedure (do not take)    Diabetic Instructions: Metformin needs to be held 24 hours prior to procedure, your last dose should be the morning dose the day before procedure; Do not take morning dose of your diabetic medications; If you wear a CGM (continuous glucose monitor), you will need to remove the entire device (sensor/transmitter) and leave at home prior to your procedure     Skin Prep: Shower with antibacterial soap using a clean washcloth, prior to procedure, clean wrists and groin areas     Arrival Time: You will receive a call the afternoon before your procedure between 3 pm to 5 pm on what time you should arrive the day of your procedure    Driving After Procedure: If sedation is given, you WILL NOT be able to drive home. You will need a responsible adult  to drive you home. ;Cannot take uber or cab unless approved by physician     Discharge Teaching: Your nurse will give you specific instructions before discharge; Most people can resume normal activities in 2-3 days; Any questions, please call the physician's office     Park in the [facility] at [address] in at the [facility] Take the steroid prescribed to you today as directed.    See primary care, cardiology, and pulmonology for follow-up.    Return to the ER if you develop worsening symptoms, difficulty breathing, worsening or moving chest pain or pressure, lightheadedness or fainting, or any emergent concerns.   desk. Our  will be there to check you in for your procedure. Please bring your insurance cards and ID with you.  parking is available starting at 6 am.                                                                                                                                         Please DO NOT respond to this message, the inbasket is not monitored for messages. For any questions, please call the physician's office.

## 2024-06-06 ENCOUNTER — LAB ENCOUNTER (OUTPATIENT)
Dept: LAB | Age: 84
End: 2024-06-06
Attending: INTERNAL MEDICINE
Payer: MEDICARE

## 2024-06-06 DIAGNOSIS — R06.09 DOE (DYSPNEA ON EXERTION): Primary | ICD-10-CM

## 2024-06-06 DIAGNOSIS — R63.5 ABNORMAL WEIGHT GAIN: ICD-10-CM

## 2024-06-06 LAB
ALBUMIN SERPL-MCNC: 3.5 G/DL (ref 3.4–5)
ALBUMIN/GLOB SERPL: 0.9 {RATIO} (ref 1–2)
ALP LIVER SERPL-CCNC: 69 U/L
ALT SERPL-CCNC: 31 U/L
ANION GAP SERPL CALC-SCNC: 6 MMOL/L (ref 0–18)
AST SERPL-CCNC: 17 U/L (ref 15–37)
BILIRUB SERPL-MCNC: 0.5 MG/DL (ref 0.1–2)
BUN BLD-MCNC: 36 MG/DL (ref 9–23)
CALCIUM BLD-MCNC: 8.7 MG/DL (ref 8.5–10.1)
CHLORIDE SERPL-SCNC: 109 MMOL/L (ref 98–112)
CO2 SERPL-SCNC: 27 MMOL/L (ref 21–32)
CREAT BLD-MCNC: 1.96 MG/DL
EGFRCR SERPLBLD CKD-EPI 2021: 34 ML/MIN/1.73M2 (ref 60–?)
FASTING STATUS PATIENT QL REPORTED: NO
GLOBULIN PLAS-MCNC: 3.7 G/DL (ref 2.8–4.4)
GLUCOSE BLD-MCNC: 110 MG/DL (ref 70–99)
NT-PROBNP SERPL-MCNC: 2337 PG/ML (ref ?–450)
OSMOLALITY SERPL CALC.SUM OF ELEC: 303 MOSM/KG (ref 275–295)
POTASSIUM SERPL-SCNC: 3.5 MMOL/L (ref 3.5–5.1)
PROT SERPL-MCNC: 7.2 G/DL (ref 6.4–8.2)
SODIUM SERPL-SCNC: 142 MMOL/L (ref 136–145)

## 2024-06-06 PROCEDURE — 83880 ASSAY OF NATRIURETIC PEPTIDE: CPT

## 2024-06-06 PROCEDURE — 36415 COLL VENOUS BLD VENIPUNCTURE: CPT

## 2024-06-06 PROCEDURE — 80053 COMPREHEN METABOLIC PANEL: CPT

## 2024-06-19 ENCOUNTER — LAB ENCOUNTER (OUTPATIENT)
Dept: LAB | Age: 84
End: 2024-06-19
Attending: PHYSICIAN ASSISTANT

## 2024-06-19 DIAGNOSIS — R06.09 DOE (DYSPNEA ON EXERTION): Primary | ICD-10-CM

## 2024-06-19 DIAGNOSIS — N40.1 BENIGN PROSTATIC HYPERPLASIA WITH LOWER URINARY TRACT SYMPTOMS, SYMPTOM DETAILS UNSPECIFIED: ICD-10-CM

## 2024-06-19 DIAGNOSIS — I25.10 CAD (CORONARY ARTERY DISEASE): ICD-10-CM

## 2024-06-19 LAB
ANION GAP SERPL CALC-SCNC: 6 MMOL/L (ref 0–18)
BUN BLD-MCNC: 39 MG/DL (ref 9–23)
CALCIUM BLD-MCNC: 8.9 MG/DL (ref 8.5–10.1)
CHLORIDE SERPL-SCNC: 110 MMOL/L (ref 98–112)
CO2 SERPL-SCNC: 25 MMOL/L (ref 21–32)
CREAT BLD-MCNC: 1.95 MG/DL
EGFRCR SERPLBLD CKD-EPI 2021: 34 ML/MIN/1.73M2 (ref 60–?)
FASTING STATUS PATIENT QL REPORTED: NO
GLUCOSE BLD-MCNC: 144 MG/DL (ref 70–99)
NT-PROBNP SERPL-MCNC: 2744 PG/ML (ref ?–450)
OSMOLALITY SERPL CALC.SUM OF ELEC: 304 MOSM/KG (ref 275–295)
POTASSIUM SERPL-SCNC: 4 MMOL/L (ref 3.5–5.1)
SODIUM SERPL-SCNC: 141 MMOL/L (ref 136–145)

## 2024-06-19 PROCEDURE — 83880 ASSAY OF NATRIURETIC PEPTIDE: CPT

## 2024-06-19 PROCEDURE — 36415 COLL VENOUS BLD VENIPUNCTURE: CPT

## 2024-06-19 PROCEDURE — 80048 BASIC METABOLIC PNL TOTAL CA: CPT

## 2024-06-19 RX ORDER — FINASTERIDE 5 MG/1
5 TABLET, FILM COATED ORAL DAILY
Qty: 90 TABLET | Refills: 3 | OUTPATIENT
Start: 2024-06-19

## 2024-07-01 ENCOUNTER — LAB ENCOUNTER (OUTPATIENT)
Dept: LAB | Age: 84
End: 2024-07-01
Attending: INTERNAL MEDICINE
Payer: MEDICARE

## 2024-07-01 DIAGNOSIS — I13.0 HYPERTENSIVE HEART AND RENAL DISEASE WITH CONGESTIVE HEART FAILURE (HCC): Primary | ICD-10-CM

## 2024-07-01 DIAGNOSIS — N40.1 BENIGN PROSTATIC HYPERPLASIA WITH LOWER URINARY TRACT SYMPTOMS, SYMPTOM DETAILS UNSPECIFIED: ICD-10-CM

## 2024-07-01 LAB
ANION GAP SERPL CALC-SCNC: 7 MMOL/L (ref 0–18)
BUN BLD-MCNC: 51 MG/DL (ref 9–23)
CALCIUM BLD-MCNC: 9.2 MG/DL (ref 8.5–10.1)
CHLORIDE SERPL-SCNC: 108 MMOL/L (ref 98–112)
CO2 SERPL-SCNC: 28 MMOL/L (ref 21–32)
CREAT BLD-MCNC: 2.15 MG/DL
EGFRCR SERPLBLD CKD-EPI 2021: 30 ML/MIN/1.73M2 (ref 60–?)
FASTING STATUS PATIENT QL REPORTED: NO
GLUCOSE BLD-MCNC: 151 MG/DL (ref 70–99)
OSMOLALITY SERPL CALC.SUM OF ELEC: 313 MOSM/KG (ref 275–295)
POTASSIUM SERPL-SCNC: 4.4 MMOL/L (ref 3.5–5.1)
SODIUM SERPL-SCNC: 143 MMOL/L (ref 136–145)

## 2024-07-01 PROCEDURE — 36415 COLL VENOUS BLD VENIPUNCTURE: CPT

## 2024-07-01 PROCEDURE — 80048 BASIC METABOLIC PNL TOTAL CA: CPT

## 2024-07-01 RX ORDER — FINASTERIDE 5 MG/1
5 TABLET, FILM COATED ORAL DAILY
Qty: 90 TABLET | Refills: 3 | Status: SHIPPED | OUTPATIENT
Start: 2024-07-01

## 2024-07-01 NOTE — TELEPHONE ENCOUNTER
Refill for finasteride sent to pharmacy per protocol.     Benign Prostatic Hypertrophy Medications      Protocol Criteria:  Appointment scheduled in the past 12 months or in the next 2 months  If patients is between the ages of 50 and 70 then PSA done within the last 2 years and is either  Less than or equal to 4.0 ng/ml  Or if greater than 4.0 there is no significant increase (>0.5 ng/ml) in PSA over the last 2 years    Recent Outpatient Visits              2 months ago Stage 3b chronic kidney disease ([facility])    [facility], [address] [place] [address] Jaja Caba MD    Office Visit    2 months ago Recurrent UTI    [facility], Dora Escobar MD    Office Visit    6 months ago Stage 3b chronic kidney disease ([facility])    [address] Jaja Caba MD    Office Visit    6 months ago ANITRA (acute kidney injury) ([facility])    [facility]ph[facility] Jennifer Clancy MD    Office Visit    8 months ago Medicare annual wellness visit, subsequent    [address] Jaja Caba MD    Office Visit          Future Appointments         Provider Department Appt Notes    In 4 days Michael Ford MD [facility], [facility] ear cleaning    In 2 months Jennifer Clancy MD [facility] Nephrology Ref'd by back Dr. Cai due to coordinating care with Cardiologist                PSA:    Lab Results   Component Value Date    PSA 0.70 01/28/2021    PSA 5.76 (H) 04/06/2012       PSA Screen:  No results found for: \"PSAS\"

## 2024-07-03 RX ORDER — OMEPRAZOLE 40 MG/1
CAPSULE, DELAYED RELEASE ORAL
Qty: 180 CAPSULE | Refills: 1 | OUTPATIENT
Start: 2024-07-03

## 2024-07-03 RX ORDER — OMEPRAZOLE 40 MG/1
40 CAPSULE, DELAYED RELEASE ORAL 2 TIMES DAILY
Qty: 180 CAPSULE | Refills: 3 | Status: SHIPPED | OUTPATIENT
Start: 2024-07-03

## 2024-07-03 NOTE — TELEPHONE ENCOUNTER
REFILL PASSED PER [facility] PROTOCOLS     Please review pended refill request as unable to refill due to high/very high drug interaction warning copied here;        High  Drug-Drug: Omeprazole and clopidogrelUse of omeprazole may lead to reduced ability of clopidogrel to inhibit platelet aggregation and increase the risk of subsequent cardiovascular events. Coadministration of omeprazole and clopidogrel should be avoided according to the clopidogrel prescribing information. However, according to an expert consensus document, the benefits of omeprazole may outweigh the risk of the potential reduction in cardiovascular efficacy in patients receiving clopidogrel who are at high risk for gastrointestinal bleeding.  Details    Requested Prescriptions   Pending Prescriptions Disp Refills    Omeprazole 40 MG Oral Capsule Delayed Release 180 capsule 1     Sig: Take 1 capsule (40 mg total) by mouth 2 (two) times daily.       Gastrointestional Medication Protocol Passed - 7/1/2024  9:01 AM        Passed - In person appointment or virtual visit in the past 12 mos or appointment in next 3 mos     Recent Outpatient Visits              2 months ago Stage 3b chronic kidney disease (HCC)    [facility], [address] [place] [address], Jaja Caba MD    Office Visit    3 months ago Recurrent UTI    [facility] Dora Smith MD    Office Visit    6 months ago Stage 3b chronic kidney disease ([facility])    [address]    Office Visit    6 months ago ANITRA (acute kidney injury) ([facility])    [facility] Nephrology Jennifer Clancy MD    Office Visit    8 months ago Medicare annual wellness visit, subsequent    [address] MD    Office Visit          Future Appointments         Provider Department Appt Notes    In 2 days Michael Ford MD [facility]  [facility], [address], [place] ear cleaning    In 2 months Jennifer Clancy MD [facility] Nephrology Ref'd by back Dr. Cai due to coordinating care with Cardiologist                         Future Appointments         Provider Department Appt Notes    In 2 days Michael Ford MD [facility], [address], [place] ear cleaning    In 2 months Jennifer Clancy MD [facility] Nephrology Ref'd by back Dr. Cai due to coordinating care with Cardiologist          Recent Outpatient Visits              2 months ago Stage 3b chronic kidney disease (HCC)    [facility], [address] [place] [address], Jaja Caba MD    Office Visit    3 months ago Recurrent UTI    [facility], [place] Dora Smith MD    Office Visit    6 months ago Stage 3b chronic kidney disease (HCC)    [facility], [address] [place] [address], Jaja Caba MD    Office Visit    6 months ago ANITRA (acute kidney injury) ([facility])    [facility] Nephrology Jennifer Clancy MD    Office Visit    8 months ago Medicare annual wellness visit, subsequent    [facility], [address] [place] [address], Jaja Caba MD    Office Visit

## 2024-07-05 ENCOUNTER — OFFICE VISIT (OUTPATIENT)
Facility: LOCATION | Age: 84
End: 2024-07-05
Payer: MEDICARE

## 2024-07-05 DIAGNOSIS — H61.23 BILATERAL IMPACTED CERUMEN: Primary | ICD-10-CM

## 2024-07-05 PROCEDURE — 99214 OFFICE O/P EST MOD 30 MIN: CPT | Performed by: OTOLARYNGOLOGY

## 2024-07-05 PROCEDURE — 92504 EAR MICROSCOPY EXAMINATION: CPT | Performed by: OTOLARYNGOLOGY

## 2024-07-05 NOTE — PROGRESS NOTES
Mik Valero is a 83 year old male. No chief complaint on file.    HPI:   83-year-old white male wears hearing aids wax impairs the function here for annual cleaning.  No otorrhea, otalgia, tinnitus, or vertigo.  Current Outpatient Medications   Medication Sig Dispense Refill    Omeprazole 40 MG Oral Capsule Delayed Release Take 1 capsule (40 mg total) by mouth 2 (two) times daily. 180 capsule 3    finasteride 5 MG Oral Tab Take 1 tablet (5 mg total) by mouth daily. 90 tablet 3    GABAPENTIN 300 MG Oral Cap TAKE 1 CAPSULE BY MOUTH THREE TIMES DAILY 270 capsule 0    simvastatin 40 MG Oral Tab TAKE 1 TABLET(40 MG) BY MOUTH EVERY NIGHT (Patient taking differently: Take 0.5 tablets (20 mg total) by mouth nightly.) 90 tablet 1    amiodarone 200 MG Oral Tab Take 1 tablet (200 mg total) by mouth daily.      bumetanide 1 MG Oral Tab Take 0.5 tablets (0.5 mg total) by mouth daily.      carvedilol 6.25 MG Oral Tab Take 1 tablet (6.25 mg total) by mouth 2 (two) times daily with meals. 60 tablet 5    sacubitril-valsartan (ENTRESTO) 24-26 MG Oral Tab Take 1 tablet by mouth 2 (two) times daily. 60 tablet 5    clopidogrel 75 MG Oral Tab Take 1 tablet (75 mg total) by mouth daily.      Ascorbic Acid (VITAMIN C) 1000 MG Oral Tab Take 1 tablet (1,000 mg total) by mouth daily.      acetaminophen 500 MG Oral Tab Take 2 tablets (1,000 mg total) by mouth every 8 (eight) hours as needed.      Vitamin D3 2000 units Oral Cap Take 50 mg by mouth daily.      Multiple Vitamins-Minerals (CENTRUM SILVER) Oral Tab Take 1 tablet by mouth daily.      aspirin 81 MG Oral Chew Tab Chew 1 tablet (81 mg total) by mouth daily.      Ferrous Sulfate (IRON) 325 (65 Fe) MG Oral Tab Take 325 mg by mouth daily.        Psyllium (METAMUCIL OR) Take 1 packet by mouth daily.          Past Medical History:    Coronary atherosclerosis    DVT, lower extremity (HCC)    Esophageal reflux    Hearing impairment    Bilateral hearing aids    High blood pressure     High cholesterol    Other and unspecified hyperlipidemia    Unspecified essential hypertension      Social History:  Social History     Socioeconomic History    Marital status:    Tobacco Use    Smoking status: Former     Current packs/day: 0.00     Types: Cigarettes     Quit date: 2001     Years since quittin.5    Smokeless tobacco: Never   Vaping Use    Vaping status: Never Used   Substance and Sexual Activity    Alcohol use: Yes     Comment: once a week    Drug use: No   Other Topics Concern    Caffeine Concern Yes    Exercise No     Social Determinants of Health      [facility], [facility]    Housing Stability      Past Surgical History:   Procedure Laterality Date    Back surgery      Cataract      Prostate laser enucleation      Skin surgery  2019    Mohs/L superior him/SCC/done by MM    Stent pl single ves               REVIEW OF SYSTEMS:   GENERAL HEALTH: feels well otherwise  GENERAL : denies fever, chills, sweats, weight loss, weight gain  SKIN: denies any unusual skin lesions or rashes  RESPIRATORY: denies shortness of breath with exertion  NEURO: denies headaches    EXAM:   There were no vitals taken for this visit.    System Pertinent findings Details   Constitutional  Overall appearance - Normal.   Head/Face  Facial features -- Normal. Skull - Normal.   Eyes  Pupils equal ,round ,react to light and accomidate   Ears  External Ear Right: Normal, Left: Normal. Canal - Right: Normal, Left: Normal. TM - Right: Wax removed TM normal left: Wax removed TM normal   Nose  External Nose, Normal, Septum -midline,Nasal Vault, clear. Turbinates - Right: Normal left: Normal   Mouth/Throat  Lips/teeth/gums - Normal. Tonsils -1+ oropharynx - Normal.   Neck Exam  Inspection - Normal. Palpation - Normal. Parotid gland - Normal. Thyroid gland -normal   Lymph Detail  Submental. Submandibular. Anterior cervical. Posterior cervical.  Supraclavicular.   Patients exam showed wax impaction right ear, wax impaction left ear. Ear wax was removed under the operative microscope. No complications. Patient tolerated the procedure well. Ear exam findings listed in note after removal.      ASSESSMENT AND PLAN:   1. Bilateral impacted cerumen  1 year follow-up no audiogram necessary      The patient indicates understanding of these issues and agrees to the plan.      Michael Ford MD  7/5/2024  9:21 AM

## 2024-07-23 ENCOUNTER — LAB ENCOUNTER (OUTPATIENT)
Dept: LAB | Age: 84
End: 2024-07-23
Attending: INTERNAL MEDICINE
Payer: MEDICARE

## 2024-07-23 DIAGNOSIS — N18.32 STAGE 3B CHRONIC KIDNEY DISEASE (HCC): ICD-10-CM

## 2024-07-23 DIAGNOSIS — D64.9 CHRONIC ANEMIA: ICD-10-CM

## 2024-07-23 DIAGNOSIS — R73.9 BLOOD GLUCOSE ELEVATED: ICD-10-CM

## 2024-07-23 LAB
ANION GAP SERPL CALC-SCNC: 2 MMOL/L (ref 0–18)
BASOPHILS # BLD AUTO: 0.05 X10(3) UL (ref 0–0.2)
BASOPHILS NFR BLD AUTO: 0.7 %
BUN BLD-MCNC: 38 MG/DL (ref 9–23)
CALCIUM BLD-MCNC: 9.6 MG/DL (ref 8.7–10.4)
CHLORIDE SERPL-SCNC: 109 MMOL/L (ref 98–112)
CO2 SERPL-SCNC: 28 MMOL/L (ref 21–32)
CREAT BLD-MCNC: 1.77 MG/DL
EGFRCR SERPLBLD CKD-EPI 2021: 38 ML/MIN/1.73M2 (ref 60–?)
EOSINOPHIL # BLD AUTO: 0.31 X10(3) UL (ref 0–0.7)
EOSINOPHIL NFR BLD AUTO: 4.3 %
ERYTHROCYTE [DISTWIDTH] IN BLOOD BY AUTOMATED COUNT: 15.7 %
EST. AVERAGE GLUCOSE BLD GHB EST-MCNC: 111 MG/DL (ref 68–126)
FASTING STATUS PATIENT QL REPORTED: NO
GLUCOSE BLD-MCNC: 114 MG/DL (ref 70–99)
HBA1C MFR BLD: 5.5 % (ref ?–5.7)
HCT VFR BLD AUTO: 30.6 %
HGB BLD-MCNC: 9.5 G/DL
IMM GRANULOCYTES # BLD AUTO: 0.02 X10(3) UL (ref 0–1)
IMM GRANULOCYTES NFR BLD: 0.3 %
LYMPHOCYTES # BLD AUTO: 1.48 X10(3) UL (ref 1–4)
LYMPHOCYTES NFR BLD AUTO: 20.7 %
MCH RBC QN AUTO: 20.7 PG (ref 26–34)
MCHC RBC AUTO-ENTMCNC: 31 G/DL (ref 31–37)
MCV RBC AUTO: 66.5 FL
MONOCYTES # BLD AUTO: 0.78 X10(3) UL (ref 0.1–1)
MONOCYTES NFR BLD AUTO: 10.9 %
NEUTROPHILS # BLD AUTO: 4.52 X10 (3) UL (ref 1.5–7.7)
NEUTROPHILS # BLD AUTO: 4.52 X10(3) UL (ref 1.5–7.7)
NEUTROPHILS NFR BLD AUTO: 63.1 %
OSMOLALITY SERPL CALC.SUM OF ELEC: 298 MOSM/KG (ref 275–295)
PLATELET # BLD AUTO: 219 10(3)UL (ref 150–450)
PLATELETS.RETICULATED NFR BLD AUTO: 8.8 % (ref 0–7)
POTASSIUM SERPL-SCNC: 4 MMOL/L (ref 3.5–5.1)
RBC # BLD AUTO: 4.6 X10(6)UL
SODIUM SERPL-SCNC: 139 MMOL/L (ref 136–145)
WBC # BLD AUTO: 7.2 X10(3) UL (ref 4–11)

## 2024-07-23 PROCEDURE — 36415 COLL VENOUS BLD VENIPUNCTURE: CPT

## 2024-07-23 PROCEDURE — 85025 COMPLETE CBC W/AUTO DIFF WBC: CPT

## 2024-07-23 PROCEDURE — 83036 HEMOGLOBIN GLYCOSYLATED A1C: CPT

## 2024-07-23 PROCEDURE — 80048 BASIC METABOLIC PNL TOTAL CA: CPT

## 2024-09-03 RX ORDER — GABAPENTIN 300 MG/1
300 CAPSULE ORAL 3 TIMES DAILY
Qty: 270 CAPSULE | Refills: 0 | OUTPATIENT
Start: 2024-09-03

## 2024-09-04 ENCOUNTER — OFFICE VISIT (OUTPATIENT)
Dept: NEPHROLOGY | Facility: CLINIC | Age: 84
End: 2024-09-04
Payer: MEDICARE

## 2024-09-04 ENCOUNTER — TELEPHONE (OUTPATIENT)
Dept: NEPHROLOGY | Facility: CLINIC | Age: 84
End: 2024-09-04

## 2024-09-04 VITALS — DIASTOLIC BLOOD PRESSURE: 62 MMHG | WEIGHT: 187 LBS | BODY MASS INDEX: 30 KG/M2 | SYSTOLIC BLOOD PRESSURE: 148 MMHG

## 2024-09-04 DIAGNOSIS — I13.0 CARDIORENAL SYNDROME WITH RENAL FAILURE, STAGE 1-4 OR UNSPECIFIED CHRONIC KIDNEY DISEASE, WITH HEART FAILURE (HCC): ICD-10-CM

## 2024-09-04 DIAGNOSIS — I10 PRIMARY HYPERTENSION: ICD-10-CM

## 2024-09-04 DIAGNOSIS — N18.30 STAGE 3 CHRONIC KIDNEY DISEASE, UNSPECIFIED WHETHER STAGE 3A OR 3B CKD (HCC): Primary | ICD-10-CM

## 2024-09-04 PROCEDURE — 99214 OFFICE O/P EST MOD 30 MIN: CPT | Performed by: INTERNAL MEDICINE

## 2024-09-04 NOTE — PROGRESS NOTES
Nephrology Progress Note      ASSESSMENT/PLAN:      1) CKD 3- baseline Cr < 2 mg/dl due to combination of hypertensive / age-related nephrosclerosis + cardiorenal syndrome. Cr higher as expected since addition of entresto + diuretics in 2023 for new onset CHF. UA bland; there is no evidence of any other renal pathology.  Meds are otherwise benign without chronic analgesic or PPI use.  Ultrasound shows only simple cysts without obstruction or mass. PLAN- no further w/u; focus on HF / BP mgmt. Accept modest azotemia to maintain euvolemia with diuretics    2) HFrEF- s/p stent x 3 within the last yr; EF 50% -> 35-40% by echo 8/23 at [facility] and started on coreg / entresto / bumex. Developed UTI shortly after starting jardiance. PLAN- given recent HF exacerbations x 2 + chronic edema + mild GUERIN, will increase bumex to 1 mg daily and recheck labs in 2 weeks.      3) Longstanding HTN / hyperlipidemia     4) CAD as above     5) Self-catheterizes 3x/day x yrs       HPI:   Mik Valero is a 84 year old male who presents for follow-up of   Chief Complaint   Patient presents with    Chronic Kidney Disease     Ref'd back by Dr. Linton       Very pleasant 84-year-old male presents for evaluation of chronic kidney disease; recent creatinine 1.7 mg/dL is essentially at his long-term baseline.  When I last saw him, his creatinine is over 2.5 mg/dL.  Otherwise feels reasonably well; please see above for further details.    HISTORY:  Past Medical History:    Coronary atherosclerosis    DVT, lower extremity (HCC)    Esophageal reflux    Hearing impairment    Bilateral hearing aids    High blood pressure    High cholesterol    Other and unspecified hyperlipidemia    Unspecified essential hypertension      Past Surgical History:   Procedure Laterality Date    Back surgery      Cataract      Prostate laser enucleation      Skin surgery  02/27/2019    Mohs/L superior him/SCC/done by MM    Stent pl single ves      2022      Family  History   Problem Relation Age of Onset    Heart Attack Father     Heart Attack Mother       Social History:   Social History     Socioeconomic History    Marital status:    Tobacco Use    Smoking status: Former     Current packs/day: 0.00     Types: Cigarettes     Quit date: 2001     Years since quittin.6    Smokeless tobacco: Never   Vaping Use    Vaping status: Never Used   Substance and Sexual Activity    Alcohol use: Yes     Comment: once a week    Drug use: No   Other Topics Concern    Caffeine Concern Yes    Exercise No     Social Determinants of Health      Received from [facility], [facility]    Housing Stability        Medications (Active prior to today's visit):  Current Outpatient Medications   Medication Sig Dispense Refill    Omeprazole 40 MG Oral Capsule Delayed Release Take 1 capsule (40 mg total) by mouth 2 (two) times daily. 180 capsule 3    finasteride 5 MG Oral Tab Take 1 tablet (5 mg total) by mouth daily. 90 tablet 3    GABAPENTIN 300 MG Oral Cap TAKE 1 CAPSULE BY MOUTH THREE TIMES DAILY 270 capsule 0    simvastatin 40 MG Oral Tab TAKE 1 TABLET(40 MG) BY MOUTH EVERY NIGHT (Patient taking differently: Take 0.5 tablets (20 mg total) by mouth nightly.) 90 tablet 1    amiodarone 200 MG Oral Tab Take 1 tablet (200 mg total) by mouth daily.      bumetanide 1 MG Oral Tab Take 0.5 tablets (0.5 mg total) by mouth daily.      carvedilol 6.25 MG Oral Tab Take 1 tablet (6.25 mg total) by mouth 2 (two) times daily with meals. 60 tablet 5    sacubitril-valsartan (ENTRESTO) 24-26 MG Oral Tab Take 1 tablet by mouth 2 (two) times daily. 60 tablet 5    clopidogrel 75 MG Oral Tab Take 1 tablet (75 mg total) by mouth daily.      Ascorbic Acid (VITAMIN C) 1000 MG Oral Tab Take 1 tablet (1,000 mg total) by mouth daily.      acetaminophen 500 MG Oral Tab Take 2 tablets (1,000 mg total) by mouth every 8 (eight) hours as needed.      Vitamin D3 2000 units Oral Cap  Take 50 mg by mouth daily.      Multiple Vitamins-Minerals (CENTRUM SILVER) Oral Tab Take 1 tablet by mouth daily.      aspirin 81 MG Oral Chew Tab Chew 1 tablet (81 mg total) by mouth daily.      Ferrous Sulfate (IRON) 325 (65 Fe) MG Oral Tab Take 325 mg by mouth daily.        Psyllium (METAMUCIL OR) Take 1 packet by mouth daily.           Allergies:  No Known Allergies    ROS:     Denies fever/chills  Denies wt loss/gain  Denies HA or visual changes  Denies CP or palpitations  Denies SOB/cough/hemoptysis  Denies abd or flank pain  Denies N/V/D  Denies change in urinary habits or gross hematuria  Denies LE edema  Denies skin rashes/myalgias/arthralgias      PHYSICAL EXAM:   /62 (BP Location: Left arm, Patient Position: Sitting)   Wt 187 lb (84.8 kg)   BMI 29.70 kg/m²   Wt Readings from Last 3 Encounters:   09/04/24 187 lb (84.8 kg)   04/24/24 177 lb (80.3 kg)   12/21/23 178 lb 3.7 oz (80.8 kg)     General: Alert and oriented in no apparent distress.  HEENT: No scleral icterus, MMM  Neck: Supple, no TITI or thyromegaly  Cardiac: Regular rate and rhythm, S1, S2 normal, no murmur or rub  Lungs: Clear without wheezes, rales, rhonchi.    Abdomen: Soft, non-tender. + bowel sounds, no palpable organomegaly  Extremities: Without clubbing, cyanosis or edema.  Neurologic: Alert and oriented, normal affect, cranial nerves grossly intact, moving all extremities  Skin: Warm and dry, no rashes      Jennifer Clancy MD  9/4/2024  10:55 AM

## 2024-09-09 ENCOUNTER — HOSPITAL ENCOUNTER (INPATIENT)
Facility: HOSPITAL | Age: 84
LOS: 2 days | Discharge: HOME | End: 2024-09-11
Attending: EMERGENCY MEDICINE | Admitting: HOSPITALIST
Payer: MEDICARE

## 2024-09-09 ENCOUNTER — APPOINTMENT (OUTPATIENT)
Dept: GENERAL RADIOLOGY | Facility: HOSPITAL | Age: 84
End: 2024-09-09
Payer: MEDICARE

## 2024-09-09 DIAGNOSIS — A41.9 SEPSIS, DUE TO UNSPECIFIED ORGANISM, UNSPECIFIED WHETHER ACUTE ORGAN DYSFUNCTION PRESENT (HCC): Primary | ICD-10-CM

## 2024-09-09 DIAGNOSIS — N39.0 URINARY TRACT INFECTION WITHOUT HEMATURIA, SITE UNSPECIFIED: ICD-10-CM

## 2024-09-09 LAB
ALBUMIN SERPL-MCNC: 4.1 G/DL (ref 3.2–4.8)
ALBUMIN/GLOB SERPL: 1.5 {RATIO} (ref 1–2)
ALP LIVER SERPL-CCNC: 78 U/L
ALT SERPL-CCNC: 30 U/L
ANION GAP SERPL CALC-SCNC: 8 MMOL/L (ref 0–18)
APTT PPP: 26.4 SECONDS (ref 23–36)
AST SERPL-CCNC: 24 U/L (ref ?–34)
BASOPHILS # BLD AUTO: 0.03 X10(3) UL (ref 0–0.2)
BASOPHILS NFR BLD AUTO: 0.2 %
BILIRUB SERPL-MCNC: 0.9 MG/DL (ref 0.2–1.1)
BILIRUB UR QL STRIP.AUTO: NEGATIVE
BUN BLD-MCNC: 46 MG/DL (ref 9–23)
CALCIUM BLD-MCNC: 9.5 MG/DL (ref 8.7–10.4)
CHLORIDE SERPL-SCNC: 106 MMOL/L (ref 98–112)
CO2 SERPL-SCNC: 27 MMOL/L (ref 21–32)
COLOR UR AUTO: YELLOW
CREAT BLD-MCNC: 2.27 MG/DL
EGFRCR SERPLBLD CKD-EPI 2021: 28 ML/MIN/1.73M2 (ref 60–?)
EOSINOPHIL # BLD AUTO: 0.01 X10(3) UL (ref 0–0.7)
EOSINOPHIL NFR BLD AUTO: 0.1 %
ERYTHROCYTE [DISTWIDTH] IN BLOOD BY AUTOMATED COUNT: 15.5 %
FLUAV + FLUBV RNA SPEC NAA+PROBE: NEGATIVE
FLUAV + FLUBV RNA SPEC NAA+PROBE: NEGATIVE
GLOBULIN PLAS-MCNC: 2.8 G/DL (ref 2–3.5)
GLUCOSE BLD-MCNC: 133 MG/DL (ref 70–99)
GLUCOSE UR STRIP.AUTO-MCNC: NORMAL MG/DL
HCT VFR BLD AUTO: 31 %
HGB BLD-MCNC: 10 G/DL
HYALINE CASTS #/AREA URNS AUTO: PRESENT /LPF
IMM GRANULOCYTES # BLD AUTO: 0.08 X10(3) UL (ref 0–1)
IMM GRANULOCYTES NFR BLD: 0.4 %
INR BLD: 1.04 (ref 0.8–1.2)
KETONES UR STRIP.AUTO-MCNC: NEGATIVE MG/DL
LACTATE SERPL-SCNC: 0.8 MMOL/L (ref 0.5–2)
LACTATE SERPL-SCNC: 2.1 MMOL/L (ref 0.5–2)
LACTATE SERPL-SCNC: 2.3 MMOL/L (ref 0.5–2)
LEUKOCYTE ESTERASE UR QL STRIP.AUTO: 500
LYMPHOCYTES # BLD AUTO: 0.35 X10(3) UL (ref 1–4)
LYMPHOCYTES NFR BLD AUTO: 1.8 %
MCH RBC QN AUTO: 20.5 PG (ref 26–34)
MCHC RBC AUTO-ENTMCNC: 32.3 G/DL (ref 31–37)
MCV RBC AUTO: 63.5 FL
MONOCYTES # BLD AUTO: 0.88 X10(3) UL (ref 0.1–1)
MONOCYTES NFR BLD AUTO: 4.6 %
NEUTROPHILS # BLD AUTO: 17.74 X10 (3) UL (ref 1.5–7.7)
NEUTROPHILS # BLD AUTO: 17.74 X10(3) UL (ref 1.5–7.7)
NEUTROPHILS NFR BLD AUTO: 92.9 %
NITRITE UR QL STRIP.AUTO: NEGATIVE
NT-PROBNP SERPL-MCNC: 7878 PG/ML (ref ?–450)
OSMOLALITY SERPL CALC.SUM OF ELEC: 306 MOSM/KG (ref 275–295)
PH UR STRIP.AUTO: 5.5 [PH] (ref 5–8)
PLATELET # BLD AUTO: 181 10(3)UL (ref 150–450)
POTASSIUM SERPL-SCNC: 3.6 MMOL/L (ref 3.5–5.1)
PROT SERPL-MCNC: 6.9 G/DL (ref 5.7–8.2)
PROT UR STRIP.AUTO-MCNC: 300 MG/DL
PROTHROMBIN TIME: 13.6 SECONDS (ref 11.6–14.8)
RBC # BLD AUTO: 4.88 X10(6)UL
RBC #/AREA URNS AUTO: >10 /HPF
RSV RNA SPEC NAA+PROBE: NEGATIVE
SARS-COV-2 RNA RESP QL NAA+PROBE: NOT DETECTED
SODIUM SERPL-SCNC: 141 MMOL/L (ref 136–145)
SP GR UR STRIP.AUTO: 1.02 (ref 1–1.03)
TROPONIN I SERPL HS-MCNC: 41 NG/L
UROBILINOGEN UR STRIP.AUTO-MCNC: 3 MG/DL
WBC # BLD AUTO: 19.1 X10(3) UL (ref 4–11)
WBC #/AREA URNS AUTO: >50 /HPF
WBC CLUMPS UR QL AUTO: PRESENT /HPF

## 2024-09-09 PROCEDURE — 71045 X-RAY EXAM CHEST 1 VIEW: CPT | Performed by: EMERGENCY MEDICINE

## 2024-09-09 PROCEDURE — 99223 1ST HOSP IP/OBS HIGH 75: CPT | Performed by: HOSPITALIST

## 2024-09-09 RX ORDER — MELATONIN
3 NIGHTLY PRN
Status: DISCONTINUED | OUTPATIENT
Start: 2024-09-09 | End: 2024-09-11

## 2024-09-09 RX ORDER — SENNOSIDES 8.6 MG
17.2 TABLET ORAL NIGHTLY PRN
Status: DISCONTINUED | OUTPATIENT
Start: 2024-09-09 | End: 2024-09-11

## 2024-09-09 RX ORDER — BISACODYL 10 MG
10 SUPPOSITORY, RECTAL RECTAL
Status: DISCONTINUED | OUTPATIENT
Start: 2024-09-09 | End: 2024-09-11

## 2024-09-09 RX ORDER — HEPARIN SODIUM 5000 [USP'U]/ML
5000 INJECTION, SOLUTION INTRAVENOUS; SUBCUTANEOUS EVERY 12 HOURS SCHEDULED
Status: DISCONTINUED | OUTPATIENT
Start: 2024-09-09 | End: 2024-09-11

## 2024-09-09 RX ORDER — SODIUM CHLORIDE 9 MG/ML
INJECTION, SOLUTION INTRAVENOUS CONTINUOUS
Status: ACTIVE | OUTPATIENT
Start: 2024-09-09 | End: 2024-09-10

## 2024-09-09 RX ORDER — POLYETHYLENE GLYCOL 3350 17 G/17G
17 POWDER, FOR SOLUTION ORAL DAILY PRN
Status: DISCONTINUED | OUTPATIENT
Start: 2024-09-09 | End: 2024-09-11

## 2024-09-09 RX ORDER — ACETAMINOPHEN 500 MG
500 TABLET ORAL EVERY 4 HOURS PRN
Status: DISCONTINUED | OUTPATIENT
Start: 2024-09-09 | End: 2024-09-11

## 2024-09-09 RX ORDER — METOCLOPRAMIDE HYDROCHLORIDE 5 MG/ML
5 INJECTION INTRAMUSCULAR; INTRAVENOUS EVERY 8 HOURS PRN
Status: DISCONTINUED | OUTPATIENT
Start: 2024-09-09 | End: 2024-09-11

## 2024-09-09 RX ORDER — ONDANSETRON 2 MG/ML
4 INJECTION INTRAMUSCULAR; INTRAVENOUS EVERY 6 HOURS PRN
Status: DISCONTINUED | OUTPATIENT
Start: 2024-09-09 | End: 2024-09-11

## 2024-09-09 NOTE — H&P
[facility]     Mik Valero Patient Status:  Emergency    1940 MRN [id number]   Location [facility] EMERGENCY DEPARTMENT Attending Gavino Weaver MD   Hosp Day # 0 PCP Jaja Karimi MD     Chief Complaint: \"Generalized weakness and unable to stand\"    Subjective:    History of Present Illness:     Mik Valero is a 84 year old male with PMHx CKD3, CAD, HFpEF, HTN, HLD, GERD presents hospital with chief complaints of generalized weakness and x 1 emesis.  Patient said symptoms started yesterday.  Patient was having severe weakness when ambulating.  He had some nausea and x 1 emesis that was nonbloody.  Patient otherwise denies any overt shortness of breath chest pain fevers chills at this time.    -S/p IV cefepime, fluid bolus in the emergency room            History/Other:    Past Medical History:  Past Medical History:    Coronary atherosclerosis    DVT, lower extremity (HCC)    Esophageal reflux    Hearing impairment    Bilateral hearing aids    High blood pressure    High cholesterol    Other and unspecified hyperlipidemia    Unspecified essential hypertension     Past Surgical History:   Past Surgical History:   Procedure Laterality Date    Back surgery      Cataract      Prostate laser enucleation      Skin surgery  2019    Mohs/L superior him/SCC/done by MM    Stent pl single ves            Family History:   Family History   Problem Relation Age of Onset    Heart Attack Father     Heart Attack Mother      Social History:    reports that he quit smoking about 23 years ago. His smoking use included cigarettes. He has never used smokeless tobacco. He reports current alcohol use. He reports that he does not use drugs.     Allergies: No Known Allergies    Medications:    No current facility-administered medications on file prior to encounter.     Current Outpatient Medications on File Prior to Encounter   Medication Sig Dispense Refill     Omeprazole 40 MG Oral Capsule Delayed Release Take 1 capsule (40 mg total) by mouth 2 (two) times daily. 180 capsule 3    finasteride 5 MG Oral Tab Take 1 tablet (5 mg total) by mouth daily. 90 tablet 3    GABAPENTIN 300 MG Oral Cap TAKE 1 CAPSULE BY MOUTH THREE TIMES DAILY 270 capsule 0    simvastatin 40 MG Oral Tab TAKE 1 TABLET(40 MG) BY MOUTH EVERY NIGHT (Patient taking differently: Take 0.5 tablets (20 mg total) by mouth nightly.) 90 tablet 1    amiodarone 200 MG Oral Tab Take 1 tablet (200 mg total) by mouth daily.      bumetanide 1 MG Oral Tab Take 1.5 tablets (1.5 mg total) by mouth daily.      carvedilol 6.25 MG Oral Tab Take 1 tablet (6.25 mg total) by mouth 2 (two) times daily with meals. 60 tablet 5    sacubitril-valsartan (ENTRESTO) 24-26 MG Oral Tab Take 1 tablet by mouth 2 (two) times daily. 60 tablet 5    clopidogrel 75 MG Oral Tab Take 1 tablet (75 mg total) by mouth daily.      Ascorbic Acid (VITAMIN C) 1000 MG Oral Tab Take 1 tablet (1,000 mg total) by mouth daily.      acetaminophen 500 MG Oral Tab Take 2 tablets (1,000 mg total) by mouth every 8 (eight) hours as needed.      Vitamin D3 2000 units Oral Cap Take 50 mg by mouth daily.      Multiple Vitamins-Minerals (CENTRUM SILVER) Oral Tab Take 1 tablet by mouth daily.      aspirin 81 MG Oral Chew Tab Chew 1 tablet (81 mg total) by mouth daily.      Ferrous Sulfate (IRON) 325 (65 Fe) MG Oral Tab Take 325 mg by mouth daily.        Psyllium (METAMUCIL OR) Take 1 packet by mouth daily.           Review of Systems:   A comprehensive review of systems was completed.    Pertinent positives and negatives noted in the HPI.    Objective:   Physical Exam:    /53   Pulse 75   Temp 98.6 °F (37 °C) (Oral)   Resp 22   Ht 5' 7\" (1.702 m)   Wt 180 lb (81.6 kg)   SpO2 96%   BMI 28.19 kg/m²   General: No acute distress, Alert  Respiratory: No rhonchi, no wheezes  Cardiovascular: S1, S2. Regular rate and rhythm  Abdomen: Soft, Non-tender,  non-distended, positive bowel sounds  Neuro: No new focal deficits  Extremities: No edema      Results:    Labs:      Labs Last 24 Hours:    Recent Labs   Lab 09/09/24  1505   RBC 4.88   HGB 10.0*   HCT 31.0*   MCV 63.5*   MCH 20.5*   MCHC 32.3   RDW 15.5   NEPRELIM 17.74*   WBC 19.1*   .0       Recent Labs   Lab 09/09/24  1504   *   BUN 46*   CREATSERUM 2.27*   EGFRCR 28*   CA 9.5   ALB 4.1      K 3.6      CO2 27.0   ALKPHO 78   AST 24   ALT 30   BILT 0.9   TP 6.9       Lab Results   Component Value Date    INR 1.04 09/09/2024    INR 1.01 03/08/2022       Recent Labs   Lab 09/09/24  1504   TROPHS 41       Recent Labs   Lab 09/09/24  1504   PBNP 7,878*       No results for input(s): \"PCT\" in the last 168 hours.    Imaging: Imaging data reviewed in Epic.    Assessment & Plan:      #Complicated UTI  #Sepsis  -IV cefepime (9/9-)  history of Klebsiella resistant to Cefazolin/ceftriaxone in September 2023  -Gentle IV fluids, monitor respiratory status  -Follow-up urine/blood culture  -Etiology: Likely associated with self cath, urology to see    #Acute on CKD 3  #Dehydration  -Creatinine 1.77 as of July 2024  -gentle IV fluids for now    #Increased O2 needs  -On room air at baseline, intermittently needing 2 L nasal cannula, will monitor, wean as tolerated  -Chest x-ray (9/9) negative for acute process    #CAD  #HLD  #HTN    #HFpEF, not in exacerbation  #Elevated BNP  -No clinical signs of fluid overload  -Echo (January 2024) shows LVEF 50-55%, grade 1 diastolic dysfunction    #GERD    #Chronic microcytic anemia    #Recent hospitalization  -5/31-6/3/20/2024 at [facility] for acute flash pulmonary edema    #Amiodarone?  -unsure why pt on it, he denies paf or vtach hx        Plan of care discussed with ER physician & patient    Salvatore Issa,     Supplementary Documentation:     The 21st Century Cures Act makes medical notes like these available to patients in the interest of transparency. Please  be advised this is a medical document. Medical documents are intended to carry relevant information, facts as evident, and the clinical opinion of the practitioner. The medical note is intended as peer to peer communication and may appear blunt or direct. It is written in medical language and may contain abbreviations or verbiage that are unfamiliar.

## 2024-09-09 NOTE — ED INITIAL ASSESSMENT (HPI)
Pt presents to the ED with complaints of generalized weakness, unable to stand. Family report pt did vomit x 1. Pt c/o fatigue. Pt still works and got ready and then symptoms began. Pt does self catheterize 3 times per day. Pt seated in chair with eyes closed, skin w/d,resps appear unlabored.

## 2024-09-09 NOTE — ED PROVIDER NOTES
Patient Seen in: [facility] Emergency Department      History     Chief Complaint   Patient presents with    Altered Mental Status    Shortness Of Breath     Stated Complaint: AMS, lethargic, vomiting    Subjective:   HPI    84-year-old male with a past medical history as below including hypertension, hyperlipidemia, CAD, CHF, CKD presents with generalized weakness starting early this morning.  Wife states he got dressed to go to work and then felt his legs were too weak to walk.  He denies any focal weakness of his arms or legs.  Patient states he vomited once at around 10 AM but denies any abdominal pain or diarrhea..  Wife states he straight caths himself 3 times a day and is felt similar with urinary tract infections.  He denies cough or cold symptoms.  Denies chest pain or shortness of breath.        Objective:   Past Medical History:    Coronary atherosclerosis    DVT, lower extremity (HCC)    Esophageal reflux    Hearing impairment    Bilateral hearing aids    High blood pressure    High cholesterol    Other and unspecified hyperlipidemia    Unspecified essential hypertension              Past Surgical History:   Procedure Laterality Date    Back surgery      Cataract      Prostate laser enucleation      Skin surgery  2019    Mohs/L superior him/SCC/done by MM    Stent pl single ves                      Social History     Socioeconomic History    Marital status:    Tobacco Use    Smoking status: Former     Current packs/day: 0.00     Types: Cigarettes     Quit date: 2001     Years since quittin.7    Smokeless tobacco: Never   Vaping Use    Vaping status: Never Used   Substance and Sexual Activity    Alcohol use: Yes     Comment: once a week    Drug use: No   Other Topics Concern    Caffeine Concern Yes    Exercise No     Social Determinants of Health      [facility], [facility]    Housing Stability              Review of  Systems    Positive for stated Chief Complaint: Altered Mental Status and Shortness Of Breath    Other systems are as noted in HPI.  Constitutional and vital signs reviewed.      All other systems reviewed and negative except as noted above.    Physical Exam     ED Triage Vitals [09/09/24 1437]   /61   Pulse 75   Resp 16   Temp 98.6 °F (37 °C)   Temp src Oral   SpO2 (!) 85 %   O2 Device None (Room air)       Current Vitals:   Vital Signs  BP: 133/49  Pulse: 79  Resp: 17  Temp: 98.6 °F (37 °C)  Temp src: Oral  MAP (mmHg): 72    Oxygen Therapy  SpO2: 98 %  O2 Device: None (Room air)  O2 Flow Rate (L/min): 2 L/min            Physical Exam  Vitals and nursing note reviewed.   Constitutional:       General: He is not in acute distress.     Appearance: He is well-developed. He is not ill-appearing.   HENT:      Head: Normocephalic and atraumatic.      Mouth/Throat:      Mouth: Mucous membranes are moist.   Eyes:      General: No scleral icterus.     Extraocular Movements: Extraocular movements intact.   Cardiovascular:      Rate and Rhythm: Normal rate and regular rhythm.   Pulmonary:      Effort: Pulmonary effort is normal.      Breath sounds: Normal breath sounds.   Abdominal:      Palpations: Abdomen is soft.      Tenderness: There is no abdominal tenderness.   Musculoskeletal:      Cervical back: Neck supple.   Skin:     General: Skin is warm and dry.      Capillary Refill: Capillary refill takes less than 2 seconds.   Neurological:      Mental Status: He is alert and oriented to person, place, and time.      GCS: GCS eye subscore is 4. GCS verbal subscore is 5. GCS motor subscore is 6.   Psychiatric:         Mood and Affect: Mood normal.         Behavior: Behavior normal.               ED Course     Labs Reviewed   COMP METABOLIC PANEL (14) - Abnormal; Notable for the following components:       Result Value    Glucose 133 (*)     BUN 46 (*)     Creatinine 2.27 (*)     Calculated Osmolality 306 (*)     eGFR-Cr  28 (*)     All other components within normal limits   CBC WITH DIFFERENTIAL WITH PLATELET - Abnormal; Notable for the following components:    WBC 19.1 (*)     HGB 10.0 (*)     HCT 31.0 (*)     MCV 63.5 (*)     MCH 20.5 (*)     Neutrophil Absolute Prelim 17.74 (*)     Neutrophil Absolute 17.74 (*)     Lymphocyte Absolute 0.35 (*)     All other components within normal limits   PRO BETA NATRIURETIC PEPTIDE - Abnormal; Notable for the following components:    Pro-Beta Natriuretic Peptide 7,878 (*)     All other components within normal limits   URINALYSIS, ROUTINE - Abnormal; Notable for the following components:    Clarity Urine Ex.Turbid (*)     Blood Urine 1+ (*)     Protein Urine 300 (*)     Urobilinogen Urine 3 (*)     Leukocyte Esterase Urine 500 (*)     WBC Urine >50 (*)     RBC Urine >10 (*)     Bacteria Urine 3+ (*)     Squamous Epi. Cells Few (*)     Hyaline Casts Present (*)     WBC Clump Present (*)     All other components within normal limits   LACTIC ACID, PLASMA - Abnormal; Notable for the following components:    Lactic Acid 2.1 (*)     All other components within normal limits   TROPONIN I HIGH SENSITIVITY - Normal   PROTHROMBIN TIME (PT) - Normal   PTT, ACTIVATED - Normal   SARS-COV-2/FLU A AND B/RSV BY PCR (GENEXPERT) - Normal    Narrative:     This test is intended for the qualitative detection and differentiation of SARS-CoV-2, influenza A, influenza B, and respiratory syncytial virus (RSV) viral RNA in nasopharyngeal or nares swabs from individuals suspected of respiratory viral infection consistent with COVID-19 by their healthcare provider. Signs and symptoms of respiratory viral infection due to SARS-CoV-2, influenza, and RSV can be similar.    Test performed using the Xpert Xpress SARS-CoV-2/FLU/RSV (real time RT-PCR)  assay on the [employer] instrument, [employer], [place] [address].   This test is being used under the Food and Drug Administration's Emergency Use Authorization.    The  authorized Fact Sheet for Healthcare Providers for this assay is available upon request from the laboratory.   PATH COMMENT CBC   LACTIC ACID REFLEX POST POSTIVE   BLOOD CULTURE   BLOOD CULTURE     EKG    Rate, intervals and axes as noted on EKG Report.  Rate: 75  Rhythm: Sinus Rhythm  Reading: Normal sinus rhythm, LAD, RBBB, LVH                 XR CHEST AP PORTABLE  (CPT=71045)    Result Date: 9/9/2024  CONCLUSION:  No acute disease.    LOCATION:  [facility]      Dictated by (CST): Mike Muñoz MD on 9/09/2024 at 4:26 PM     Finalized by (CST): Mike Muñoz MD on 9/09/2024 at 4:27 PM             A total of  50 minutes of critical care time (exclusive of billable procedures) was administered to manage the patient's critical lab values and cardiovascular instability due to his sepsis.  This involved direct patient intervention, complex decision making, and/or extensive discussions with the patient, family, and clinical staff.    MDM      84-year-old male with a past medical history as below including hypertension, hyperlipidemia, CAD, CHF, CKD presents with generalized weakness starting early this morning.      Differential includes but is not limited to sepsis, UTI, dehydration, electrolyte abnormality, anemia, viral respiratory infection, pneumonia      Labs show evidence of sepsis with WBC 19.1 and lactic acid 2.1.  Patient has chronic anemia with hemoglobin similar to previous.  Patient has mild ANITRA with creatinine of 2.27, increased from previous 1.77 on 7/23/2024.  Electrolytes are normal.    Independent interpretation of chest x-ray shows no evidence pneumonia or fluid overload.    Patient's blood pressure when placed back in exam room was mildly hypotensive at 94/48.  Improved with IV fluid bolus.  Patient becomes slightly hypoxic when sleeping likely due to underlying ELODIA.  O2 sat does improve when he is aroused.  Patient given 2 L normal saline, hold off on further fluids due to history of CHF.  Previous  urine culture showed Klebsiella that was resistant to Zosyn but susceptible to cefepime.  Patient given 2 g of cefepime.  Will admit for further management.  Discussed with hospitalist Dr. Issa.        Admission disposition: 9/9/2024  5:32 PM                                Medical Decision Making  Amount and/or Complexity of Data Reviewed  Independent Historian: spouse     Details: See HPI  External Data Reviewed: labs.     Details: See MDM  Labs: ordered. Decision-making details documented in ED Course.  Radiology: ordered and independent interpretation performed. Decision-making details documented in ED Course.  ECG/medicine tests: ordered and independent interpretation performed. Decision-making details documented in ED Course.  Discussion of management or test interpretation with external provider(s): Hospitalist    Risk  Decision regarding hospitalization.        Disposition and Plan     Clinical Impression:  1. Sepsis, due to unspecified organism, unspecified whether acute organ dysfunction present (HCC)    2. Urinary tract infection without hematuria, site unspecified         Disposition:  Admit  9/9/2024  5:32 pm    Follow-up:  No follow-up provider specified.        Medications Prescribed:  Current Discharge Medication List                            Hospital Problems       Present on Admission  Date Reviewed: 9/4/2024            ICD-10-CM Noted POA    * (Principal) Sepsis, due to unspecified organism, unspecified whether acute organ dysfunction present (HCC) A41.9 9/9/2024 Unknown                    aching

## 2024-09-09 NOTE — ED QUICK NOTES
Orders for admission, patient is aware of plan and ready to go upstairs. Any questions, please call ED RN Juan at extension [phone].     Patient Covid vaccination status: Fully vaccinated     COVID Test Ordered in ED: SARS-CoV-2/Flu A and B/RSV by PCR (GeneXpert)    COVID Suspicion at Admission: N/A    Running Infusions:      Mental Status/LOC at time of transport: Aox3    Other pertinent information:   CIWA score: N/A   NIH score:  N/A

## 2024-09-10 ENCOUNTER — APPOINTMENT (OUTPATIENT)
Dept: CT IMAGING | Facility: HOSPITAL | Age: 84
End: 2024-09-10
Payer: MEDICARE

## 2024-09-10 PROBLEM — N18.9 ACUTE KIDNEY INJURY SUPERIMPOSED ON CKD: Status: ACTIVE | Noted: 2024-09-10

## 2024-09-10 PROBLEM — K57.92 DIVERTICULITIS: Status: ACTIVE | Noted: 2024-09-10

## 2024-09-10 PROBLEM — N18.9 ACUTE KIDNEY INJURY SUPERIMPOSED ON CKD (HCC): Status: ACTIVE | Noted: 2024-09-10

## 2024-09-10 PROBLEM — N17.9 ACUTE KIDNEY INJURY SUPERIMPOSED ON CKD: Status: ACTIVE | Noted: 2024-09-10

## 2024-09-10 PROBLEM — N17.9 ACUTE KIDNEY INJURY SUPERIMPOSED ON CKD (HCC): Status: ACTIVE | Noted: 2024-09-10

## 2024-09-10 LAB
ALBUMIN SERPL-MCNC: 3.6 G/DL (ref 3.2–4.8)
ALBUMIN/GLOB SERPL: 1.6 {RATIO} (ref 1–2)
ALP LIVER SERPL-CCNC: 72 U/L
ALT SERPL-CCNC: 43 U/L
ANION GAP SERPL CALC-SCNC: 6 MMOL/L (ref 0–18)
AST SERPL-CCNC: 36 U/L (ref ?–34)
ATRIAL RATE: 75 BPM
BASOPHILS # BLD AUTO: 0.03 X10(3) UL (ref 0–0.2)
BASOPHILS NFR BLD AUTO: 0.3 %
BILIRUB SERPL-MCNC: 0.8 MG/DL (ref 0.2–1.1)
BUN BLD-MCNC: 51 MG/DL (ref 9–23)
CALCIUM BLD-MCNC: 8.6 MG/DL (ref 8.7–10.4)
CHLORIDE SERPL-SCNC: 109 MMOL/L (ref 98–112)
CO2 SERPL-SCNC: 27 MMOL/L (ref 21–32)
CREAT BLD-MCNC: 2.32 MG/DL
EGFRCR SERPLBLD CKD-EPI 2021: 27 ML/MIN/1.73M2 (ref 60–?)
EOSINOPHIL # BLD AUTO: 0.04 X10(3) UL (ref 0–0.7)
EOSINOPHIL NFR BLD AUTO: 0.4 %
ERYTHROCYTE [DISTWIDTH] IN BLOOD BY AUTOMATED COUNT: 15.6 %
GLOBULIN PLAS-MCNC: 2.3 G/DL (ref 2–3.5)
GLUCOSE BLD-MCNC: 97 MG/DL (ref 70–99)
HCT VFR BLD AUTO: 26.9 %
HGB BLD-MCNC: 8.6 G/DL
IMM GRANULOCYTES # BLD AUTO: 0.05 X10(3) UL (ref 0–1)
IMM GRANULOCYTES NFR BLD: 0.5 %
LYMPHOCYTES # BLD AUTO: 0.53 X10(3) UL (ref 1–4)
LYMPHOCYTES NFR BLD AUTO: 5.2 %
MAGNESIUM SERPL-MCNC: 1.8 MG/DL (ref 1.6–2.6)
MCH RBC QN AUTO: 20.5 PG (ref 26–34)
MCHC RBC AUTO-ENTMCNC: 32 G/DL (ref 31–37)
MCV RBC AUTO: 64.2 FL
MONOCYTES # BLD AUTO: 0.97 X10(3) UL (ref 0.1–1)
MONOCYTES NFR BLD AUTO: 9.6 %
NEUTROPHILS # BLD AUTO: 8.48 X10 (3) UL (ref 1.5–7.7)
NEUTROPHILS # BLD AUTO: 8.48 X10(3) UL (ref 1.5–7.7)
NEUTROPHILS NFR BLD AUTO: 84 %
OSMOLALITY SERPL CALC.SUM OF ELEC: 308 MOSM/KG (ref 275–295)
P AXIS: 28 DEGREES
P-R INTERVAL: 186 MS
PLATELET # BLD AUTO: 120 10(3)UL (ref 150–450)
PLATELETS.RETICULATED NFR BLD AUTO: 6.5 % (ref 0–7)
POTASSIUM SERPL-SCNC: 3.9 MMOL/L (ref 3.5–5.1)
PROT SERPL-MCNC: 5.9 G/DL (ref 5.7–8.2)
Q-T INTERVAL: 428 MS
QRS DURATION: 180 MS
QTC CALCULATION (BEZET): 477 MS
R AXIS: -72 DEGREES
RBC # BLD AUTO: 4.19 X10(6)UL
SODIUM SERPL-SCNC: 142 MMOL/L (ref 136–145)
T AXIS: 55 DEGREES
VENTRICULAR RATE: 75 BPM
WBC # BLD AUTO: 10.1 X10(3) UL (ref 4–11)

## 2024-09-10 PROCEDURE — 99221 1ST HOSP IP/OBS SF/LOW 40: CPT

## 2024-09-10 PROCEDURE — 74176 CT ABD & PELVIS W/O CONTRAST: CPT

## 2024-09-10 PROCEDURE — 99232 SBSQ HOSP IP/OBS MODERATE 35: CPT | Performed by: HOSPITALIST

## 2024-09-10 PROCEDURE — 99222 1ST HOSP IP/OBS MODERATE 55: CPT | Performed by: INTERNAL MEDICINE

## 2024-09-10 RX ORDER — FERROUS SULFATE 325(65) MG
325 TABLET, DELAYED RELEASE (ENTERIC COATED) ORAL DAILY
Status: DISCONTINUED | OUTPATIENT
Start: 2024-09-10 | End: 2024-09-11

## 2024-09-10 RX ORDER — MAGNESIUM OXIDE 400 MG/1
400 TABLET ORAL ONCE
Status: COMPLETED | OUTPATIENT
Start: 2024-09-10 | End: 2024-09-10

## 2024-09-10 RX ORDER — AMIODARONE HYDROCHLORIDE 200 MG/1
200 TABLET ORAL DAILY
Status: DISCONTINUED | OUTPATIENT
Start: 2024-09-10 | End: 2024-09-11

## 2024-09-10 RX ORDER — PANTOPRAZOLE SODIUM 40 MG/1
40 TABLET, DELAYED RELEASE ORAL
Status: DISCONTINUED | OUTPATIENT
Start: 2024-09-10 | End: 2024-09-11

## 2024-09-10 RX ORDER — CLOPIDOGREL BISULFATE 75 MG/1
75 TABLET ORAL DAILY
Status: DISCONTINUED | OUTPATIENT
Start: 2024-09-10 | End: 2024-09-11

## 2024-09-10 RX ORDER — ATORVASTATIN CALCIUM 10 MG/1
10 TABLET, FILM COATED ORAL NIGHTLY
Status: DISCONTINUED | OUTPATIENT
Start: 2024-09-10 | End: 2024-09-11

## 2024-09-10 RX ORDER — ASPIRIN 81 MG/1
81 TABLET, CHEWABLE ORAL DAILY
Status: DISCONTINUED | OUTPATIENT
Start: 2024-09-10 | End: 2024-09-11

## 2024-09-10 RX ORDER — METRONIDAZOLE 500 MG/100ML
500 INJECTION, SOLUTION INTRAVENOUS EVERY 12 HOURS
Status: DISCONTINUED | OUTPATIENT
Start: 2024-09-10 | End: 2024-09-11

## 2024-09-10 RX ORDER — CARVEDILOL 6.25 MG/1
6.25 TABLET ORAL 2 TIMES DAILY WITH MEALS
Status: DISCONTINUED | OUTPATIENT
Start: 2024-09-10 | End: 2024-09-11

## 2024-09-10 RX ORDER — FINASTERIDE 5 MG/1
5 TABLET, FILM COATED ORAL DAILY
Status: DISCONTINUED | OUTPATIENT
Start: 2024-09-10 | End: 2024-09-11

## 2024-09-10 NOTE — PHYSICAL THERAPY NOTE
PHYSICAL THERAPY EVALUATION - INPATIENT     Room Number: 423/423-A  Evaluation Date: 9/10/2024  Type of Evaluation: Initial  Physician Order: PT Eval and Treat    Presenting Problem: Sepsis, UTI  Co-Morbidities : CHF, NSTEMI, CKD, HTN, HL, BPH, recurrent UTI's  Reason for Therapy: Mobility Dysfunction and Discharge Planning    PHYSICAL THERAPY ASSESSMENT   Patient is a 84 year old male admitted 2024 for sepsis, uti.    Recent Hospitalizations:  -6/3 Flash pulmonary edema > home    Patient is currently functioning near baseline with transfers and gait. Prior to admission, patient's baseline is mod I for bed mobility, transfers and gait.     PLAN  Patient has been evaluated and presents with no skilled Physical Therapy needs at this time.  Patient discharged from Physical Therapy services.  Please re-order if a new functional limitation presents during this admission.  Recommend pt walk w/ staff 2-3x a day until d/c to maintain strength/balance/mobility.    GOALS  Patient was able to achieve the following goals ...    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  Shows up at his business for a couple of hours most days.  Pt prone to UTI's due to self catheterizing 3x a day.        SUBJECTIVE  \"I haven't really been out of bed for 24 hours.\"      OBJECTIVE  Precautions: Hard of hearing  Fall Risk: Standard fall risk    WEIGHT BEARING RESTRICTION  Weight Bearing Restriction: None                PAIN ASSESSMENT  Ratin           COGNITION  Overall Cognitive Status:  WFL - within functional limits  At times looking to wife to answer questions - unsure if it is simply due to [place] or mild confusion while being hospitalized.    RANGE OF MOTION AND STRENGTH ASSESSMENT  Upper extremity ROM and strength are within functional limits     Lower extremity ROM is within functional limits     Lower extremity strength is within functional limits - 4/5 throughout      BALANCE  Static Sitting: Good  Dynamic Sitting: Good  Static Standing: Fair  Dynamic Standing: Fair    ADDITIONAL TESTS                                    ACTIVITY TOLERANCE                         O2 WALK  Oxygen Therapy  SPO2% on Room Air at Rest: 100  SPO2% Ambulation on Room Air: 98    NEUROLOGICAL FINDINGS                        AM-PAC '6-Clicks' INPATIENT SHORT FORM - BASIC MOBILITY  How much difficulty does the patient currently have...  Patient Difficulty: Turning over in bed (including adjusting bedclothes, sheets and blankets)?: None   Patient Difficulty: Sitting down on and standing up from a chair with arms (e.g., wheelchair, bedside commode, etc.): None   Patient Difficulty: Moving from lying on back to sitting on the side of the bed?: None   How much help from another person does the patient currently need...   Help from Another: Moving to and from a bed to a chair (including a wheelchair)?: None   Help from Another: Need to walk in hospital room?: A Little   Help from Another: Climbing 3-5 steps with a railing?: A Little       AM-PAC Score:  Raw Score: 22   Approx Degree of Impairment: 20.91%   Standardized Score (AM-PAC Scale): 53.28   CMS Modifier (G-Code): CJ    FUNCTIONAL ABILITY STATUS  Gait Assessment   Functional Mobility/Gait Assessment  Gait Assistance: Supervision  Distance (ft): 175  Assistive Device: None  Pattern: Within Functional Limits (Slight flexion of trunk at hips w/ decreased arm swing)    Skilled Therapy Provided   Pt on 1L of O2 upon entering room.   Removed O2 while interviewing pt and sats remained stable and high.  Left O2 off for walking and sats were 98% immediately following gait outside of room.  Informed rn of findings.    Transfer Mobility:  Sit to stand: Mod I, pt using b ue's to support transition.  Performed 2x during session w/o difficulty.   Stand to sit: Mod I, cues for safety technique.  Had pt mimic stairs w/I room by having hand on elevated railing of bed and alternate tapping on iv pole.  Pt performed w/ ease, no lob and even was reaching le outside aneta that therapist intended him to do.  Performed at least 10 reps - has 4 steps w/ railing in his house.  Gait = Completed gait 175'x1 w/ supervision assist and w/o device.  Cues to relax ue's and try and initiate a natural arm swing.  Pt a little guarded w/ gait due to concern over his lines.        Exercise/Education Provided:  Functional activity tolerated  Gait training  Transfer training    Patient End of Session: Up in chair;Needs met;Call light within reach;RN aware of session/findings;All patient questions and concerns addressed;Family present (Ej Rolle aware of eval session)    Patient Evaluation Complexity Level:  History Moderate - 1 or 2 personal factors and/or co-morbidities   Examination of body systems Moderate - addressing a total of 3 or more elements   Clinical Presentation Low- Stable   Clinical Decision Making Low Complexity       PT Session Time: 25 minutes  Gait Trainin minutes  Therapeutic Activity: 5 minutes  Neuromuscular Re-education: 0 minutes  Therapeutic Exercise: 0 minutes

## 2024-09-10 NOTE — ED QUICK NOTES
Orders for admission, patient is aware of plan and ready to go upstairs. Any questions, please call ED RN Shalonda at extension [phone].     Patient Covid vaccination status: Fully vaccinated     COVID Test Ordered in ED: SARS-CoV-2/Flu A and B/RSV by PCR (GeneXpert)    COVID Suspicion at Admission: N/A    Running Infusions:  NS 0.9 999ml/hr, on second bag of 2000ml bolus. Once the current bag is done he is done.     Mental Status/LOC at time of transport: A&Ox3    Other pertinent information:   CIWA score: N/A   NIH score:  N/A

## 2024-09-10 NOTE — PLAN OF CARE
Pt A&Ox3, denies pain. Straight catheterization performed, 350 mL removed. Nephrology consult notified. Call light within reach.

## 2024-09-10 NOTE — CONSULTS
[facility]  Report of Consultation    Mik Valero Patient Status:  Inpatient    1940 MRN [id number]   Location [facility] 4NW-A Attending Louie Gutierres,    Hosp Day # 1 PCP Jaja Karimi MD     Reason for Consultation:  Acute kidney injury on CKD    History of Present Illness:  Mik Valero is a a 84 year old male with history of HTN, HLD, heart failure with preserved ejection fraction and CAD who presented with generalized weakness. Nephrology service is consulted for acute kidney injury on CKD.     He presented with one day of severe weakness while ambulating as well as emesis x1. UA obtained, concerning for infection and Ucx pending. He was given IVF and started on ABX. He had a straight cath with 650mL earlier this morning and urology recommended archer for decompression.     Patient with history of chronic kidney disease stage 3 in setting of long-standing hypertension, recurrent urinary retention and cardiorenal syndrome. He follows with Dr. Clancy and recently saw him on 24 which is when his Bumex was increased to 1mg QAM and 0.5mg QPM due to worsened edema and shortness of breath. Wife reports that they increased the dose on  and he had been feeling improved since.     History:  Past Medical History:    Congestive heart disease (HCC)    Coronary atherosclerosis    DVT, lower extremity (HCC)    Esophageal reflux    Hearing impairment    Bilateral hearing aids    High blood pressure    High cholesterol    Other and unspecified hyperlipidemia    Unspecified essential hypertension     Past Surgical History:   Procedure Laterality Date    Back surgery      Cataract      Prostate laser enucleation      Skin surgery  2019    Mohs/L superior him/SCC/done by MM    Stent pl single ves           Family History   Problem Relation Age of Onset    Heart Attack Father     Heart Attack Mother      Denies family history of kidney disease.    reports that he quit smoking  about 23 years ago. His smoking use included cigarettes. He has never used smokeless tobacco. He reports current alcohol use. He reports that he does not use drugs.    Allergies:  No Known Allergies    Medications:    Current Facility-Administered Medications:     amiodarone (Pacerone) tab 200 mg, 200 mg, Oral, Daily    aspirin chewable tab 81 mg, 81 mg, Oral, Daily    carvedilol (Coreg) tab 6.25 mg, 6.25 mg, Oral, BID with meals    clopidogrel (Plavix) tab 75 mg, 75 mg, Oral, Daily    ferrous sulfate DR tab 325 mg, 325 mg, Oral, Daily    finasteride (Proscar) tab 5 mg, 5 mg, Oral, Daily    pantoprazole (Protonix) DR tab 40 mg, 40 mg, Oral, QAM AC    atorvastatin (Lipitor) tab 10 mg, 10 mg, Oral, Nightly    metRONIDAZOLE in sodium chloride 0.79% (Flagyl) 5 mg/mL IVPB premix 500 mg, 500 mg, Intravenous, Q8H    ceFEPIme (Maxipime) 1 g in sodium chloride 0.9% 100 mL IVPB-MBP, 1 g, Intravenous, Q24H    acetaminophen (Tylenol Extra Strength) tab 500 mg, 500 mg, Oral, Q4H PRN    melatonin tab 3 mg, 3 mg, Oral, Nightly PRN    polyethylene glycol (PEG 3350) (Miralax) 17 g oral packet 17 g, 17 g, Oral, Daily PRN    sennosides (Senokot) tab 17.2 mg, 17.2 mg, Oral, Nightly PRN    bisacodyl (Dulcolax) 10 MG rectal suppository 10 mg, 10 mg, Rectal, Daily PRN    ondansetron (Zofran) 4 MG/2ML injection 4 mg, 4 mg, Intravenous, Q6H PRN    metoclopramide (Reglan) 5 mg/mL injection 5 mg, 5 mg, Intravenous, Q8H PRN    heparin (Porcine) 5000 UNIT/ML injection 5,000 Units, 5,000 Units, Subcutaneous, 2 times per day  No current outpatient medications on file.       Review of Systems:  Please see HPI for pertinent positives. 10 point review of systems otherwise reviewed and negative.     Physical Exam:  /44 (BP Location: Right arm)   Pulse 69   Temp 98.3 °F (36.8 °C) (Oral)   Resp 18   Ht 5' 7\" (1.702 m)   Wt 187 lb (84.8 kg)   SpO2 95%   BMI 29.29 kg/m²   Temp (24hrs), Av °F (37.2 °C), Min:98 °F (36.7 °C), Max:100.8 °F  (38.2 °C)       Intake/Output Summary (Last 24 hours) at 9/10/2024 1519  Last data filed at 9/10/2024 1330  Gross per 24 hour   Intake 2197 ml   Output 650 ml   Net 1547 ml     Wt Readings from Last 3 Encounters:   09/09/24 187 lb (84.8 kg)   09/04/24 187 lb (84.8 kg)   04/24/24 177 lb (80.3 kg)     General: awake, alert  HEENT: No scleral icterus, MMM  Neck: Supple, no TITI or thyromegaly  Cardiac: Regular rate and rhythm, S1, S2 normal,  Lungs: No increased work of breathing   Abdomen: Soft, non-tender.  Extremities: Without clubbing, cyanosis; No edema  Neurologic: Cranial nerves grossly intact, moving all extremities  Skin: Warm and dry, no rashes    Laboratory Data:  Lab Results   Component Value Date    WBC 10.1 09/10/2024    HGB 8.6 09/10/2024    HCT 26.9 09/10/2024    .0 09/10/2024    CREATSERUM 2.32 09/10/2024    BUN 51 09/10/2024     09/10/2024    K 3.9 09/10/2024     09/10/2024    CO2 27.0 09/10/2024    GLU 97 09/10/2024    CA 8.6 09/10/2024    ALB 3.6 09/10/2024    ALKPHO 72 09/10/2024    BILT 0.8 09/10/2024    TP 5.9 09/10/2024    AST 36 09/10/2024    ALT 43 09/10/2024    MG 1.8 09/10/2024       Imaging:  All imaging studies reviewed.      Assessment / Plan:    1) Acute kidney injury on CKD3: He has baseline CKD stage 3 in setting of long-standing hypertension, recurrent urinary retention and cardiorenal syndrome. His diuretics were recently increased due to worsened edema and shortness of breath. Current acute kidney injury is likely due to urinary tract infection and continued urinary retention. Continue IV ABX, waiting urine culture. Urology following, recommending archer placement though patient declining at this time, continue frequent bladder scans and straight caths. Hold IVF and diuretics for today.  2) Urinary tract infection: On Cefepime  3) HTN: Blood pressure low-normal. Continue Coreg. Holding Entresto  4) HFpEF: EF 50-55%, G1 DD as of 1/2024. Holding Bumex for today.   5)  Urinary retention: On finasteride. Continuing frequent bladder scans and straight caths.     Thank you for allowing me to participate in the care of your patient.    Anitra Sanchez MD  9/10/2024  3:19 PM

## 2024-09-10 NOTE — PLAN OF CARE
NURSING ADMISSION NOTE      Patient admitted via Cart  Oriented to room.  Safety precautions initiated.  Bed in low position.  Call light in reach.    Pt received A&Ox3, drowsy. [place]. RA, 2L NC at night. Tele. Tmax 100.8, tylenol given prn. Denies pain. Medications given per MAR. IVF. Admission navigator complete. Call light within reach. Fall precautions in place. Straight cath 650 ml out.

## 2024-09-10 NOTE — PLAN OF CARE
Approximately 0940: RN to bedside, patient and spouse at bedside. RN educated them on order for archer catheter per urology, spouse adamantly refused. RN attempted to educate on purpose of archer catheter, patient and spouse continued to refuse. Cris WALDEN PA-C notified of patient refusal, given spouses phone number to discuss plan of care. 1238: RN received phone call from spouse, requesting to ask patient about need for catheterization. RN clarified if they had discussed the archer catheter placement, spouse stated they continue to decline archer catheter placement and want to continue with intermittent catheterization. RN to room at approximately 1250 to bladder scan patient, patient declined at this time.

## 2024-09-10 NOTE — CONSULTS
[facility]   part of [facility]    Urology Consult Note    History of Present Illness:   Patient is a(n) 84 year old male with hx of CHF, CAD, DVT, GERD, HTN, HLD, NGB with recurrent UTI and BPH CIC 3x daily followed by Dr. Smith who presented yesterday for weakness and vomiting.     Feeling better today. Sleepy. RN staff has been straight cathing. This AM with 650mL out.     Cr: 2.32 (baseline: 1.6-1.9), WBC: 10.1, lactic acid: 2.1, UA with >50 WBC, >10 RBC, culture pending. On cefepime. Tmax 24h: 100.8.     Last saw Dr. Smith on 24 and was doing well. CIC 3x daily with 14fr.     Of note, KBUS on 23 with bilateral kidney cysts.     HISTORY:  Past Medical History:    Congestive heart disease (HCC)    Coronary atherosclerosis    DVT, lower extremity (HCC)    Esophageal reflux    Hearing impairment    Bilateral hearing aids    High blood pressure    High cholesterol    Other and unspecified hyperlipidemia    Unspecified essential hypertension      Past Surgical History:   Procedure Laterality Date    Back surgery      Cataract      Prostate laser enucleation      Skin surgery  2019    Mohs/L superior him/SCC/done by MM    Stent pl single ves            Family History   Problem Relation Age of Onset    Heart Attack Father     Heart Attack Mother       Social History:   Social History     Socioeconomic History    Marital status:    Tobacco Use    Smoking status: Former     Current packs/day: 0.00     Types: Cigarettes     Quit date: 2001     Years since quittin.7    Smokeless tobacco: Never   Vaping Use    Vaping status: Never Used   Substance and Sexual Activity    Alcohol use: Yes     Comment: once a week    Drug use: No   Other Topics Concern    Caffeine Concern Yes    Exercise No     Social Determinants of Health     Food Insecurity: No Food Insecurity (2024)    Food Insecurity     Food Insecurity: Never true   Transportation Needs: No Transportation Needs  (9/9/2024)    Transportation Needs     Lack of Transportation: No   Housing Stability: Low Risk  (9/9/2024)    Housing Stability     Housing Instability: No        Allergies  No Known Allergies    Review of Systems:   A 10-point review of systems was completed and is negative other than as noted above.    Physical Exam:   /44 (BP Location: Left arm)   Pulse 70   Temp 98.7 °F (37.1 °C) (Oral)   Resp 14   Ht 5' 7\" (1.702 m)   Wt 187 lb (84.8 kg)   SpO2 97%   BMI 29.29 kg/m²     GENERAL APPEARANCE: no acute distress  NEUROLOGIC: converses appropriately  HEAD: atraumatic, normocephalic  LUNGS: non-labored breathing  ABDOMEN: soft, nontender, non-distended  BACK: no CVA tenderness  PSYCH: appropriate affect and mood    Results:     Laboratory Data:  Lab Results   Component Value Date    WBC 10.1 09/10/2024    HGB 8.6 (L) 09/10/2024    .0 (L) 09/10/2024     Lab Results   Component Value Date     09/10/2024    K 3.9 09/10/2024     09/10/2024    CO2 27.0 09/10/2024    BUN 51 (H) 09/10/2024    GLU 97 09/10/2024    GFRAA 65 03/08/2022    AST 36 (H) 09/10/2024    ALT 43 09/10/2024    TP 5.9 09/10/2024    ALB 3.6 09/10/2024    PHOS 2.3 (L) 05/08/2021    CA 8.6 (L) 09/10/2024    MG 1.8 09/10/2024       Urinalysis Results (last 3 years):  Recent Labs     10/14/22  0853 06/30/23  0947 09/19/23  1514 12/13/23  1024 09/09/24  1602   COLORUR Yellow Yellow Yellow Light-Yellow Yellow   CLARITY Cloudy* Cloudy* Turbid* Clear Ex.Turbid*   SPECGRAVITY 1.013 1.015 1.012 1.011 1.024   PHURINE 5.0 5.0 5.5 5.0 5.5   PROUR 100* 100* 70* Negative 300*   GLUUR Negative Negative Normal Normal Normal   KETUR Negative Negative Negative Negative Negative   BILUR Negative Negative Negative Negative Negative   BLOODURINE Negative Negative Negative Negative 1+*   NITRITE Negative Negative Negative Negative Negative   UROBILINOGEN <2.0 <2.0 Normal Normal 3*   LEUUR Large* Large* 500* 75* 500*   WBCUR >50* >50* >50* 1-5  >50*   RBCUR 6-10* >10* 3-5* 0-2 >10*   BACUR 1+* 3+* 1+* Rare* 3+*       Urine Culture Results (last 3 years):  Lab Results   Component Value Date    URINECUL No Growth at 18-24 hrs. 12/13/2023    URINECUL No Growth 2 Days 10/03/2023    URINECUL (A) 09/19/2023     10,000 - 50,000 CFU/ML Klebsiella (Enterobacter) aerogenes    URINECUL (A) 09/19/2023     10,000 - 50,000 CFU/ML Enterococcus faecalis NOT VRE    URINECUL (A) 06/30/2023     >100,000 CFU/ML Klebsiella (Enterobacter) aerogenes    URINECUL >100,000 CFU/ML Escherichia coli (A) 06/30/2023    URINECUL >100,000 CFU/ML Escherichia coli (A) 10/14/2022    URINECUL (A) 08/02/2021     10,000 - 50,000 CFU/ML Alpha hemolytic Streptococcus    URINECUL No Growth 2 Days 05/18/2021    URINECUL >100,000 CFU/ML Klebsiella pneumoniae pneumoniae (A) 05/05/2021    URINECUL >100,000 CFU/ML Enterobacter cloacae (A) 04/12/2019       Imaging  XR CHEST AP PORTABLE  (CPT=71045)    Result Date: 9/9/2024  PROCEDURE:  XR CHEST AP PORTABLE  (CPT=71045)  TECHNIQUE:  AP chest radiograph was obtained.  COMPARISON:  [place] XR, XR CHEST PA + LAT CHEST (CPT=71046), 2/05/2024, 8:37 AM.  INDICATIONS:  AMS, lethargic, vomiting  PATIENT STATED HISTORY: (As transcribed by Technologist)  Patient offered no additional history at this time.    FINDINGS:  The heart is normal in size.  The lungs are clear.  There are no pleural effusions.  The bones demonstrate degenerative changes of the spine.            CONCLUSION:  No acute disease.    LOCATION:  [place]      Dictated by (CST): Mike Muñoz MD on 9/09/2024 at 4:26 PM     Finalized by (CST): Mike Muñoz MD on 9/09/2024 at 4:27 PM           Impression:   Recommendations:  UTI  - CT A/P to r/o upper tract etiology for infxn  - archer recommended for maximal decompression and pt was agreeable  - await cultures and tailor abx     ADDENDUM 1100: pt refusing archer. Discussed with wife on phone. Stressed importance of decompression in setting of  infxn. Would recommend archer for 7-10 days until infxn clears. She will discuss with pt.     ADDENDUM 1633: reviewed CT A/P bladder distension, otherwise neg kidney. Would recommend frequent PVR and CIC since continuing to refuse archer.     Thank you very much for this consult. Please call if there are any questions or concerns.     Cris Mac PA-C  Urology  [facility]  Phone: [phone]    Date: 9/10/2024  Time: 10:29 AM

## 2024-09-10 NOTE — PROGRESS NOTES
[facility]   part of [facility]     Hospitalist Progress Note     Mik Valero Patient Status:  Inpatient    1940 MRN [id number]   Location [facility] 4NW-A Attending Louie Gutierres,    Hosp Day # 1 PCP Jaja Karimi MD     Chief Complaint: Weakness    Subjective:     Patient seen and examined. Denies abdominal pain/N/V/D.     Objective:    Review of Systems:   A comprehensive review of systems was completed; pertinent positive and negatives stated in subjective.    Vital signs:  Temp:  [98 °F (36.7 °C)-100.8 °F (38.2 °C)] 98 °F (36.7 °C)  Pulse:  [69-89] 69  Resp:  [14-26] 18  BP: ()/(44-94) 115/44  SpO2:  [93 %-100 %] 95 %    Physical Exam:    General: No acute distress  Respiratory: No wheezes, no rhonchi  Cardiovascular: S1, S2, regular rate and rhythm  Abdomen: Soft, Non-tender, non-distended, positive bowel sounds  Neuro: No new focal deficits.   Extremities: No edema    Diagnostic Data:    Labs:  Recent Labs   Lab 24  1505 09/10/24  0712   WBC 19.1* 10.1   HGB 10.0* 8.6*   MCV 63.5* 64.2*   .0 120.0*   INR 1.04  --        Recent Labs   Lab 24  1504 09/10/24  0712   * 97   BUN 46* 51*   CREATSERUM 2.27* 2.32*   CA 9.5 8.6*   ALB 4.1 3.6    142   K 3.6 3.9    109   CO2 27.0 27.0   ALKPHO 78 72   AST 24 36*   ALT 30 43   BILT 0.9 0.8   TP 6.9 5.9       Estimated Creatinine Clearance: 23 mL/min (A) (based on SCr of 2.32 mg/dL (H)).    Recent Labs   Lab 24  1504   TROPHS 41       Recent Labs   Lab 24  1505   PTP 13.6   INR 1.04                  Microbiology    No results found for this visit on 24.      Imaging: Reviewed in Epic.    Medications:    amiodarone  200 mg Oral Daily    aspirin  81 mg Oral Daily    carvedilol  6.25 mg Oral BID with meals    clopidogrel  75 mg Oral Daily    ferrous sulfate  325 mg Oral Daily    finasteride  5 mg Oral Daily    pantoprazole  40 mg Oral QAM AC    atorvastatin  10 mg Oral Nightly     cefepime  1 g Intravenous Q24H    heparin  5,000 Units Subcutaneous 2 times per day       Assessment & Plan:      #Sepsis secondary to UTI and diverticulitis   -Management as below    #UTI  -IV cefepime (9/9-)  history of Klebsiella resistant to Cefazolin/ceftriaxone in September 2023  -Follow cultures     #Diverticulitis  -Continue Cefepime, add flagyl  -FLD for now     #ANITRA on CKD 3  -No significant change with IVF  -Will ask renal to see as diuretics recently increased      #CAD  #HLD  #HTN     #HFpEF, not in exacerbation  -Compensated   -BNP elevation may be inaccurate due to ANITRA/CKD  -Echo (January 2024) shows LVEF 50-55%, grade 1 diastolic dysfunction     #GERD     #Chronic microcytic anemia     #Recent hospitalization  -5/31-6/3/20/2024 at [facility] for acute flash pulmonary edema     #Amiodarone  -Unclear why patient taking this medication       Louie Gutierres, DO    Supplementary Documentation:     Quality:  DVT Mechanical Prophylaxis:   SCDs,    DVT Pharmacologic Prophylaxis   Medication    heparin (Porcine) 5000 UNIT/ML injection 5,000 Units      DVT Pharmacologic prophylaxis: Aspirin 162 mg         Code Status: Not on file  Cristobal: No urinary catheter in place  Cristobal Duration (in days):   Central line:    PAT:     Discharge is dependent on: clinical progress  At this point Mr. Valero is expected to be discharge to: home    The 21st Century Cures Act makes medical notes like these available to patients in the interest of transparency. Please be advised this is a medical document. Medical documents are intended to carry relevant information, facts as evident, and the clinical opinion of the practitioner. The medical note is intended as peer to peer communication and may appear blunt or direct. It is written in medical language and may contain abbreviations or verbiage that are unfamiliar.              **Certification      PHYSICIAN Certification of Need for Inpatient Hospitalization - Initial  Certification    Patient will require inpatient services that will reasonably be expected to span two midnight's based on the clinical documentation in H+P.   Based on patients current state of illness, I anticipate that, after discharge, patient will require TBD.

## 2024-09-10 NOTE — CDS QUERY
CLINICAL DOCUMENTATION CLARIFICATION FORM  Dear Doctor Nenita:  PLEASE (X) ALL DIAGNOSES THAT APPLY.    ( x )  Metabolic Encephalopathy  (  )  Toxic Metabolic Encephalopathy (please specify toxic substance):   (  )  Other (please specify)       Clinical Indicators:    9/9 ED provider note: Altered mental status  Lethargic/weakness  Sepsis  Dehydration  Shortness of breath        Risk Factors:    84 year old male  Urinary tract infection  diverticulitis    Treatments:    IV antibiotics  IV fluids            If you have any questions, please contact Clinical  : Rebekah Prince RN CDS  at [email or web address]    Thank You!    THIS FORM IS A PERMANENT PART OF THE MEDICAL RECORD

## 2024-09-11 VITALS
TEMPERATURE: 99 F | RESPIRATION RATE: 18 BRPM | BODY MASS INDEX: 29.35 KG/M2 | SYSTOLIC BLOOD PRESSURE: 122 MMHG | HEART RATE: 59 BPM | DIASTOLIC BLOOD PRESSURE: 52 MMHG | HEIGHT: 67 IN | WEIGHT: 187 LBS | OXYGEN SATURATION: 96 %

## 2024-09-11 PROBLEM — I13.10 CARDIORENAL SYNDROME: Status: ACTIVE | Noted: 2024-09-11

## 2024-09-11 PROBLEM — N18.30 STAGE 3 CHRONIC KIDNEY DISEASE (HCC): Status: ACTIVE | Noted: 2023-07-20

## 2024-09-11 LAB
ANION GAP SERPL CALC-SCNC: 10 MMOL/L (ref 0–18)
BUN BLD-MCNC: 50 MG/DL (ref 9–23)
CALCIUM BLD-MCNC: 8.9 MG/DL (ref 8.7–10.4)
CHLORIDE SERPL-SCNC: 108 MMOL/L (ref 98–112)
CO2 SERPL-SCNC: 24 MMOL/L (ref 21–32)
CREAT BLD-MCNC: 2.18 MG/DL
EGFRCR SERPLBLD CKD-EPI 2021: 29 ML/MIN/1.73M2 (ref 60–?)
GLUCOSE BLD-MCNC: 110 MG/DL (ref 70–99)
MAGNESIUM SERPL-MCNC: 1.9 MG/DL (ref 1.6–2.6)
OSMOLALITY SERPL CALC.SUM OF ELEC: 308 MOSM/KG (ref 275–295)
POTASSIUM SERPL-SCNC: 3.8 MMOL/L (ref 3.5–5.1)
SODIUM SERPL-SCNC: 142 MMOL/L (ref 136–145)

## 2024-09-11 PROCEDURE — 99233 SBSQ HOSP IP/OBS HIGH 50: CPT | Performed by: INTERNAL MEDICINE

## 2024-09-11 PROCEDURE — 99231 SBSQ HOSP IP/OBS SF/LOW 25: CPT | Performed by: PHYSICIAN ASSISTANT

## 2024-09-11 PROCEDURE — 99239 HOSP IP/OBS DSCHRG MGMT >30: CPT | Performed by: HOSPITALIST

## 2024-09-11 RX ORDER — CIPROFLOXACIN 500 MG/1
500 TABLET, FILM COATED ORAL 2 TIMES DAILY
Qty: 16 TABLET | Refills: 0 | Status: SHIPPED | OUTPATIENT
Start: 2024-09-11 | End: 2024-09-11

## 2024-09-11 RX ORDER — METRONIDAZOLE 500 MG/1
500 TABLET ORAL 3 TIMES DAILY
Qty: 24 TABLET | Refills: 0 | Status: SHIPPED | OUTPATIENT
Start: 2024-09-11 | End: 2024-09-19

## 2024-09-11 RX ORDER — CIPROFLOXACIN 500 MG/1
500 TABLET, FILM COATED ORAL DAILY
Qty: 8 TABLET | Refills: 0 | Status: SHIPPED | OUTPATIENT
Start: 2024-09-11 | End: 2024-09-19

## 2024-09-11 NOTE — PLAN OF CARE
Pt alert and oriented x3. No complaints of pain. VSS on RA. Afebrile. Pt declined bladder scan stating he didn't need a bladder scan a this time. Fall and safety precautions in place. Call light within reach.     0400: Pt stated he need to urinate and wanted to get up to the bathroom. Pt had large urine output. Pt states this is the first time in a long time he has been able to urinate without difficulty. Declined bladder scan at this time.     0620: Pt up to the bathroom with SBA and walker. Urine output 100ml.

## 2024-09-11 NOTE — PROGRESS NOTES
[facility]   part of [facility]    Progress Note    Mik Valero Patient Status:  Inpatient    1940 MRN [id number]   Location [facility] 4NW-A Attending Swati Marshall MD   Hosp Day # 2 PCP Jaja Karimi MD     Subjective:   Mik Valero is a(n) 84 year old male     Feeling well, declining bladder scans and catheterization.    Objective:   Blood pressure 133/47, pulse 72, temperature 98 °F (36.7 °C), temperature source Oral, resp. rate 18, height 5' 7\" (1.702 m), weight 187 lb (84.8 kg), SpO2 92%.    No distress  Non labored respirations    Results:   Lab Results   Component Value Date    WBC 10.1 09/10/2024    HGB 8.6 (L) 09/10/2024    HCT 26.9 (L) 09/10/2024    .0 (L) 09/10/2024    CREATSERUM 2.18 (H) 2024    BUN 50 (H) 2024     2024    K 3.8 2024     2024    CO2 24.0 2024     (H) 2024    CA 8.9 2024    ALB 3.6 09/10/2024    ALKPHO 72 09/10/2024    BILT 0.8 09/10/2024    TP 5.9 09/10/2024    AST 36 (H) 09/10/2024    ALT 43 09/10/2024    PTT 26.4 2024    INR 1.04 2024    TSH 1.980 2024     2021    PSA 0.70 2021    MG 1.9 2024    PHOS 2.3 (L) 2021    TROPHS 41 2024    B12 650 2018       CT ABDOMEN+PELVIS(CPT=74176)    Result Date: 9/10/2024  CONCLUSION:  1. There are findings consistent with acute diverticulitis of the sigmoid colon.  There is no free air or drainable abscess. 2. Mild infrarenal abdominal aortic aneurysm is noted measuring up to 3.1 cm. 3. Cysts and hyperdense cysts in both kidneys are not significantly changed and therefore considered benign.  No additional workup of this can be recommended at this time.    LOCATION:  [facility]   Dictated by (CST): Ajit Holman MD on 9/10/2024 at 1:05 PM     Finalized by (CST): Ajit Holman MD on 9/10/2024 at 1:08 PM       XR CHEST AP PORTABLE  (CPT=71045)    Result Date:  9/9/2024  CONCLUSION:  No acute disease.    LOCATION:  [facility]      Dictated by (CST): Mike Muñoz MD on 9/09/2024 at 4:26 PM     Finalized by (CST): Mike Muñoz MD on 9/09/2024 at 4:27 PM      EKG    Result Date: 9/10/2024  Normal sinus rhythm Right bundle branch block Minimal voltage criteria for LVH, may be normal variant ( R in aVL ) When compared with ECG of 06-OCT-2023 11:26, Premature ventricular complexes are no longer Present Confirmed by Fred Estrada ([id number]) on 9/10/2024 9:17:17 PM     Assessment & Plan:   UTI  Urinary retention    Patient feels doing well, voiding improved.   Preliminary urine culture GNR, on Cefepime.  Recommend continuation of antibiotic, await final cultures.  Outpatient follow up with urology.    We will sign off.     Shena Marcelino PA-C  [facility]  9/11/2024

## 2024-09-11 NOTE — PROGRESS NOTES
[facility]   part of [facility]     Hospitalist Progress Note     Mikrizwana Valero Patient Status:  Inpatient    1940 MRN [id number]   Location [facility] 4NW-A Attending Louie Gutierres,    Hosp Day # 2 PCP Jaja Karimi MD     Chief Complaint: Weakness    Subjective:     Eager to go home    Objective:    Review of Systems:   A comprehensive review of systems was completed; pertinent positive and negatives stated in subjective.    Vital signs:  Temp:  [97.6 °F (36.4 °C)-98.3 °F (36.8 °C)] 97.6 °F (36.4 °C)  Pulse:  [66-74] 71  Resp:  [18] 18  BP: (115-143)/(44-59) 143/59  SpO2:  [94 %-100 %] 94 %    Physical Exam:    General: No acute distress  Respiratory: No wheezes, no rhonchi  Cardiovascular: S1, S2, regular rate and rhythm  Abdomen: Soft, Non-tender, non-distended, positive bowel sounds  Neuro: No new focal deficits.   Extremities: No edema    Diagnostic Data:    Labs:  Recent Labs   Lab 24  1505 09/10/24  0712   WBC 19.1* 10.1   HGB 10.0* 8.6*   MCV 63.5* 64.2*   .0 120.0*   INR 1.04  --        Recent Labs   Lab 24  1504 09/10/24  0712   * 97   BUN 46* 51*   CREATSERUM 2.27* 2.32*   CA 9.5 8.6*   ALB 4.1 3.6    142   K 3.6 3.9    109   CO2 27.0 27.0   ALKPHO 78 72   AST 24 36*   ALT 30 43   BILT 0.9 0.8   TP 6.9 5.9       Estimated Creatinine Clearance: 23 mL/min (A) (based on SCr of 2.32 mg/dL (H)).    Recent Labs   Lab 24  1504   TROPHS 41       Recent Labs   Lab 24  1505   PTP 13.6   INR 1.04                  Microbiology    Hospital Encounter on 24   1. Blood Culture     Status: None (Preliminary result)    Collection Time: 24  4:02 PM    Specimen: Blood,peripheral   Result Value Ref Range    Blood Culture Result No Growth 1 Day N/A         Imaging: Reviewed in Epic.    Medications:    amiodarone  200 mg Oral Daily    aspirin  81 mg Oral Daily    carvedilol  6.25 mg Oral BID with meals    clopidogrel  75 mg Oral  Daily    ferrous sulfate  325 mg Oral Daily    finasteride  5 mg Oral Daily    pantoprazole  40 mg Oral QAM AC    atorvastatin  10 mg Oral Nightly    metRONIDAZOLE  500 mg Intravenous q12h    cefepime  1 g Intravenous Q24H    heparin  5,000 Units Subcutaneous 2 times per day       Assessment & Plan:      #Sepsis secondary to UTI and diverticulitis   -Management as below    #UTI  -IV cefepime (9/9-)  history of Klebsiella resistant to Cefazolin/ceftriaxone in September 2023  -Follow cultures     #Diverticulitis  -Continue Cefepime, add flagyl  -FLD for now     #ANITRA on CKD 3  -No significant change with IVF     #CAD  #HLD  #HTN     #HFpEF, not in exacerbation  -Compensated   -BNP elevation may be inaccurate due to ANITRA/CKD  -Echo (January 2024) shows LVEF 50-55%, grade 1 diastolic dysfunction     #GERD     #Chronic microcytic anemia     #Recent hospitalization  -5/31-6/3/20/2024 at [facility] for acute flash pulmonary edema     Dc home on oral Abx to cover for UTI and Diverticulitis      Swati Marshall M.D.  [place] Hospitalist      Supplementary Documentation:     Quality:  DVT Mechanical Prophylaxis:   SCDs,    DVT Pharmacologic Prophylaxis   Medication    heparin (Porcine) 5000 UNIT/ML injection 5,000 Units      DVT Pharmacologic prophylaxis: Aspirin 162 mg         Code Status: Not on file  Cristobal: No urinary catheter in place  Cristobal Duration (in days):   Central line:    PAT:     Discharge is dependent on: clinical progress  At this point Mr. Valero is expected to be discharge to: home    The 21st Century Cures Act makes medical notes like these available to patients in the interest of transparency. Please be advised this is a medical document. Medical documents are intended to carry relevant information, facts as evident, and the clinical opinion of the practitioner. The medical note is intended as peer to peer communication and may appear blunt or direct. It is written in medical language and may contain  abbreviations or verbiage that are unfamiliar.              **Certification      PHYSICIAN Certification of Need for Inpatient Hospitalization - Initial Certification    Patient will require inpatient services that will reasonably be expected to span two midnight's based on the clinical documentation in H+P.   Based on patients current state of illness, I anticipate that, after discharge, patient will require TBD.

## 2024-09-11 NOTE — PROGRESS NOTES
[facility]  Nephrology Progress Note    Mik Valero Attending:  Swati Marshall MD       Assessment and Plan:    1) CKD 3- baseline Cr < 2 due to longstanding HTN / nephrosclerosis + cardiorenal syndrome; previous eval for other etiologies unrevealing. PLAN- no further w/u; focus on HF mgmt    2) \"ANITRA\"- higher Cr due to increased diuretic dosing x 1 week; no other acute insults- acceptable    3) UTI + diverticulitis- on cefepime; urine cx pending; clinically improving    4) HFrEF s/p PCI / stent x 3  EF 35-40%- has been on coreg / entresto / loop diuretic as outpt    5) Longstanding HTN / hyperlipidemia    6) Self-catheterizes 3x/day x yrs    D/W family at bedside.      Subjective:  Awake alert sitting up wants to go home    Physical Exam:   /47 (BP Location: Right arm)   Pulse 72   Temp 98 °F (36.7 °C) (Oral)   Resp 18   Ht 5' 7\" (1.702 m)   Wt 187 lb (84.8 kg)   SpO2 92%   BMI 29.29 kg/m²   Temp (24hrs), Av.1 °F (36.7 °C), Min:97.6 °F (36.4 °C), Max:98.3 °F (36.8 °C)       Intake/Output Summary (Last 24 hours) at 2024 1012  Last data filed at 2024 0622  Gross per 24 hour   Intake 938 ml   Output 450 ml   Net 488 ml     Wt Readings from Last 3 Encounters:   24 187 lb (84.8 kg)   24 187 lb (84.8 kg)   24 177 lb (80.3 kg)     General: awake alert  HEENT: No scleral icterus, MMM  Neck: Supple, no TITI or thyromegaly  Cardiac: Regular rate and rhythm, S1, S2 normal, no murmur or tub  Lungs: Decreased BS at bases bilaterally   Abdomen: Soft, non-tender. + bowel sounds, no palpable organomegaly  Extremities: Without clubbing, cyanosis; no edema  Neurologic: Cranial nerves grossly intact, moving all extremities  Skin: Warm and dry, no rashes       Labs:   Lab Results   Component Value Date    CREATSERUM 2.18 2024    BUN 50 2024     2024    K 3.8 2024     2024    CO2 24.0 2024     2024    CA 8.9  09/11/2024    MG 1.9 09/11/2024       Imaging:  All imaging studies reviewed.    Meds:   Current Facility-Administered Medications   Medication Dose Route Frequency    amiodarone (Pacerone) tab 200 mg  200 mg Oral Daily    aspirin chewable tab 81 mg  81 mg Oral Daily    carvedilol (Coreg) tab 6.25 mg  6.25 mg Oral BID with meals    clopidogrel (Plavix) tab 75 mg  75 mg Oral Daily    ferrous sulfate DR tab 325 mg  325 mg Oral Daily    finasteride (Proscar) tab 5 mg  5 mg Oral Daily    pantoprazole (Protonix) DR tab 40 mg  40 mg Oral QAM AC    atorvastatin (Lipitor) tab 10 mg  10 mg Oral Nightly    metRONIDAZOLE in sodium chloride 0.79% (Flagyl) 5 mg/mL IVPB premix 500 mg  500 mg Intravenous q12h    ceFEPIme (Maxipime) 1 g in sodium chloride 0.9% 100 mL IVPB-MBP  1 g Intravenous Q24H    acetaminophen (Tylenol Extra Strength) tab 500 mg  500 mg Oral Q4H PRN    melatonin tab 3 mg  3 mg Oral Nightly PRN    polyethylene glycol (PEG 3350) (Miralax) 17 g oral packet 17 g  17 g Oral Daily PRN    sennosides (Senokot) tab 17.2 mg  17.2 mg Oral Nightly PRN    bisacodyl (Dulcolax) 10 MG rectal suppository 10 mg  10 mg Rectal Daily PRN    ondansetron (Zofran) 4 MG/2ML injection 4 mg  4 mg Intravenous Q6H PRN    metoclopramide (Reglan) 5 mg/mL injection 5 mg  5 mg Intravenous Q8H PRN    heparin (Porcine) 5000 UNIT/ML injection 5,000 Units  5,000 Units Subcutaneous 2 times per day         Questions/concerns were discussed with patient and/or family by bedside.          Jennifer Clancy MD  9/11/2024  10:12 AM

## 2024-09-11 NOTE — PROGRESS NOTES
Occupational Therapy    Orders received and chart review completed. Per EMR and conversation with RN, Pt seen for PT eval yesterday and cleared, able to complete all mobility safely and independently without device. RN confirmed no ADL concerns. Will DC at this time as there are no acute OT concerns. Please re-order if there is a change in status.

## 2024-09-11 NOTE — PLAN OF CARE
Problem: Patient/Family Goals  Goal: Patient/Family Long Term Goal  Description: Patient's Long Term Goal:     Interventions:  -   - See additional Care Plan goals for specific interventions  Outcome: Adequate for Discharge  Goal: Patient/Family Short Term Goal  Description: Patient's Short Term Goal:     Interventions:   -   - See additional Care Plan goals for specific interventions  Outcome: Adequate for Discharge     Patient alert and oriented, denies pain. Ambulates with supervision, up to chair. Patient and spouse states that patient has been voiding full amounts several times throughout the shift. Patient tolerates diet. Fall and safety precautions in place. Patient cleared for DC from all services. Discharge confirmation and AVS prepared by DC RN. AVS reviewed at length w patient and spouse, both persons state that all questions have been answered. Patient to  antibiotics X2 from own pharmacy. New medication education provided. Patient and spouse updated progressing and in agreement with POC and DC plan, pt eager for DC. Patient discharged in wheelchair accompanied by staff to private vehicle driven by spouse at approx 1655, all belongings with patient.

## 2024-09-12 ENCOUNTER — PATIENT OUTREACH (OUTPATIENT)
Dept: CASE MANAGEMENT | Age: 84
End: 2024-09-12

## 2024-09-12 DIAGNOSIS — A41.9 SEPSIS, DUE TO UNSPECIFIED ORGANISM, UNSPECIFIED WHETHER ACUTE ORGAN DYSFUNCTION PRESENT (HCC): Primary | ICD-10-CM

## 2024-09-12 DIAGNOSIS — Z02.9 ENCOUNTERS FOR ADMINISTRATIVE PURPOSE: ICD-10-CM

## 2024-09-12 PROCEDURE — 1111F DSCHRG MED/CURRENT MED MERGE: CPT

## 2024-09-12 NOTE — DISCHARGE SUMMARY
[place] HOSPITALIST  DISCHARGE SUMMARY     Mik Valero Patient Status:  Inpatient    1940 MRN [id number]   Location [facility] 4NW-A Attending No att. providers found   Hosp Day # 2 PCP Jaja Karimi MD     Date of Admission: 2024  Date of Discharge:  2024     Discharge Disposition: Home or Self Care    Discharge Diagnosis:  UTI  Acute diverticulitis  ANITRA on CKD stage III  CAD  Hyperlipidemia  Hypertension  GERD  HFpEF  Chronic microcytic anemia    History of Present Illness:   Mik Valero is a 84 year old male with PMHx CKD3, CAD, HFpEF, HTN, HLD, GERD presents hospital with chief complaints of generalized weakness and x 1 emesis.  Patient said symptoms started yesterday.  Patient was having severe weakness when ambulating.  He had some nausea and x 1 emesis that was nonbloody.  Patient otherwise denies any overt shortness of breath chest pain fevers chills at this time.     -S/p IV cefepime, fluid bolus in the emergency room    Brief Synopsis:   Patient was admitted with sepsis due to UTI and diverticulitis.  HE was treated with IV antibiotics and bowel rest.  His pain improved and his diet was advanced which he tolerated well. His renal function improved with IVF.  He was discharged in good condition    Lace+ Score: 72  59-90 High Risk  29-58 Medium Risk  0-28   Low Risk  Patient was referred to the [facility].    TCM Follow-Up Recommendation:  LACE 29-58: Moderate Risk of readmission after discharge from the hospital.      Procedures during hospitalization:   None    Consultants:  Renal      Discharge Medication List:     Discharge Medications        START taking these medications        Instructions Prescription details   ciprofloxacin 500 MG Tabs  Commonly known as: Cipro  Notes to patient: Take this separately from all vitamins      Take 1 tablet (500 mg total) by mouth daily for 8 days.   Stop taking on: 2024  Quantity: 8  tablet  Refills: 0     metRONIDAZOLE 500 MG Tabs  Commonly known as: Flagyl      Take 1 tablet (500 mg total) by mouth 3 (three) times daily for 8 days.   Stop taking on: September 19, 2024  Quantity: 24 tablet  Refills: 0            CONTINUE taking these medications        Instructions Prescription details   acetaminophen 500 MG Tabs  Commonly known as: Tylenol Extra Strength      Take 2 tablets (1,000 mg total) by mouth every 8 (eight) hours as needed.   Refills: 0     amiodarone 200 MG Tabs  Commonly known as: Pacerone      Take 1 tablet (200 mg total) by mouth daily.   Refills: 0     aspirin 81 MG Chew      Chew 1 tablet (81 mg total) by mouth daily.   Refills: 0     bumetanide 1 MG Tabs  Commonly known as: Bumex      Take 1.5 tablets (1.5 mg total) by mouth daily.   Refills: 0     carvedilol 6.25 MG Tabs  Commonly known as: Coreg      Take 1 tablet (6.25 mg total) by mouth 2 (two) times daily with meals.   Quantity: 60 tablet  Refills: 5     Centrum Silver Tabs      Take 1 tablet by mouth daily.   Refills: 0     cholecalciferol 50 MCG (2000 UT) Caps  Commonly known as: Vitamin D3      Take 50 mg by mouth daily.   Refills: 0     clopidogrel 75 MG Tabs  Commonly known as: Plavix      Take 1 tablet (75 mg total) by mouth daily.   Refills: 0     Entresto 24-26 MG Tabs  Generic drug: sacubitril-valsartan      Take 1 tablet by mouth 2 (two) times daily.   Quantity: 60 tablet  Refills: 5     finasteride 5 MG Tabs  Commonly known as: Proscar      Take 1 tablet (5 mg total) by mouth daily.   Quantity: 90 tablet  Refills: 3     gabapentin 300 MG Caps  Commonly known as: Neurontin      TAKE 1 CAPSULE BY MOUTH THREE TIMES DAILY   Quantity: 270 capsule  Refills: 0     Iron 325 (65 Fe) MG Tabs      Take 325 mg by mouth daily.   Refills: 0     Omeprazole 40 MG Cpdr      Take 1 capsule (40 mg total) by mouth 2 (two) times daily.   Quantity: 180 capsule  Refills: 3     simvastatin 40 MG Tabs  Commonly known as: Zocor      TAKE  1 TABLET(40 MG) BY MOUTH EVERY NIGHT   Quantity: 90 tablet  Refills: 1     vitamin C 1000 MG Tabs      Take 1 tablet (1,000 mg total) by mouth daily.   Refills: 0               Where to Get Your Medications        These medications were sent to [id number] - [place], [place] - [address] AT [facility] [address], [phone], [phone]  [address], [facility] [address]      Phone: [phone]   ciprofloxacin 500 MG Tabs  metRONIDAZOLE 500 MG Tabs         ILPM reviewed: n/a    Follow-up appointment:   Shena Kothari PA-C  [address]  [place] [address]  [phone]    Follow up      Jaja Karimi MD  [address]  [address]  [place] [address]  [phone]    Follow up in 1 week(s)  Follow up    Appointments for Next 30 Days 2024 - 10/12/2024      None            Vital signs:       Physical Exam:    General: No acute distress   Lungs: clear to auscultation  Cardiovascular: S1, S2  Abdomen: Soft      -----------------------------------------------------------------------------------------------  PATIENT DISCHARGE INSTRUCTIONS: See electronic chart    Swati Marshall MD    Total time spent on discharge plannin minutes     The  [place] Cures Act makes medical notes like these available to patients in the interest of transparency. Please be advised this is a medical document. Medical documents are intended to carry relevant information, facts as evident, and the clinical opinion of the practitioner. The medical note is intended as peer to peer communication and may appear blunt or direct. It is written in medical language and may contain abbreviations or verbiage that are unfamiliar.

## 2024-09-13 ENCOUNTER — TELEPHONE (OUTPATIENT)
Dept: INTERNAL MEDICINE CLINIC | Facility: CLINIC | Age: 84
End: 2024-09-13

## 2024-09-13 NOTE — TELEPHONE ENCOUNTER
Spoke to patient and his wife for TCM today.  Patient requesting an appt next week but none are available.  Per guidelines patient should  be seen within seven days by 9/18/24 as he is a high risk for readmission. No available appts. Please discuss with PCP and follow up with patient to assist in scheduling appt. Thank you     Otherwise, last date for TCM: 9/25/24

## 2024-09-13 NOTE — PROGRESS NOTES
Transitional Care Management   Discharge Date: 24  Contact Date: 2024    Assessment:  TCM Initial Assessment    General:  Assessment completed with: Patient;Spouse or significant other (Patient and wife Hailey were on speaker phone)  Patient Subjective: I feel okay, just a little out of breath but no pain no fever. Feeling better.  Chief Complaint: undefined Sepsis, due to unspecified organism, unspecified whether acute organ dysfunction present (HCC)  Verify patient name and  with patient/ caregiver: Yes    Hospital Stay/Discharge:  Tell me what you understand of why you were in the hospital or emergency department: weakness  Prior to leaving the hospital were your Discharge Instructions reviewed with you?: Yes  Did you receive a copy of your written Discharge Instructions?: Yes  What questions do you have about your Discharge Instructions?: none  Do you feel better or worse since you left the hospital or emergency department?: Better    Follow - Up Appointment:  Do you have a follow-up appointment?: No  Are there any barriers to getting to your follow-up appointment?: No    Home Health/DME:  Prior to leaving the hospital was Home Health (HH) arranged for you?: No     Prior to leaving the hospital or emergency department was Durable Medical Equipment (DME), medical supplies, or infusions arranged for you?: No  Are DME/medical supply/infusions needs identified by staff during this assessment?: No     Medications/Diet:  Did any of your medications change, during or after your hospital stay or ED visit?: Yes  Do you have your new or updated medications?: Yes  Do you understand what your medications are for and possible side effects?: Yes  Are there any reasons that keep you from taking your medication as prescribed?: No  Any concerns about medication refills?: No    Were you given a different diet per your Discharge Instructions?: No     Questions/Concerns:  Do you have any questions or concerns that have  not been discussed?: No       Nursing Interventions: NCM reviewed discharge instructions and when to seek medical attention with the patient and his wife as both were on speaker phone. Wife states that pt came back home 8 lbs heavier than his normal weight but assumes it's from the fluid received in the hospital. She states that his abdomen is a little swollen but that is normal for him. No swelling in legs. He did not weigh himself today yet but wife states that his weight has been going down everyday and he has one more day to go to get to his normal weight. He states that he has a little shortness of breath at times. He is actually going into work today and states that he feels fine. He states that he has been eating normal. His urine is getting lighter and it is not cloudy. He has not had a bowel movement yet but states that he is passing gas and denies having any nausea or vomiting. He will try miralax tomorrow if he does not have one by then. He does not check his bp. He denied having any fever, n/v, lightheadedness, HA or any other new or worsening symptoms. Med review completed. They denied having any other questions or concerns at this time.     Medication Review:   Current Outpatient Medications   Medication Sig Dispense Refill    metRONIDAZOLE 500 MG Oral Tab Take 1 tablet (500 mg total) by mouth 3 (three) times daily for 8 days. 24 tablet 0    ciprofloxacin 500 MG Oral Tab Take 1 tablet (500 mg total) by mouth daily for 8 days. 8 tablet 0    Omeprazole 40 MG Oral Capsule Delayed Release Take 1 capsule (40 mg total) by mouth 2 (two) times daily. 180 capsule 3    finasteride 5 MG Oral Tab Take 1 tablet (5 mg total) by mouth daily. 90 tablet 3    GABAPENTIN 300 MG Oral Cap TAKE 1 CAPSULE BY MOUTH THREE TIMES DAILY 270 capsule 0    simvastatin 40 MG Oral Tab TAKE 1 TABLET(40 MG) BY MOUTH EVERY NIGHT (Patient taking differently: Take 0.5 tablets (20 mg total) by mouth nightly.) 90 tablet 1    amiodarone 200  MG Oral Tab Take 1 tablet (200 mg total) by mouth daily.      bumetanide 1 MG Oral Tab Take 1.5 tablets (1.5 mg total) by mouth daily.      carvedilol 6.25 MG Oral Tab Take 1 tablet (6.25 mg total) by mouth 2 (two) times daily with meals. 60 tablet 5    sacubitril-valsartan (ENTRESTO) 24-26 MG Oral Tab Take 1 tablet by mouth 2 (two) times daily. 60 tablet 5    clopidogrel 75 MG Oral Tab Take 1 tablet (75 mg total) by mouth daily.      Ascorbic Acid (VITAMIN C) 1000 MG Oral Tab Take 1 tablet (1,000 mg total) by mouth daily.      acetaminophen 500 MG Oral Tab Take 2 tablets (1,000 mg total) by mouth every 8 (eight) hours as needed.      Vitamin D3 2000 units Oral Cap Take 50 mg by mouth daily.      Multiple Vitamins-Minerals (CENTRUM SILVER) Oral Tab Take 1 tablet by mouth daily.      aspirin 81 MG Oral Chew Tab Chew 1 tablet (81 mg total) by mouth daily.      Ferrous Sulfate (IRON) 325 (65 Fe) MG Oral Tab Take 325 mg by mouth daily.         Did patient review medications using current pill bottles and not just a medication list?  No  Discharge medications reviewed/discussed/and reconciled against outpatient medications with patient.  Any changes or updates to medications sent to primary care provider.  Patient Acknowledged    SDOH:   Transportation Needs: No Transportation Needs (9/9/2024)    Transportation Needs     Lack of Transportation: No     Car Seat: Not on file     Financial Resource Strain: Low Risk  (9/13/2024)    Financial Resource Strain     Difficulty of Paying Living Expenses: Not hard at all     Med Affordability: No       Follow-up Appointments:  Your appointments       Date & Time Appointment Department ([place])    Oct 24, 2024 9:20 AM CDT Follow up - Extended with Jaja Karimi MD [facility], [address] [place] [address] ([facility] [place])              [facility], [address] [place] [address]  [facility] [place]  [address]  [id number]  [phone]            Transitional Care Clinic  Was TCC Ordered: Yes  Was TCC Scheduled: No, Explain -pt prefers to see PCP      Primary Care Provider (If no TCC appointment)  Does patient already have a PCP appointment scheduled? No  Nurse Care Manager Attempted to schedule PCP office TCM appointment with patient   -If no appointment scheduled: Explain -no available appts, NCM sent TE to PCP office and pt aware he will receive a return call to schedule.     Specialist  Does the patient have any other follow-up appointment(s) that need to be scheduled? Yes   -If yes: Nurse Care Manager reviewed upcoming specialist appointments with patient: Yes   -Does the patient need assistance scheduling appointment(s): No, wife states that she will schedule with urology.    CCM referral placed:  No    Book By Date: 9/25/24

## 2024-09-13 NOTE — TELEPHONE ENCOUNTER
PSR's- please assist in scheduling pt for HFU appt, MD Trev currently has HFU opening x2 on 9/25.  Thank you.

## 2024-09-16 LAB
BACTERIA BLD CULT: POSITIVE
BLACTX-M ISLT/SPM QL: NOT DETECTED
E COLI DNA BLD POS QL NAA+NON-PROBE: DETECTED

## 2024-09-25 ENCOUNTER — OFFICE VISIT (OUTPATIENT)
Dept: INTERNAL MEDICINE CLINIC | Facility: CLINIC | Age: 84
End: 2024-09-25
Payer: MEDICARE

## 2024-09-25 VITALS
RESPIRATION RATE: 16 BRPM | SYSTOLIC BLOOD PRESSURE: 136 MMHG | OXYGEN SATURATION: 96 % | HEART RATE: 62 BPM | WEIGHT: 187 LBS | HEIGHT: 66.14 IN | BODY MASS INDEX: 30.05 KG/M2 | TEMPERATURE: 98 F | DIASTOLIC BLOOD PRESSURE: 64 MMHG

## 2024-09-25 DIAGNOSIS — N39.0 E. COLI UTI: ICD-10-CM

## 2024-09-25 DIAGNOSIS — B96.20 E. COLI UTI: ICD-10-CM

## 2024-09-25 DIAGNOSIS — I50.22 CHRONIC SYSTOLIC (CONGESTIVE) HEART FAILURE (HCC): ICD-10-CM

## 2024-09-25 DIAGNOSIS — D64.9 ACUTE ON CHRONIC ANEMIA: ICD-10-CM

## 2024-09-25 DIAGNOSIS — N18.32 STAGE 3B CHRONIC KIDNEY DISEASE (HCC): ICD-10-CM

## 2024-09-25 DIAGNOSIS — Z86.19 HISTORY OF SEPSIS: Primary | ICD-10-CM

## 2024-09-25 DIAGNOSIS — R33.9 URINARY RETENTION: ICD-10-CM

## 2024-09-25 DIAGNOSIS — K57.32 SIGMOID DIVERTICULITIS: ICD-10-CM

## 2024-09-25 LAB
APPEARANCE: CLEAR
BILIRUBIN: NEGATIVE
GLUCOSE (URINE DIPSTICK): NEGATIVE MG/DL
KETONES (URINE DIPSTICK): NEGATIVE MG/DL
LEUKOCYTES: NEGATIVE
MULTISTIX LOT#: ABNORMAL NUMERIC
NITRITE, URINE: NEGATIVE
OCCULT BLOOD: NEGATIVE
PH, URINE: 6 (ref 4.5–8)
PROTEIN (URINE DIPSTICK): 30 MG/DL
SPECIFIC GRAVITY: 1.01 (ref 1–1.03)
URINE-COLOR: YELLOW
UROBILINOGEN,SEMI-QN: 0.2 MG/DL (ref 0–1.9)

## 2024-09-25 PROCEDURE — 99495 TRANSJ CARE MGMT MOD F2F 14D: CPT | Performed by: INTERNAL MEDICINE

## 2024-09-25 PROCEDURE — 81003 URINALYSIS AUTO W/O SCOPE: CPT | Performed by: INTERNAL MEDICINE

## 2024-09-25 NOTE — PROGRESS NOTES
Subjective:   Mik Valero is a 84 year old male who presents for hospital follow up.   He was discharged from [facility] to Home or Self Care  Admission Date: 9/9/24   Discharge Date: 9/11/24  Hospital Discharge Diagnosis: sepsis from uti and acute diverticulitis, ANITRA on CKD3, CAD, HL, HTN, GERD, HFpEF, anemia    Interactive contact within 2 business days post discharge first initiated on Date: 9/12/2024    During the visit, the following was completed:  Obtained and reviewed discharge summary, continuity of care documents, and Hospitalist notes  Reviewed Labs (CBC, CMP), US radiology results, X-Ray radiology results, and EKG    HPI: Pleasant  man with CHF, CKD, chronic anemia, urinary retention (does self cath) admitted with weakness and vomiting. He was found to have sepsis with e coli uti and acute diverticulitis. He was treated with IVF, IV ABX and bowel rest. His urine culture showed E coli (was pansensitive). He felt better with IV antibiotics and was switched to po cipro and flagyl upon discharge. He finished the course at home and has no GI or  complaints today. He thinks last cscope was possibly 5 years ago. He does self cath and has no issues with dysuria or hematuria. Urine dip is normal today. His only complaints is weight gain since stopping metformin. His GFR is 29 , discussed I cannot restart metformin with such a low GFR and pt and his wife verbalized understanding.       History/Other:   Current Medications:  Medication Reconciliation:  I am aware of an inpatient discharge within the last 30 days.  The discharge medication list has been reconciled with the patient's current medication list and reviewed by me.  See medication list for additions of new medication, and changes to current doses of medications and discontinued medications.  Outpatient Medications Marked as Taking for the 9/25/24 encounter (Office Visit) with Jaja Karimi MD   Medication Sig    Omeprazole 40 MG Oral  Capsule Delayed Release Take 1 capsule (40 mg total) by mouth 2 (two) times daily.    finasteride 5 MG Oral Tab Take 1 tablet (5 mg total) by mouth daily.    GABAPENTIN 300 MG Oral Cap TAKE 1 CAPSULE BY MOUTH THREE TIMES DAILY    simvastatin 40 MG Oral Tab TAKE 1 TABLET(40 MG) BY MOUTH EVERY NIGHT (Patient taking differently: Take 0.5 tablets (20 mg total) by mouth nightly.)    amiodarone 200 MG Oral Tab Take 1 tablet (200 mg total) by mouth daily.    bumetanide 1 MG Oral Tab Take 1.5 tablets (1.5 mg total) by mouth daily.    carvedilol 6.25 MG Oral Tab Take 1 tablet (6.25 mg total) by mouth 2 (two) times daily with meals.    sacubitril-valsartan (ENTRESTO) 24-26 MG Oral Tab Take 1 tablet by mouth 2 (two) times daily.    clopidogrel 75 MG Oral Tab Take 1 tablet (75 mg total) by mouth daily.    Ascorbic Acid (VITAMIN C) 1000 MG Oral Tab Take 1 tablet (1,000 mg total) by mouth daily.    acetaminophen 500 MG Oral Tab Take 2 tablets (1,000 mg total) by mouth every 8 (eight) hours as needed.    Vitamin D3 2000 units Oral Cap Take 50 mg by mouth daily.    Multiple Vitamins-Minerals (CENTRUM SILVER) Oral Tab Take 1 tablet by mouth daily.    aspirin 81 MG Oral Chew Tab Chew 1 tablet (81 mg total) by mouth daily.    Ferrous Sulfate (IRON) 325 (65 Fe) MG Oral Tab Take 325 mg by mouth daily.         Review of Systems:  GENERAL: weight gain, +fatigue  SKIN: no acute rashes  EYES: denies blurred vision or double vision  HEENT: denies nasal congestion  LUNGS: chronic GUERIN  CARDIOVASCULAR: no CP  GI: denies abdominal pain, denies heartburn  MUSCULOSKELETAL: denies pain, normal range of motion of extremities  NEURO: denies headaches  PSYCHE: denies depression or anxiety  HEMATOLOGIC: +hx of anemia  ENDOCRINE: denies thyroid history  ALL/ASTHMA: denies hx of allergy or asthma    Objective:   No LMP for male patient.  Estimated body mass index is 30.05 kg/m² as calculated from the following:    Height as of this encounter: 5' 6.14\"  (1.68 m).    Weight as of this encounter: 187 lb (84.8 kg).   /64   Pulse 62   Temp 97.6 °F (36.4 °C) (Temporal)   Resp 16   Ht 5' 6.14\" (1.68 m)   Wt 187 lb (84.8 kg)   SpO2 96%   BMI 30.05 kg/m²    GENERAL: well developed, well nourished, in no apparent distress  SKIN: no rashes, no suspicious lesions  HEENT: atraumatic, normocephalic, throat clear  EYES: PERRLA, EOMI, conjunctiva are clear  NECK: supple, no adenopathy  CHEST: no chest tenderness  LUNGS: clear to auscultation  CARDIO: Reg, nl s1 s2  GI: good BS's, soft, NT/N  EXTREMITIES: 1+ edema  lower legs  NEURO: Oriented times three, no focal deficits    Assessment & Plan:   1. History of sepsis (Primary)- resolved, s/p IV ABX in the hospital and cipro and flagyl as outpatient  2. Sigmoid diverticulitis- resolved, referred to [facility] for follow up  -     Gastro Referral - In Network  3. E. coli UTI- resolved, no  complaints. Urine dip negative today  4. Urinary retention- resolved, he does self cath  5. Stage 3b chronic kidney disease (HCC)- stable, continue to monitor renal panel. Discussed metformin contraindicated, cannot restart for weight management.     6. Chronic systolic (congestive) heart failure (HCC)- EF has improved, he follows with Dr. Linton  7. Acute on chronic anemia- baseline hgb around 10, last one in hospital 8.6. will repeat labs prior to wellness in a month  -     CBC With Differential With Platelet; Future; Expected date: 10/11/2024  -     Iron And Tibc; Future; Expected date: 09/25/2024  -     Ferritin; Future; Expected date: 09/25/2024  -     Vitamin B12; Future; Expected date: 09/25/2024  -     Folic Acid Serum (Folate); Future; Expected date: 09/25/2024        Return in 29 days (on 10/24/2024), or if symptoms worsen or fail to improve, for wellness.

## 2024-10-21 ENCOUNTER — LAB ENCOUNTER (OUTPATIENT)
Dept: LAB | Age: 84
End: 2024-10-21
Attending: INTERNAL MEDICINE
Payer: MEDICARE

## 2024-10-21 DIAGNOSIS — R33.9 URINARY RETENTION: ICD-10-CM

## 2024-10-21 DIAGNOSIS — N39.0 E. COLI UTI: ICD-10-CM

## 2024-10-21 DIAGNOSIS — Z86.19 HISTORY OF SEPSIS: ICD-10-CM

## 2024-10-21 DIAGNOSIS — B96.20 E. COLI UTI: ICD-10-CM

## 2024-10-21 DIAGNOSIS — I50.22 CHRONIC SYSTOLIC (CONGESTIVE) HEART FAILURE (HCC): ICD-10-CM

## 2024-10-21 DIAGNOSIS — D64.9 ACUTE ON CHRONIC ANEMIA: ICD-10-CM

## 2024-10-21 DIAGNOSIS — N18.32 STAGE 3B CHRONIC KIDNEY DISEASE (HCC): ICD-10-CM

## 2024-10-21 LAB
ANION GAP SERPL CALC-SCNC: 11 MMOL/L (ref 0–18)
BASOPHILS # BLD AUTO: 0.05 X10(3) UL (ref 0–0.2)
BASOPHILS NFR BLD AUTO: 0.7 %
BUN BLD-MCNC: 53 MG/DL (ref 9–23)
CALCIUM BLD-MCNC: 9.7 MG/DL (ref 8.7–10.4)
CHLORIDE SERPL-SCNC: 106 MMOL/L (ref 98–112)
CO2 SERPL-SCNC: 24 MMOL/L (ref 21–32)
CREAT BLD-MCNC: 2.17 MG/DL
DEPRECATED HBV CORE AB SER IA-ACNC: 780 NG/ML
EGFRCR SERPLBLD CKD-EPI 2021: 30 ML/MIN/1.73M2 (ref 60–?)
EOSINOPHIL # BLD AUTO: 0.46 X10(3) UL (ref 0–0.7)
EOSINOPHIL NFR BLD AUTO: 6.3 %
ERYTHROCYTE [DISTWIDTH] IN BLOOD BY AUTOMATED COUNT: 15.8 %
FASTING STATUS PATIENT QL REPORTED: NO
FOLATE SERPL-MCNC: 32.6 NG/ML (ref 5.4–?)
GLUCOSE BLD-MCNC: 167 MG/DL (ref 70–99)
HCT VFR BLD AUTO: 30.8 %
HGB BLD-MCNC: 9.7 G/DL
IMM GRANULOCYTES # BLD AUTO: 0.01 X10(3) UL (ref 0–1)
IMM GRANULOCYTES NFR BLD: 0.1 %
IRON SATN MFR SERPL: 21 %
IRON SERPL-MCNC: 50 UG/DL
LYMPHOCYTES # BLD AUTO: 1.53 X10(3) UL (ref 1–4)
LYMPHOCYTES NFR BLD AUTO: 21.1 %
MCH RBC QN AUTO: 20.6 PG (ref 26–34)
MCHC RBC AUTO-ENTMCNC: 31.5 G/DL (ref 31–37)
MCV RBC AUTO: 65.5 FL
MONOCYTES # BLD AUTO: 0.6 X10(3) UL (ref 0.1–1)
MONOCYTES NFR BLD AUTO: 8.3 %
NEUTROPHILS # BLD AUTO: 4.61 X10 (3) UL (ref 1.5–7.7)
NEUTROPHILS # BLD AUTO: 4.61 X10(3) UL (ref 1.5–7.7)
NEUTROPHILS NFR BLD AUTO: 63.5 %
OSMOLALITY SERPL CALC.SUM OF ELEC: 310 MOSM/KG (ref 275–295)
PLATELET # BLD AUTO: 194 10(3)UL (ref 150–450)
PLATELETS.RETICULATED NFR BLD AUTO: 10 % (ref 0–7)
POTASSIUM SERPL-SCNC: 4 MMOL/L (ref 3.5–5.1)
RBC # BLD AUTO: 4.7 X10(6)UL
SODIUM SERPL-SCNC: 141 MMOL/L (ref 136–145)
TOTAL IRON BINDING CAPACITY: 243 UG/DL (ref 250–425)
TRANSFERRIN SERPL-MCNC: 174 MG/DL (ref 215–365)
VIT B12 SERPL-MCNC: 723 PG/ML (ref 211–911)
WBC # BLD AUTO: 7.3 X10(3) UL (ref 4–11)

## 2024-10-21 PROCEDURE — 36415 COLL VENOUS BLD VENIPUNCTURE: CPT

## 2024-10-21 PROCEDURE — 80048 BASIC METABOLIC PNL TOTAL CA: CPT

## 2024-10-21 PROCEDURE — 82746 ASSAY OF FOLIC ACID SERUM: CPT

## 2024-10-21 PROCEDURE — 85025 COMPLETE CBC W/AUTO DIFF WBC: CPT

## 2024-10-21 PROCEDURE — 83550 IRON BINDING TEST: CPT

## 2024-10-21 PROCEDURE — 82607 VITAMIN B-12: CPT

## 2024-10-21 PROCEDURE — 83540 ASSAY OF IRON: CPT

## 2024-10-21 PROCEDURE — 82728 ASSAY OF FERRITIN: CPT

## 2024-10-24 ENCOUNTER — OFFICE VISIT (OUTPATIENT)
Dept: INTERNAL MEDICINE CLINIC | Facility: CLINIC | Age: 84
End: 2024-10-24
Payer: MEDICARE

## 2024-10-24 VITALS
WEIGHT: 178 LBS | BODY MASS INDEX: 29 KG/M2 | TEMPERATURE: 97 F | DIASTOLIC BLOOD PRESSURE: 68 MMHG | HEART RATE: 60 BPM | OXYGEN SATURATION: 98 % | SYSTOLIC BLOOD PRESSURE: 122 MMHG

## 2024-10-24 DIAGNOSIS — D64.9 CHRONIC ANEMIA: ICD-10-CM

## 2024-10-24 DIAGNOSIS — I50.22 CHRONIC HFREF (HEART FAILURE WITH REDUCED EJECTION FRACTION) (HCC): ICD-10-CM

## 2024-10-24 DIAGNOSIS — Z00.00 ENCOUNTER FOR MEDICARE ANNUAL WELLNESS EXAM: Primary | ICD-10-CM

## 2024-10-24 DIAGNOSIS — M54.16 LUMBAR RADICULOPATHY: ICD-10-CM

## 2024-10-24 DIAGNOSIS — N13.8 BPH WITH URINARY OBSTRUCTION: ICD-10-CM

## 2024-10-24 DIAGNOSIS — R73.9 BLOOD GLUCOSE ELEVATED: ICD-10-CM

## 2024-10-24 DIAGNOSIS — E78.5 DYSLIPIDEMIA: ICD-10-CM

## 2024-10-24 DIAGNOSIS — N18.32 STAGE 3B CHRONIC KIDNEY DISEASE (HCC): ICD-10-CM

## 2024-10-24 DIAGNOSIS — I10 BENIGN ESSENTIAL HTN: ICD-10-CM

## 2024-10-24 DIAGNOSIS — I25.10 CORONARY ARTERY DISEASE INVOLVING NATIVE CORONARY ARTERY OF NATIVE HEART WITHOUT ANGINA PECTORIS: ICD-10-CM

## 2024-10-24 DIAGNOSIS — K21.9 GASTROESOPHAGEAL REFLUX DISEASE WITHOUT ESOPHAGITIS: ICD-10-CM

## 2024-10-24 DIAGNOSIS — N40.1 BPH WITH URINARY OBSTRUCTION: ICD-10-CM

## 2024-10-24 PROBLEM — A41.9 SEPSIS, DUE TO UNSPECIFIED ORGANISM, UNSPECIFIED WHETHER ACUTE ORGAN DYSFUNCTION PRESENT (HCC): Status: RESOLVED | Noted: 2024-09-09 | Resolved: 2024-10-24

## 2024-10-24 PROBLEM — I13.10 CARDIORENAL SYNDROME: Status: RESOLVED | Noted: 2024-09-11 | Resolved: 2024-10-24

## 2024-10-24 PROBLEM — J96.01 ACUTE RESPIRATORY FAILURE WITH HYPOXIA (HCC): Status: RESOLVED | Noted: 2023-08-01 | Resolved: 2024-10-24

## 2024-10-24 PROBLEM — N18.30 BENIGN HYPERTENSIVE HEART AND CKD, STAGE 3 (GFR 30-59), W CHF (HCC): Status: RESOLVED | Noted: 2023-08-01 | Resolved: 2024-10-24

## 2024-10-24 PROBLEM — N18.9 ACUTE KIDNEY INJURY SUPERIMPOSED ON CKD (HCC): Status: RESOLVED | Noted: 2024-09-10 | Resolved: 2024-10-24

## 2024-10-24 PROBLEM — K57.92 DIVERTICULITIS: Status: RESOLVED | Noted: 2024-09-10 | Resolved: 2024-10-24

## 2024-10-24 PROBLEM — R33.9 RETENTION OF URINE: Status: RESOLVED | Noted: 2023-08-02 | Resolved: 2024-10-24

## 2024-10-24 PROBLEM — R31.0 GROSS HEMATURIA: Status: RESOLVED | Noted: 2023-08-02 | Resolved: 2024-10-24

## 2024-10-24 PROBLEM — E87.20 LACTIC ACIDOSIS: Status: RESOLVED | Noted: 2023-08-01 | Resolved: 2024-10-24

## 2024-10-24 PROBLEM — R79.89 SERUM CREATININE RAISED: Status: RESOLVED | Noted: 2023-08-02 | Resolved: 2024-10-24

## 2024-10-24 PROBLEM — N39.0 URINARY TRACT INFECTION WITHOUT HEMATURIA, SITE UNSPECIFIED: Status: RESOLVED | Noted: 2024-09-09 | Resolved: 2024-10-24

## 2024-10-24 PROBLEM — M48.061 SPINAL STENOSIS OF LUMBAR REGION: Status: RESOLVED | Noted: 2023-07-20 | Resolved: 2024-10-24

## 2024-10-24 PROBLEM — I13.0 BENIGN HYPERTENSIVE HEART AND CKD, STAGE 3 (GFR 30-59), W CHF (HCC): Status: RESOLVED | Noted: 2023-08-01 | Resolved: 2024-10-24

## 2024-10-24 PROBLEM — I50.21 ACUTE SYSTOLIC HEART FAILURE (HCC): Status: RESOLVED | Noted: 2023-08-01 | Resolved: 2024-10-24

## 2024-10-24 PROBLEM — D72.829 LEUKOCYTOSIS: Status: RESOLVED | Noted: 2023-08-01 | Resolved: 2024-10-24

## 2024-10-24 PROBLEM — I21.4 NSTEMI (NON-ST ELEVATED MYOCARDIAL INFARCTION) (HCC): Status: RESOLVED | Noted: 2023-08-01 | Resolved: 2024-10-24

## 2024-10-24 PROBLEM — N18.9 ACUTE KIDNEY INJURY SUPERIMPOSED ON CKD: Status: RESOLVED | Noted: 2024-09-10 | Resolved: 2024-10-24

## 2024-10-24 PROBLEM — R00.1 BRADYCARDIA: Status: RESOLVED | Noted: 2023-03-07 | Resolved: 2024-10-24

## 2024-10-24 PROBLEM — N30.00 ACUTE CYSTITIS WITHOUT HEMATURIA: Status: RESOLVED | Noted: 2021-05-05 | Resolved: 2024-10-24

## 2024-10-24 PROBLEM — N17.9 ACUTE KIDNEY INJURY SUPERIMPOSED ON CKD: Status: RESOLVED | Noted: 2024-09-10 | Resolved: 2024-10-24

## 2024-10-24 PROBLEM — N17.9 ACUTE KIDNEY INJURY SUPERIMPOSED ON CKD (HCC): Status: RESOLVED | Noted: 2024-09-10 | Resolved: 2024-10-24

## 2024-10-24 NOTE — PROGRESS NOTES
Subjective:   Mik Valero is a 84 year old male who presents for a Medicare Subsequent Annual Wellness visit (Pt already had Initial Annual Wellness) and scheduled follow up of multiple significant but stable problems.   1. Encounter for Medicare annual wellness exam (Primary)- he declined all vaccines, he does not need screening colonoscopy at age 84. Encouraged low salt heart healthy diet and exercise as tolerated  2. Stage 3b chronic kidney disease (HCC)- stable, continue to monitor  -     Comp Metabolic Panel (14); Future; Expected date: 04/24/2025  3. Dyslipidemia- controlled on simvastatin, CPM  -     Comp Metabolic Panel (14); Future; Expected date: 04/24/2025  4. Chronic anemia- multifactorial (CKD, iron def), improved hgb at 9.7, will monitor levels  5. Blood glucose elevated, pre dm-  watch diet, monitor hgba1c  -     Hemoglobin A1C; Future; Expected date: 04/24/2025  6. Coronary artery disease involving native coronary artery of native heart without angina pectoris- stable, f/u cardiology as planned  7. Benign essential HTN- controlled, CPM  8. Chronic HFrEF (heart failure with reduced ejection fraction) (HCC)- stable, f/u cardiology  9. BPH with urinary obstruction- stable, he does self cath and follows with urology  10. Lumbar radiculopathy- doing well, no acute back or leg complaints. Continue gabapentin  11. Gastroesophageal reflux disease without esophagitis- controlled, CPM      History/Other:   Fall Risk Assessment:   He has been screened for Falls and is low risk.      Cognitive Assessment:   He had a completely normal cognitive assessment - see flowsheet entries     Functional Ability/Status:   Mik Valero has some abnormal functions as listed below:  He has Hearing problems based on screening of functional status.He has Vision problems based on screening of functional status.       Depression Screening (PHQ):  PHQ-2 SCORE: 0  , done 10/24/2024            Advanced Directives:    He does have a Living Will but we do NOT have it on file in Epic.    He does have a POA but we do NOT have it on file in Epic.    Not discussed      Patient Active Problem List   Diagnosis    Gastroesophageal reflux disease without esophagitis    IFG (impaired fasting glucose)    Benign essential HTN    Dyslipidemia    Lumbar radiculopathy    Acute cystitis without hematuria    Stage 3 chronic kidney disease (HCC)    Chronic anemia    Blood glucose elevated    Coronary artery disease involving native coronary artery of native heart without angina pectoris    Elevated coronary artery calcium score    BPH with urinary obstruction    Gross hematuria    Lactic acidosis    Leukocytosis    Retention of urine    Serum creatinine raised    Chronic HFrEF (heart failure with reduced ejection fraction) (HCC)    Urinary tract infection without hematuria, site unspecified    Diverticulitis    Cardiorenal syndrome     Allergies:  He has No Known Allergies.    Current Medications:  Outpatient Medications Marked as Taking for the 10/24/24 encounter (Office Visit) with Jaja Karimi MD   Medication Sig    Omeprazole 40 MG Oral Capsule Delayed Release Take 1 capsule (40 mg total) by mouth 2 (two) times daily.    finasteride 5 MG Oral Tab Take 1 tablet (5 mg total) by mouth daily.    GABAPENTIN 300 MG Oral Cap TAKE 1 CAPSULE BY MOUTH THREE TIMES DAILY    simvastatin 40 MG Oral Tab TAKE 1 TABLET(40 MG) BY MOUTH EVERY NIGHT (Patient taking differently: Take 0.5 tablets (20 mg total) by mouth nightly.)    amiodarone 200 MG Oral Tab Take 1 tablet (200 mg total) by mouth daily.    bumetanide 1 MG Oral Tab Take 1.5 tablets (1.5 mg total) by mouth daily.    carvedilol 6.25 MG Oral Tab Take 1 tablet (6.25 mg total) by mouth 2 (two) times daily with meals.    sacubitril-valsartan (ENTRESTO) 24-26 MG Oral Tab Take 1 tablet by mouth 2 (two) times daily.    clopidogrel 75 MG Oral Tab Take 1 tablet (75 mg total) by mouth daily.    Ascorbic  Acid (VITAMIN C) 1000 MG Oral Tab Take 1 tablet (1,000 mg total) by mouth daily.    acetaminophen 500 MG Oral Tab Take 2 tablets (1,000 mg total) by mouth every 8 (eight) hours as needed.    Vitamin D3 2000 units Oral Cap Take 50 mg by mouth daily.    Multiple Vitamins-Minerals (CENTRUM SILVER) Oral Tab Take 1 tablet by mouth daily.    aspirin 81 MG Oral Chew Tab Chew 1 tablet (81 mg total) by mouth daily.    Ferrous Sulfate (IRON) 325 (65 Fe) MG Oral Tab Take 325 mg by mouth daily.         Medical History:  He  has a past medical history of Congestive heart disease (HCC), Coronary atherosclerosis, DVT, lower extremity (HCC), Esophageal reflux, Hearing impairment, High blood pressure, High cholesterol, Other and unspecified hyperlipidemia, and Unspecified essential hypertension.  Surgical History:  He  has a past surgical history that includes prostate laser enucleation; cataract; back surgery; skin surgery (2019); and stent pl single ves.   Family History:  His family history includes Heart Attack in his father and mother.  Social History:  He  reports that he quit smoking about 23 years ago. His smoking use included cigarettes. He has never used smokeless tobacco. He reports current alcohol use. He reports that he does not use drugs.    Tobacco:  He smoked tobacco in the past but quit greater than 12 months ago.  Social History     Tobacco Use   Smoking Status Former    Current packs/day: 0.00    Types: Cigarettes    Quit date: 2001    Years since quittin.8   Smokeless Tobacco Never        CAGE Alcohol Screen:   CAGE screening score of 0 on 10/23/2024, showing low risk of alcohol abuse.      Patient Care Team:  Jaja Karimi MD as PCP - General (Internal Medicine)  Mily Issa MD (Internal Medicine)  Paul Dumont, JIN (Dietitian)  Shaji Jon, PT  Mark Rao, PT as Physical Therapist  Karuna Partida PT as Physical Therapist  Lucia Harrison, RN as [facility] TCM Care Manager (TCM  Management)    Review of Systems     Negative for f/c/CP/SOB    Objective:   Physical Exam  General Appearance:  Alert, cooperative, no distress, appears stated age   Head:  Normocephalic, without obvious abnormality, atraumatic   Eyes:  PERRL, conjunctiva/corneas clear, EOM's intact, both eyes   Ears:  Normal TM's and external ear canals, both ears   Nose: Nares normal, septum midline, mucosa normal, no drainage or sinus tenderness   Throat: Lips, mucosa, and tongue normal   Neck: Supple, symmetrical, trachea midline, no JVD   Back:   Symmetric, no curvature, ROM normal, no CVA tenderness   Lungs:   Clear to auscultation bilaterally, respirations unlabored   Chest Wall:  No tenderness or deformity   Heart:  Regular rate and rhythm, S1, S2 normal   Abdomen:   Soft, non-tender, bowel sounds active all four quadrants,  no masses, no organomegaly   Genitalia: deferred   Rectal: declined   Extremities: Trace LEedema   Pulses: 2+ and symmetric   Skin: No acute rashes       Neurologic: Alert and oriented     /68 (BP Location: Left arm, Patient Position: Sitting, Cuff Size: large)   Pulse 60   Temp 97 °F (36.1 °C) (Temporal)   Wt 178 lb (80.7 kg)   SpO2 98%   BMI 28.61 kg/m²  Estimated body mass index is 28.61 kg/m² as calculated from the following:    Height as of 9/25/24: 5' 6.14\" (1.68 m).    Weight as of this encounter: 178 lb (80.7 kg).    Medicare Hearing Assessment:   Hearing Screening    Time taken: 10/24/2024  9:11 AM  Entry User: Rosalina Cohn  Screening Method: Finger Rub  Finger Rub Result: Fail         Visual Acuity:   Right Eye Visual Acuity: Uncorrected Right Eye Chart Acuity: 20/50   Left Eye Visual Acuity: Uncorrected Left Eye Chart Acuity: 20/50   Both Eyes Visual Acuity: Uncorrected Both Eyes Chart Acuity: 20/30   Able To Tolerate Visual Acuity: Yes        Assessment & Plan:   Mik Valero is a 84 year old male who presents for a Medicare Assessment.     1. Encounter for Medicare annual  wellness exam (Primary)- he declined all vaccines, he does not need screening colonoscopy at age 84. Encouraged low salt heart healthy diet and exercise as tolerated  2. Stage 3b chronic kidney disease (HCC)- stable, continue to monitor  -     Comp Metabolic Panel (14); Future; Expected date: 04/24/2025  3. Dyslipidemia- controlled on simvastatin, CPM  -     Comp Metabolic Panel (14); Future; Expected date: 04/24/2025  4. Chronic anemia- multifactorial (CKD, iron def), improved hgb at 9.7, will monitor levels  5. Blood glucose elevated, pre dm-  watch diet, monitor hgba1c  -     Hemoglobin A1C; Future; Expected date: 04/24/2025  6. Coronary artery disease involving native coronary artery of native heart without angina pectoris- stable, f/u cardiology as planned  7. Benign essential HTN- controlled, CPM  8. Chronic HFrEF (heart failure with reduced ejection fraction) (HCC)- stable, f/u cardiology  9. BPH with urinary obstruction- stable, he does self cath and follows with urology  10. Lumbar radiculopathy- doing well, no acute back or leg complaints. Continue gabapentin  11. Gastroesophageal reflux disease without esophagitis- controlled, CPM      The patient indicates understanding of these issues and agrees to the plan.  Continue with current treatment plan.  Reinforced healthy diet, lifestyle, and exercise.      No follow-ups on file.     Jaja Karimi MD, 10/24/2024     Supplementary Documentation:   General Health:  In the past six months, have you lost more than 10 pounds without trying?: (Patient-Rptd) 2 - No  Has your appetite been poor?: (Patient-Rptd) No  Type of Diet: (Patient-Rptd) Low Salt  How does the patient maintain a good energy level?: (Patient-Rptd) Daily Walks  How would you describe your daily physical activity?: (Patient-Rptd) Light  How would you describe your current health state?: (Patient-Rptd) Good  How do you maintain positive mental well-being?: (Patient-Rptd) Social  Interaction;Puzzles;Visiting Friends;Visiting Family  On a scale of 0 to 10, with 0 being no pain and 10 being severe pain, what is your pain level?: (Patient-Rptd) 2 - (Mild)  In the past six months, have you experienced urine leakage?: (Patient-Rptd) 0-No  At any time do you feel concerned for the safety/well-being of yourself and/or your children, in your home or elsewhere?: (Patient-Rptd) No  Have you had any immunizations at another office such as Influenza, Hepatitis B, Tetanus, or Pneumococcal?: (Patient-Rptd) No    Health Maintenance   Topic Date Due    Pneumococcal Vaccine: 65+ Years (1 of 2 - PCV) Never done    Zoster Vaccines (1 of 2) Never done    COVID-19 Vaccine (5 - 2023-24 season) 09/01/2024    Influenza Vaccine (1) Never done    Annual Physical  10/13/2024    Annual Depression Screening  Completed    Fall Risk Screening (Annual)  Completed

## 2024-11-12 ENCOUNTER — TELEPHONE (OUTPATIENT)
Dept: SURGERY | Facility: CLINIC | Age: 84
End: 2024-11-12

## 2024-11-12 NOTE — TELEPHONE ENCOUNTER
-LMB ([phone]) on home/ mobile VM to RS 11/14/24 OV w/ SIRIB as physician is in Surgery all day.  -Outcome pending pt call-back.

## 2024-11-14 ENCOUNTER — PATIENT MESSAGE (OUTPATIENT)
Dept: NEPHROLOGY | Facility: CLINIC | Age: 84
End: 2024-11-14

## 2024-11-14 NOTE — TELEPHONE ENCOUNTER
Left a voicemail to let patient know appointment will be cancelled for today and he needs to call us back to reschedule.

## 2024-12-09 ENCOUNTER — PATIENT MESSAGE (OUTPATIENT)
Dept: INTERNAL MEDICINE CLINIC | Facility: CLINIC | Age: 84
End: 2024-12-09

## 2024-12-10 NOTE — TELEPHONE ENCOUNTER
Spouse is calling to indicate no sedation involved was told they were going use Novocain. She would like this appointment to be Virtual Visit since she stated the doctor is not going to do a full physical. The procedure is scheduled for the 12/19/24 at [facility]# [phone].

## 2024-12-11 NOTE — TELEPHONE ENCOUNTER
What kind of medication is she requesting prior to getting tooth pulled- something for anxiety?  How high was BP at the dentist? Probably need to see him for BP check unless he absolutely cannot come in and she is able to check BP at home.

## 2024-12-11 NOTE — TELEPHONE ENCOUNTER
Patient requesting medication prior to getting a tooth pulled with novacaine. Dental visit was rescheduled as first time BP was elevated and dentist would not proceed.    Patient requesting virtual visit to discuss medication. Does he need to be seen in person to check BP as well?

## 2024-12-11 NOTE — TELEPHONE ENCOUNTER
Spoke to wife, Hailey (ok per HIPAA). Appointment scheduled on Monday for BP check, discuss medication prior to dental procedure.

## 2024-12-11 NOTE — TELEPHONE ENCOUNTER
If the procedure was specifically stopped for BP then he really should be seen in office. Myself or my partners

## 2024-12-13 NOTE — PROGRESS NOTES
Mik Valero is a 84 year old male.    Chief Complaint   Patient presents with    Blood Pressure     Mb - rm 10 elevated BP        HPI:   here for bp check.  He was scheduled for tooth extraction and when he arrived his bp was elevated and the dentist would not proceed.  BP was 190 systolic.  He has never had a bp that high.  States they are using automatic cuff.    HTN /CHF/CAD  follows with [facility] and previously [facility].    ov 10/25/24.  EF 35-40%  entresto. Carvedilol and aldactone 12.5mg added at last cardiology visit.  (Considering SGLT2 but hx recurrent UTIs so holding off at this time.)  Plavix and ASA.    BP today 138/80  manual cuff.    he has taken valium in the past prior to MRI and did well.  Will order for prior to dental procedure.        CKD 4  Dr Clancy.      [facility] [address]      Patient Active Problem List   Diagnosis    Gastroesophageal reflux disease without esophagitis    IFG (impaired fasting glucose)    Benign essential HTN    Dyslipidemia    Lumbar radiculopathy    Stage 3 chronic kidney disease (HCC)    Chronic anemia    Blood glucose elevated    Coronary artery disease involving native coronary artery of native heart without angina pectoris    Elevated coronary artery calcium score    BPH with urinary obstruction    Chronic HFrEF (heart failure with reduced ejection fraction) ([facility])     Current Outpatient Medications   Medication Sig Dispense Refill    diazePAM (VALIUM) 2 MG Oral Tab 1 tab 30-60min prior to procedure.  May repeat x 1.  No driving. 2 tablet 0    Omeprazole 40 MG Oral Capsule Delayed Release Take 1 capsule (40 mg total) by mouth 2 (two) times daily. 180 capsule 3    finasteride 5 MG Oral Tab Take 1 tablet (5 mg total) by mouth daily. 90 tablet 3    GABAPENTIN 300 MG Oral Cap TAKE 1 CAPSULE BY MOUTH THREE TIMES DAILY 270 capsule 0    simvastatin 40 MG Oral Tab TAKE 1 TABLET(40 MG) BY MOUTH EVERY NIGHT (Patient taking differently: Take 0.5 tablets (20 mg  total) by mouth nightly.) 90 tablet 1    amiodarone 200 MG Oral Tab Take 1 tablet (200 mg total) by mouth daily.      bumetanide 1 MG Oral Tab Take 1.5 tablets (1.5 mg total) by mouth daily.      carvedilol 6.25 MG Oral Tab Take 1 tablet (6.25 mg total) by mouth 2 (two) times daily with meals. 60 tablet 5    sacubitril-valsartan (ENTRESTO) 24-26 MG Oral Tab Take 1 tablet by mouth 2 (two) times daily. 60 tablet 5    clopidogrel 75 MG Oral Tab Take 1 tablet (75 mg total) by mouth daily.      Ascorbic Acid (VITAMIN C) 1000 MG Oral Tab Take 1 tablet (1,000 mg total) by mouth daily.      acetaminophen 500 MG Oral Tab Take 2 tablets (1,000 mg total) by mouth every 8 (eight) hours as needed.      Vitamin D3 2000 units Oral Cap Take 50 mg by mouth daily.      Multiple Vitamins-Minerals (CENTRUM SILVER) Oral Tab Take 1 tablet by mouth daily.      aspirin 81 MG Oral Chew Tab Chew 1 tablet (81 mg total) by mouth daily.      Ferrous Sulfate (IRON) 325 (65 Fe) MG Oral Tab Take 325 mg by mouth daily.          Past Medical History:    Congestive heart disease (HCC)    Coronary atherosclerosis    DVT, lower extremity (HCC)    Esophageal reflux    Hearing impairment    Bilateral hearing aids    High blood pressure    High cholesterol    Other and unspecified hyperlipidemia    Unspecified essential hypertension      Social History:  Social History     Socioeconomic History    Marital status:    Tobacco Use    Smoking status: Former     Current packs/day: 0.00     Types: Cigarettes     Quit date: 2001     Years since quittin.9    Smokeless tobacco: Never   Vaping Use    Vaping status: Never Used   Substance and Sexual Activity    Alcohol use: Yes     Comment: once a week    Drug use: No   Other Topics Concern    Caffeine Concern Yes    Exercise No     Social Drivers of Health     Financial Resource Strain: Low Risk  (2024)    Financial Resource Strain     Difficulty of Paying Living Expenses: Not hard at all      Med Affordability: No   Food Insecurity: No Food Insecurity (10/2/2024)    Received from [facility]    Food Insecurity     Currently or in the past 3 months, have you worried your food would run out before you had money to buy more?: No     In the past 12 months, have you run out of food or been unable to get more?: No   Transportation Needs: No Transportation Needs (10/2/2024)    Received from [facility]    Transportation Needs     Currently or in the past 3 months, has lack of transportation kept you from medical appointments, getting food or medicine, or providing care to a family member?: Unrecognized value     Medical Transportation Needs?: No   Housing Stability: Low Risk  (9/9/2024)    Housing Stability     Housing Instability: No     Family History   Problem Relation Age of Onset    Heart Attack Father     Heart Attack Mother         Allergies  Allergies[1]    REVIEW OF SYSTEMS:   GENERAL HEALTH: feels well otherwise  RESPIRATORY: denies shortness of breath with exertion, no cough  CARDIOVASCULAR: denies chest pain on exertion, no palpatations  GI: denies abdominal pain and denies heartburn, no diarrhea or constipation  MUSCULOSKELETAL:  No arthralgias or myalgias  NEURO: denies headaches,     EXAM:   /84   Pulse 67   Temp 97.4 °F (36.3 °C) (Temporal)   Resp 16   Ht 5' 6.14\" (1.68 m)   Wt 177 lb (80.3 kg)   SpO2 99%   BMI 28.45 kg/m²   GENERAL: well developed, well nourished,in no apparent distress  LUNGS: normal rate without respiratory distress, lungs clear to auscultation  CARDIO: RRR  GI: normal bowel sounds, no masses, HSM or tenderness  EXTREMITIES: no edema, normal strength and tone  PSYCH: alert and oriented x 3    ASSESSMENT AND PLAN:     Encounter Diagnoses   Name Primary?    Benign essential HTN  stable today   continue same meds.   Yes    Situational anxiety  valium 2mg to repeat x 1 prior to procedure.  No driving.       Coronary artery  disease involving native coronary artery of native heart without angina pectoris  has transitioned to [facility].       CKD (chronic kidney disease) stage 4, GFR 15-29 ml/min ([facility])  doing well  Dr Clancy.         No orders of the defined types were placed in this encounter.      Meds & Refills for this Visit:  Requested Prescriptions     Signed Prescriptions Disp Refills    diazePAM (VALIUM) 2 MG Oral Tab 2 tablet 0     Si tab 30-60min prior to procedure.  May repeat x 1.  No driving.       Imaging & Consults:  None    No follow-ups on file.  There are no Patient Instructions on file for this visit.         [1] No Known Allergies

## 2024-12-16 ENCOUNTER — OFFICE VISIT (OUTPATIENT)
Dept: INTERNAL MEDICINE CLINIC | Facility: CLINIC | Age: 84
End: 2024-12-16
Payer: MEDICARE

## 2024-12-16 VITALS
RESPIRATION RATE: 16 BRPM | BODY MASS INDEX: 28.45 KG/M2 | TEMPERATURE: 97 F | SYSTOLIC BLOOD PRESSURE: 138 MMHG | DIASTOLIC BLOOD PRESSURE: 80 MMHG | HEIGHT: 66.14 IN | OXYGEN SATURATION: 99 % | WEIGHT: 177 LBS | HEART RATE: 67 BPM

## 2024-12-16 DIAGNOSIS — I25.10 CORONARY ARTERY DISEASE INVOLVING NATIVE CORONARY ARTERY OF NATIVE HEART WITHOUT ANGINA PECTORIS: ICD-10-CM

## 2024-12-16 DIAGNOSIS — I10 BENIGN ESSENTIAL HTN: Primary | ICD-10-CM

## 2024-12-16 DIAGNOSIS — N18.4 CKD (CHRONIC KIDNEY DISEASE) STAGE 4, GFR 15-29 ML/MIN (HCC): ICD-10-CM

## 2024-12-16 DIAGNOSIS — R54 FRAIL ELDERLY: ICD-10-CM

## 2024-12-16 DIAGNOSIS — F41.8 SITUATIONAL ANXIETY: ICD-10-CM

## 2024-12-16 PROCEDURE — 99214 OFFICE O/P EST MOD 30 MIN: CPT | Performed by: NURSE PRACTITIONER

## 2024-12-16 PROCEDURE — G2211 COMPLEX E/M VISIT ADD ON: HCPCS | Performed by: NURSE PRACTITIONER

## 2024-12-16 RX ORDER — DIAZEPAM 2 MG/1
TABLET ORAL
Qty: 2 TABLET | Refills: 0 | Status: SHIPPED | OUTPATIENT
Start: 2024-12-16

## 2024-12-27 RX ORDER — GABAPENTIN 300 MG/1
300 CAPSULE ORAL 3 TIMES DAILY
Qty: 270 CAPSULE | Refills: 1 | Status: SHIPPED | OUTPATIENT
Start: 2024-12-27

## 2025-02-27 ENCOUNTER — OFFICE VISIT (OUTPATIENT)
Dept: NEPHROLOGY | Facility: CLINIC | Age: 85
End: 2025-02-27
Payer: MEDICARE

## 2025-02-27 VITALS — SYSTOLIC BLOOD PRESSURE: 120 MMHG | BODY MASS INDEX: 29 KG/M2 | WEIGHT: 178 LBS | DIASTOLIC BLOOD PRESSURE: 62 MMHG

## 2025-02-27 DIAGNOSIS — N18.4 CKD (CHRONIC KIDNEY DISEASE) STAGE 4, GFR 15-29 ML/MIN (HCC): Primary | ICD-10-CM

## 2025-02-27 DIAGNOSIS — I50.20 HFREF (HEART FAILURE WITH REDUCED EJECTION FRACTION) (HCC): ICD-10-CM

## 2025-02-27 DIAGNOSIS — I13.0 CARDIORENAL SYNDROME WITH RENAL FAILURE, STAGE 1-4 OR UNSPECIFIED CHRONIC KIDNEY DISEASE, WITH HEART FAILURE (HCC): ICD-10-CM

## 2025-02-27 PROCEDURE — 99214 OFFICE O/P EST MOD 30 MIN: CPT | Performed by: INTERNAL MEDICINE

## 2025-02-27 RX ORDER — SPIRONOLACTONE 25 MG/1
12.5 TABLET ORAL DAILY
COMMUNITY

## 2025-02-27 NOTE — PROGRESS NOTES
Nephrology Progress Note      ASSESSMENT/PLAN:      1) CKD 3/4- intrinsic SCr < 2 gm/dl due to HTN / age-related nephrosclerosis; higher Cr 2.7-3.0 mg/dl due to cardiorenal syndrome and higher dose bumex / entresto (titrated up after hosp for CHF 10/24).  Workup for other causes of CKD was unrevealing.  Meds are otherwise benign without chronic analgesic or PPI use.  Ultrasound shows only simple cyst without obstruction or mass. PLAN- no further w/u; focus on HF mgmt. Accept modest azotemia with diuretics / entresto. To f/u in 6 months.     2) HFrEF- s/p stent x 3 2023; EF 35-40% with recent hosp for CHF exacerbation- now on bumex 1.5 mg alternating with 1 mg daily; entresto 49/51 bid + aldactone 12.5 mg daily as per cards (Dr. Livingston)    3) Longstanding HTN / hyperlipidemia     4) CAD as above     5) Self-catheterizes 3x/day x yrs    6) Thalassemia (MCV 67)- baseline hgb 10 g/dl; exac by CKD       HPI:   Mik Valero is a 84 year old male who presents for follow-up of   Chief Complaint   Patient presents with    Chronic Kidney Disease    Hypertension       Very pleasant 84-year-old male presents for evaluation of chronic kidney disease and CHF    HISTORY:  Past Medical History:    Congestive heart disease (HCC)    Coronary atherosclerosis    DVT, lower extremity (HCC)    Esophageal reflux    Hearing impairment    Bilateral hearing aids    High blood pressure    High cholesterol    Other and unspecified hyperlipidemia    Unspecified essential hypertension      Past Surgical History:   Procedure Laterality Date    Back surgery      Cataract      Prostate laser enucleation      Skin surgery  02/27/2019    Mohs/L superior him/SCC/done by MM    Stent pl single ves      2022      Family History   Problem Relation Age of Onset    Heart Attack Father     Heart Attack Mother       Social History:   Social History     Socioeconomic History    Marital status:    Tobacco Use    Smoking status: Former      Current packs/day: 0.00     Types: Cigarettes     Quit date: 2001     Years since quittin.1    Smokeless tobacco: Never   Vaping Use    Vaping status: Never Used   Substance and Sexual Activity    Alcohol use: Yes     Comment: once a week    Drug use: No   Other Topics Concern    Caffeine Concern Yes    Exercise No     Social Drivers of Health     Food Insecurity: No Food Insecurity (10/2/2024)    Received from [facility]    Food Insecurity     Currently or in the past 3 months, have you worried your food would run out before you had money to buy more?: No     In the past 12 months, have you run out of food or been unable to get more?: No   Transportation Needs: No Transportation Needs (10/2/2024)    Received from [facility]    Transportation Needs     Currently or in the past 3 months, has lack of transportation kept you from medical appointments, getting food or medicine, or providing care to a family member?: Unrecognized value     Medical Transportation Needs?: No   Housing Stability: Low Risk  (2024)    Housing Stability     Housing Instability: No        Medications (Active prior to today's visit):  Current Outpatient Medications   Medication Sig Dispense Refill    spironolactone 25 MG Oral Tab Take 0.5 tablets (12.5 mg total) by mouth daily.      gabapentin 300 MG Oral Cap Take 1 capsule (300 mg total) by mouth 3 (three) times daily. 270 capsule 1    ALPRAZolam (XANAX) 0.25 MG Oral Tab 1 tab 60min prior to procedure.  Repeat x 1 just prior to dental procedure. 2 tablet 0    diazePAM (VALIUM) 2 MG Oral Tab 1 tab 30-60min prior to procedure.  May repeat x 1.  No driving. 2 tablet 0    Omeprazole 40 MG Oral Capsule Delayed Release Take 1 capsule (40 mg total) by mouth 2 (two) times daily. 180 capsule 3    finasteride 5 MG Oral Tab Take 1 tablet (5 mg total) by mouth daily. 90 tablet 3    simvastatin 40 MG Oral Tab TAKE 1 TABLET(40 MG) BY MOUTH EVERY NIGHT  (Patient taking differently: Take 0.5 tablets (20 mg total) by mouth nightly.) 90 tablet 1    amiodarone 200 MG Oral Tab Take 1 tablet (200 mg total) by mouth daily.      bumetanide 1 MG Oral Tab Take 1.5 tablets (1.5 mg total) by mouth daily.      carvedilol 6.25 MG Oral Tab Take 1 tablet (6.25 mg total) by mouth 2 (two) times daily with meals. 60 tablet 5    sacubitril-valsartan (ENTRESTO) 24-26 MG Oral Tab Take 1 tablet by mouth 2 (two) times daily. 60 tablet 5    clopidogrel 75 MG Oral Tab Take 1 tablet (75 mg total) by mouth daily.      Ascorbic Acid (VITAMIN C) 1000 MG Oral Tab Take 1 tablet (1,000 mg total) by mouth daily.      acetaminophen 500 MG Oral Tab Take 2 tablets (1,000 mg total) by mouth every 8 (eight) hours as needed.      Vitamin D3 2000 units Oral Cap Take 50 mg by mouth daily.      Multiple Vitamins-Minerals (CENTRUM SILVER) Oral Tab Take 1 tablet by mouth daily.      aspirin 81 MG Oral Chew Tab Chew 1 tablet (81 mg total) by mouth daily.      Ferrous Sulfate (IRON) 325 (65 Fe) MG Oral Tab Take 325 mg by mouth daily.           Allergies:  No Known Allergies    ROS:     Denies fever/chills  Denies wt loss/gain  Denies HA or visual changes  Denies CP or palpitations  Denies SOB/cough/hemoptysis  Denies abd or flank pain  Denies N/V/D  Denies change in urinary habits or gross hematuria  Denies LE edema  Denies skin rashes/myalgias/arthralgias      PHYSICAL EXAM:   /62 (BP Location: Left arm, Patient Position: Sitting)   Wt 178 lb (80.7 kg)   BMI 28.61 kg/m²   Wt Readings from Last 3 Encounters:   02/27/25 178 lb (80.7 kg)   12/16/24 177 lb (80.3 kg)   10/24/24 178 lb (80.7 kg)     General: Alert and oriented in no apparent distress.  HEENT: No scleral icterus, MMM  Neck: Supple, no TITI or thyromegaly  Cardiac: Regular rate and rhythm, S1, S2 normal, no murmur or rub  Lungs: Clear without wheezes, rales, rhonchi.    Abdomen: Soft, non-tender. + bowel sounds, no palpable  organomegaly  Extremities: Without clubbing, cyanosis or edema.  Neurologic: Alert and oriented, normal affect, cranial nerves grossly intact, moving all extremities  Skin: Warm and dry, no rashes      Jennifer Clancy MD  2/27/2025  10:55 AM

## 2025-04-29 ENCOUNTER — HOSPITAL ENCOUNTER (OUTPATIENT)
Age: 85
Discharge: LEFT BEFORE DISCHARGE | End: 2025-04-29
Payer: MEDICARE

## 2025-04-29 ENCOUNTER — HOSPITAL ENCOUNTER (OUTPATIENT)
Age: 85
Discharge: HOME | End: 2025-04-29
Payer: MEDICARE

## 2025-04-29 VITALS
DIASTOLIC BLOOD PRESSURE: 54 MMHG | RESPIRATION RATE: 18 BRPM | SYSTOLIC BLOOD PRESSURE: 142 MMHG | OXYGEN SATURATION: 100 % | TEMPERATURE: 98 F | HEART RATE: 58 BPM

## 2025-04-29 VITALS
TEMPERATURE: 98 F | DIASTOLIC BLOOD PRESSURE: 42 MMHG | HEART RATE: 57 BPM | SYSTOLIC BLOOD PRESSURE: 121 MMHG | RESPIRATION RATE: 18 BRPM | OXYGEN SATURATION: 96 %

## 2025-04-29 DIAGNOSIS — N17.9 ACUTE RENAL FAILURE, UNSPECIFIED ACUTE RENAL FAILURE TYPE: Primary | ICD-10-CM

## 2025-04-29 DIAGNOSIS — R53.83 OTHER FATIGUE: ICD-10-CM

## 2025-04-29 DIAGNOSIS — E87.5 HYPERKALEMIA: ICD-10-CM

## 2025-04-29 LAB
BASOPHILS # BLD AUTO: 0.05 X10(3) UL (ref 0–0.2)
BASOPHILS NFR BLD AUTO: 0.5 %
BILIRUB UR QL STRIP: NEGATIVE
BUN BLD-MCNC: >120 MG/DL (ref 7–18)
CHLORIDE BLD-SCNC: 108 MMOL/L (ref 98–112)
CLARITY UR: CLEAR
CO2 BLD-SCNC: 23 MMOL/L (ref 21–32)
COLOR UR: YELLOW
CREAT BLD-MCNC: 4.5 MG/DL (ref 0.7–1.3)
EGFRCR SERPLBLD CKD-EPI 2021: 12 ML/MIN/1.73M2 (ref 60–?)
EOSINOPHIL # BLD AUTO: 0.43 X10(3) UL (ref 0–0.7)
EOSINOPHIL NFR BLD AUTO: 4.3 %
ERYTHROCYTE [DISTWIDTH] IN BLOOD BY AUTOMATED COUNT: 16.7 %
GLUCOSE BLD-MCNC: 116 MG/DL (ref 70–99)
GLUCOSE UR STRIP-MCNC: NEGATIVE MG/DL
HCT VFR BLD AUTO: 28.2 % (ref 39–53)
HCT VFR BLD CALC: 28 % (ref 37–53)
HGB BLD-MCNC: 8.9 G/DL (ref 13–17.5)
HGB UR QL STRIP: NEGATIVE
IMM GRANULOCYTES # BLD AUTO: 0.03 X10(3) UL (ref 0–1)
IMM GRANULOCYTES NFR BLD: 0.3 %
ISTAT IONIZED CALCIUM FOR CHEM 8: 1.23 MMOL/L (ref 1.12–1.32)
KETONES UR STRIP-MCNC: NEGATIVE MG/DL
LYMPHOCYTES # BLD AUTO: 2.12 X10(3) UL (ref 1–4)
LYMPHOCYTES NFR BLD AUTO: 21.1 %
MCH RBC QN AUTO: 20.3 PG (ref 26–34)
MCHC RBC AUTO-ENTMCNC: 31.6 G/DL (ref 31–37)
MCV RBC AUTO: 64.4 FL (ref 80–100)
MONOCYTES # BLD AUTO: 0.97 X10(3) UL (ref 0.1–1)
MONOCYTES NFR BLD AUTO: 9.7 %
NEUTROPHILS # BLD AUTO: 6.44 X10 (3) UL (ref 1.5–7.7)
NEUTROPHILS # BLD AUTO: 6.44 X10(3) UL (ref 1.5–7.7)
NEUTROPHILS NFR BLD AUTO: 64.1 %
NITRITE UR QL STRIP: NEGATIVE
PH UR STRIP: 5.5 [PH]
PLATELET # BLD AUTO: 245 10(3)UL (ref 150–450)
PLATELETS.RETICULATED NFR BLD AUTO: 8.8 % (ref 0–7)
POTASSIUM BLD-SCNC: 5.4 MMOL/L (ref 3.6–5.1)
PROT UR STRIP-MCNC: NEGATIVE MG/DL
RBC # BLD AUTO: 4.38 X10(6)UL (ref 3.8–5.8)
SODIUM BLD-SCNC: 143 MMOL/L (ref 136–145)
SP GR UR STRIP: 1.01
UROBILINOGEN UR STRIP-ACNC: <2 MG/DL
WBC # BLD AUTO: 10 X10(3) UL (ref 4–11)

## 2025-04-29 PROCEDURE — 85025 COMPLETE CBC W/AUTO DIFF WBC: CPT | Performed by: NURSE PRACTITIONER

## 2025-04-29 PROCEDURE — 81002 URINALYSIS NONAUTO W/O SCOPE: CPT | Performed by: NURSE PRACTITIONER

## 2025-04-29 PROCEDURE — 80047 BASIC METABLC PNL IONIZED CA: CPT | Performed by: NURSE PRACTITIONER

## 2025-04-29 PROCEDURE — 99214 OFFICE O/P EST MOD 30 MIN: CPT | Performed by: NURSE PRACTITIONER

## 2025-04-29 NOTE — ED INITIAL ASSESSMENT (HPI)
Patient here with concerns for UTI, he states that he is generally asymptomatic with UTIs, other than fatigue which he has been experiencing lately, intermittent back pain, patient self catheterizes multiple times a day due to chronic retention , denies fever/hematuria

## 2025-04-29 NOTE — ED PROVIDER NOTES
Patient Seen in: [facility]      History     Chief Complaint   Patient presents with    Fatigue     Stated Complaint: urinary symptoms    Subjective:   This is an 84-year-old male with below stated medical history.  Presents to immediate care for feeling tired and fatigued.  Symptoms ongoing for the last few days.  Concern for UTI.  He does self cath multiple times a day due to urinary retention.  States he is normally asymptomatic when he has urinary tract infections.  He denies any coughing, difficulty breathing, or wheezing.  He denies any chest pain or shortness of breath.  No headaches or dizziness.  No fevers.  Reports he has been having some intermittent low back pain but this is not abnormal for him.  Cardiology increased his Bumex 1 to 2 months ago.  No treatment attempted prior to arrival.     The history is provided by the patient.         History of Present Illness               Objective:     Past Medical History:    Congestive heart disease (HCC)    Coronary atherosclerosis    DVT, lower extremity (HCC)    Esophageal reflux    Hearing impairment    Bilateral hearing aids    High blood pressure    High cholesterol    Other and unspecified hyperlipidemia    Unspecified essential hypertension              Past Surgical History:   Procedure Laterality Date    Back surgery      Cataract      Prostate laser enucleation      Skin surgery  2019    Mohs/L superior him/SCC/done by MM    Stent pl single ves                      Social History     Socioeconomic History    Marital status:    Tobacco Use    Smoking status: Former     Current packs/day: 0.00     Types: Cigarettes     Quit date: 2001     Years since quittin.3    Smokeless tobacco: Never   Vaping Use    Vaping status: Never Used   Substance and Sexual Activity    Alcohol use: Yes     Comment: once a week    Drug use: No   Other Topics Concern    Caffeine Concern Yes    Exercise No     Social Drivers of Health      Food Insecurity: No Food Insecurity (10/2/2024)    Received from [facility]    Food Insecurity     Currently or in the past 3 months, have you worried your food would run out before you had money to buy more?: No     In the past 12 months, have you run out of food or been unable to get more?: No   Transportation Needs: No Transportation Needs (10/2/2024)    Received from [facility]    Transportation Needs     Currently or in the past 3 months, has lack of transportation kept you from medical appointments, getting food or medicine, or providing care to a family member?: No     Medical Transportation Needs?: No   Housing Stability: Low Risk  (9/9/2024)    Housing Stability     Housing Instability: No              Review of Systems   Constitutional:  Positive for fatigue. Negative for chills and fever.   HENT:  Negative for congestion and sore throat.    Respiratory:  Negative for cough, shortness of breath and wheezing.    Cardiovascular:  Negative for chest pain, palpitations and leg swelling.   Gastrointestinal:  Negative for abdominal pain, constipation, diarrhea, nausea and vomiting.   Genitourinary:  Negative for dysuria, flank pain, hematuria, penile discharge, penile pain, penile swelling, scrotal swelling, testicular pain and urgency.   Musculoskeletal:  Negative for back pain, neck pain and neck stiffness.   Skin:  Negative for rash.   Neurological:  Negative for seizures, syncope, weakness and headaches.       Positive for stated complaint: urinary symptoms  Other systems are as noted in HPI.  Constitutional and vital signs reviewed.      All other systems reviewed and negative except as noted above.                  Physical Exam     ED Triage Vitals [04/29/25 1049]   /54   Pulse 58   Resp 18   Temp 97.9 °F (36.6 °C)   Temp src Oral   SpO2 100 %   O2 Device None (Room air)       Current Vitals:   Vital Signs  BP: 142/54  Pulse: 58  Resp: 18  Temp: 97.9 °F  (36.6 °C)  Temp src: Oral    Oxygen Therapy  SpO2: 100 %  O2 Device: None (Room air)        Physical Exam  Vitals and nursing note reviewed.   Constitutional:       General: He is not in acute distress.     Appearance: Normal appearance. He is not ill-appearing, toxic-appearing or diaphoretic.   HENT:      Head: Normocephalic and atraumatic.      Right Ear: External ear normal.      Left Ear: External ear normal.      Nose: Nose normal.      Mouth/Throat:      Mouth: Mucous membranes are moist.      Pharynx: Oropharynx is clear.   Eyes:      General:         Right eye: No discharge.         Left eye: No discharge.      Extraocular Movements: Extraocular movements intact.      Conjunctiva/sclera: Conjunctivae normal.   Cardiovascular:      Rate and Rhythm: Normal rate.      Heart sounds: Normal heart sounds. No murmur heard.  Pulmonary:      Effort: Pulmonary effort is normal. No respiratory distress.      Breath sounds: Normal breath sounds. No stridor. No wheezing, rhonchi or rales.   Abdominal:      General: Bowel sounds are normal. There is no distension.      Palpations: Abdomen is soft. There is no mass.      Tenderness: There is no abdominal tenderness. There is no right CVA tenderness, left CVA tenderness, guarding or rebound.      Hernia: No hernia is present.   Musculoskeletal:      Cervical back: Neck supple.      Right lower leg: No edema.      Left lower leg: No edema.   Skin:     General: Skin is warm and dry.      Capillary Refill: Capillary refill takes less than 2 seconds.      Findings: No rash.   Neurological:      Mental Status: He is alert and oriented to person, place, and time.   Psychiatric:         Mood and Affect: Mood normal.         Behavior: Behavior normal.           Physical Exam                ED Course     Labs Reviewed   [facility] POCT URINALYSIS DIPSTICK - Abnormal; Notable for the following components:       Result Value    Leukocyte esterase urine Small (*)     All other components  within normal limits   POCT ISTAT CHEM8 CARTRIDGE - Abnormal; Notable for the following components:    ISTAT BUN >120 (*)     ISTAT Potassium 5.4 (*)     ISTAT Hematocrit 28 (*)     ISTAT Glucose 116 (*)     ISTAT Creatinine 4.50 (*)     eGFR-Cr 12 (*)     All other components within normal limits   CBC W AUTO DIFF   POCT CBC   URINE CULTURE, ROUTINE          Results            Labs, urine/urine culture                     MDM      Vital signs stable.  Patient is well-appearing and nontoxic looking.  Presents to immediate care for fatigue.    Differential diagnosis includes but is not limited to UTI, pyelonephritis, electrolyte disturbance, anemia, other viral illness, acute abdomen, obstructive uropathy, less likely urosepsis    Patient does not meet SIRS criteria.  No concern for sepsis.    He was able to self cath with fresh sterile equipment here in the clinic.    UA shows small leukocytes with no other evidence of infection.  Urine sent for culture.    CBC shows no leukocytosis.  Hemoglobin is 8.7 which appears to be in patient's normal range.  I-STAT shows a creatinine of 4.5 with a bun of greater than 120 and a potassium of 5.4.    Last documented creatinine was 2.17.  Patient did have an increase in his Bumex via his cardiologist 1 to 2 months ago.  He has not had labs evaluated since that time.    I discussed results in detail with patient.  He will need to go to the emergency department for evaluation of acute kidney failure likely due to diuretics.  This is likely the cause of his fatigue.  Patient is refusing to go to the ED until he speaks with his nephrologist.    I discussed this case with my attending physician Dr. GENA Zamora.  Recommendations to consult patient's nephrologist.    Nephrologist Dr. Clancy was consulted via [facility].    Patient states he does not want to wait for me to speak with Dr. Clancy.  We discussed if he leaves the clinic he will have to fill out AMA paperwork.  Patient is  decisional and alert and oriented x 4.  We again discussed in detail the results of his tests and that if he does not receive treatment he may have poor outcomes including heart attack, worsening kidney failure, stroke, and even death.  He verbalized understanding and signed the AMA forms.    Patient also refused any discharge paperwork.    Medical Decision Making      Disposition and Plan     Clinical Impression:  1. Acute renal failure, unspecified acute renal failure type    2. Other fatigue    3. Hyperkalemia         Disposition:  [place]  4/29/2025 11:36 am    Follow-up:  No follow-up provider specified.        Medications Prescribed:  Discharge Medication List as of 4/29/2025 11:42 AM          Supplementary Documentation:

## 2025-04-29 NOTE — ED INITIAL ASSESSMENT (HPI)
Patient here with concerns for UTI, he states that he is generally asymptomatic with UTIs, other than fatigue which he has been experiencing lately, intermittent back pain, patient self catheterizes multiple times a day due to chronic retention and states that he just did prior to arrival, denies fever or hematuria

## 2025-04-30 RX ORDER — CIPROFLOXACIN 500 MG/1
500 TABLET, FILM COATED ORAL 2 TIMES DAILY
Qty: 14 TABLET | Refills: 0 | Status: SHIPPED | OUTPATIENT
Start: 2025-04-30 | End: 2025-05-07

## 2025-05-01 NOTE — ED NOTES
LVM for patient to return call. Pt seen on 4/29 for fatigue and ended up leaving AMA. Pt was advised to go to the ER due to abnormal labs and concern for his kidney function. Patient wanted to speak with his nephrologist first. Pt refused to go the ER. Nephrologist was consulted on 4/29 and recommended patient go to the ER. Pt left prior to this consult. Patient's preliminary urine culture showed concern for UTI with bacterial growth so patient was started on Cipro by one of the providers on staff 4/30. Final culture result came back showing that the bacteria is sensitive to the cipro and patient was advised of this. It is still recommended that patient goes to the ER due to his poor kidney function results during his 4/29 visit.

## 2025-05-02 NOTE — ED NOTES
Patient called back and discussed urine culture results. Patient was advised to start the antibiotic and to follow-up with Nephrology. Patient verbalized understanding. No further questions.

## 2025-05-16 ENCOUNTER — TELEPHONE (OUTPATIENT)
Dept: NEPHROLOGY | Facility: CLINIC | Age: 85
End: 2025-05-16

## 2025-05-16 NOTE — TELEPHONE ENCOUNTER
Received lab results via fax from [facility] Dr. Livingston's office, reporting abnormal CMP. Fax cover states \"Per Miki's request, patient is now holding aldactone, bumex, and entresto\". Placed fax on Dr. Clancy's desk for review

## 2025-05-23 ENCOUNTER — LAB ENCOUNTER (OUTPATIENT)
Dept: LAB | Age: 85
End: 2025-05-23
Attending: INTERNAL MEDICINE
Payer: MEDICARE

## 2025-05-23 DIAGNOSIS — N18.30 STAGE 3 CHRONIC KIDNEY DISEASE, UNSPECIFIED WHETHER STAGE 3A OR 3B CKD (HCC): ICD-10-CM

## 2025-05-23 DIAGNOSIS — E78.5 DYSLIPIDEMIA: ICD-10-CM

## 2025-05-23 DIAGNOSIS — D63.1 ANEMIA DUE TO STAGE 3 CHRONIC KIDNEY DISEASE, UNSPECIFIED WHETHER STAGE 3A OR 3B CKD (HCC): ICD-10-CM

## 2025-05-23 DIAGNOSIS — K21.9 GASTROESOPHAGEAL REFLUX DISEASE WITHOUT ESOPHAGITIS: ICD-10-CM

## 2025-05-23 DIAGNOSIS — D64.9 CHRONIC ANEMIA: ICD-10-CM

## 2025-05-23 DIAGNOSIS — N17.9 AKI (ACUTE KIDNEY INJURY): ICD-10-CM

## 2025-05-23 DIAGNOSIS — N18.30 ANEMIA DUE TO STAGE 3 CHRONIC KIDNEY DISEASE, UNSPECIFIED WHETHER STAGE 3A OR 3B CKD (HCC): ICD-10-CM

## 2025-05-23 DIAGNOSIS — I50.20 HFREF (HEART FAILURE WITH REDUCED EJECTION FRACTION) (HCC): ICD-10-CM

## 2025-05-23 DIAGNOSIS — I13.0 CARDIORENAL SYNDROME WITH RENAL FAILURE, STAGE 1-4 OR UNSPECIFIED CHRONIC KIDNEY DISEASE, WITH HEART FAILURE (HCC): ICD-10-CM

## 2025-05-23 DIAGNOSIS — R73.9 BLOOD GLUCOSE ELEVATED: ICD-10-CM

## 2025-05-23 DIAGNOSIS — N18.32 STAGE 3B CHRONIC KIDNEY DISEASE (HCC): ICD-10-CM

## 2025-05-23 LAB
ALBUMIN SERPL-MCNC: 4.4 G/DL (ref 3.2–4.8)
ALBUMIN/GLOB SERPL: 1.8 {RATIO} (ref 1–2)
ALP LIVER SERPL-CCNC: 64 U/L (ref 45–117)
ALT SERPL-CCNC: 35 U/L (ref 10–49)
ANION GAP SERPL CALC-SCNC: 11 MMOL/L (ref 0–18)
AST SERPL-CCNC: 21 U/L (ref ?–34)
BASOPHILS # BLD AUTO: 0.04 X10(3) UL (ref 0–0.2)
BASOPHILS NFR BLD AUTO: 0.6 %
BILIRUB SERPL-MCNC: 0.8 MG/DL (ref 0.2–1.1)
BUN BLD-MCNC: 54 MG/DL (ref 9–23)
CALCIUM BLD-MCNC: 9.4 MG/DL (ref 8.7–10.6)
CHLORIDE SERPL-SCNC: 115 MMOL/L (ref 98–112)
CO2 SERPL-SCNC: 19 MMOL/L (ref 21–32)
CREAT BLD-MCNC: 2.32 MG/DL (ref 0.7–1.3)
DEPRECATED HBV CORE AB SER IA-ACNC: 970 NG/ML (ref 50–336)
EGFRCR SERPLBLD CKD-EPI 2021: 27 ML/MIN/1.73M2 (ref 60–?)
EOSINOPHIL # BLD AUTO: 0.35 X10(3) UL (ref 0–0.7)
EOSINOPHIL NFR BLD AUTO: 5 %
ERYTHROCYTE [DISTWIDTH] IN BLOOD BY AUTOMATED COUNT: 18 %
EST. AVERAGE GLUCOSE BLD GHB EST-MCNC: 126 MG/DL (ref 68–126)
FASTING STATUS PATIENT QL REPORTED: NO
GLOBULIN PLAS-MCNC: 2.5 G/DL (ref 2–3.5)
GLUCOSE BLD-MCNC: 110 MG/DL (ref 70–99)
HBA1C MFR BLD: 6 % (ref ?–5.7)
HCT VFR BLD AUTO: 23.2 % (ref 39–53)
HGB BLD-MCNC: 7.2 G/DL (ref 13–17.5)
IMM GRANULOCYTES # BLD AUTO: 0.02 X10(3) UL (ref 0–1)
IMM GRANULOCYTES NFR BLD: 0.3 %
IRON SATN MFR SERPL: 22 % (ref 20–50)
IRON SERPL-MCNC: 55 UG/DL (ref 65–175)
LYMPHOCYTES # BLD AUTO: 1.24 X10(3) UL (ref 1–4)
LYMPHOCYTES NFR BLD AUTO: 17.6 %
MCH RBC QN AUTO: 20.6 PG (ref 26–34)
MCHC RBC AUTO-ENTMCNC: 31 G/DL (ref 31–37)
MCV RBC AUTO: 66.5 FL (ref 80–100)
MONOCYTES # BLD AUTO: 0.65 X10(3) UL (ref 0.1–1)
MONOCYTES NFR BLD AUTO: 9.2 %
NEUTROPHILS # BLD AUTO: 4.76 X10 (3) UL (ref 1.5–7.7)
NEUTROPHILS # BLD AUTO: 4.76 X10(3) UL (ref 1.5–7.7)
NEUTROPHILS NFR BLD AUTO: 67.3 %
OSMOLALITY SERPL CALC.SUM OF ELEC: 315 MOSM/KG (ref 275–295)
PLATELET # BLD AUTO: 202 10(3)UL (ref 150–450)
PLATELETS.RETICULATED NFR BLD AUTO: 6.6 % (ref 0–7)
POTASSIUM SERPL-SCNC: 5.4 MMOL/L (ref 3.5–5.1)
PROT SERPL-MCNC: 6.9 G/DL (ref 5.7–8.2)
RBC # BLD AUTO: 3.49 X10(6)UL (ref 3.8–5.8)
SODIUM SERPL-SCNC: 145 MMOL/L (ref 136–145)
TOTAL IRON BINDING CAPACITY: 247 UG/DL (ref 250–425)
TRANSFERRIN SERPL-MCNC: 179 MG/DL (ref 215–365)
WBC # BLD AUTO: 7.1 X10(3) UL (ref 4–11)

## 2025-05-23 PROCEDURE — 80053 COMPREHEN METABOLIC PANEL: CPT

## 2025-05-23 PROCEDURE — 83550 IRON BINDING TEST: CPT

## 2025-05-23 PROCEDURE — 82728 ASSAY OF FERRITIN: CPT

## 2025-05-23 PROCEDURE — 83036 HEMOGLOBIN GLYCOSYLATED A1C: CPT

## 2025-05-23 PROCEDURE — 83540 ASSAY OF IRON: CPT

## 2025-05-23 PROCEDURE — 85025 COMPLETE CBC W/AUTO DIFF WBC: CPT

## 2025-05-23 PROCEDURE — 36415 COLL VENOUS BLD VENIPUNCTURE: CPT

## 2025-05-28 NOTE — TELEPHONE ENCOUNTER
Spoke to pt and wife- weight / edema up since stopping diuretics 10 days ago with ANITRA. PLAN- resume bumex 2 mg daily x 2 days, then 1 mg daily. No entresto / aldactone for now. Fe stores OK. To come to my office for aranesp 300 this week; then repeat labs in 2 weeks-    HTF

## 2025-05-29 ENCOUNTER — NURSE ONLY (OUTPATIENT)
Dept: NEPHROLOGY | Facility: CLINIC | Age: 85
End: 2025-05-29
Payer: MEDICARE

## 2025-05-29 VITALS — DIASTOLIC BLOOD PRESSURE: 80 MMHG | SYSTOLIC BLOOD PRESSURE: 168 MMHG | WEIGHT: 182.13 LBS | BODY MASS INDEX: 29 KG/M2

## 2025-05-29 DIAGNOSIS — N18.30 ANEMIA DUE TO STAGE 3 CHRONIC KIDNEY DISEASE, UNSPECIFIED WHETHER STAGE 3A OR 3B CKD (HCC): Primary | ICD-10-CM

## 2025-05-29 DIAGNOSIS — D63.1 ANEMIA DUE TO STAGE 3 CHRONIC KIDNEY DISEASE, UNSPECIFIED WHETHER STAGE 3A OR 3B CKD (HCC): Primary | ICD-10-CM

## 2025-05-29 PROCEDURE — 96372 THER/PROPH/DIAG INJ SC/IM: CPT | Performed by: INTERNAL MEDICINE

## 2025-06-16 DIAGNOSIS — D63.1 ANEMIA DUE TO STAGE 3 CHRONIC KIDNEY DISEASE, UNSPECIFIED WHETHER STAGE 3A OR 3B CKD (HCC): Primary | ICD-10-CM

## 2025-06-16 DIAGNOSIS — N18.30 STAGE 3 CHRONIC KIDNEY DISEASE, UNSPECIFIED WHETHER STAGE 3A OR 3B CKD (HCC): ICD-10-CM

## 2025-06-16 DIAGNOSIS — N40.1 BENIGN PROSTATIC HYPERPLASIA WITH LOWER URINARY TRACT SYMPTOMS, SYMPTOM DETAILS UNSPECIFIED: ICD-10-CM

## 2025-06-16 DIAGNOSIS — N18.30 ANEMIA DUE TO STAGE 3 CHRONIC KIDNEY DISEASE, UNSPECIFIED WHETHER STAGE 3A OR 3B CKD (HCC): Primary | ICD-10-CM

## 2025-06-16 RX ORDER — FINASTERIDE 5 MG/1
5 TABLET, FILM COATED ORAL DAILY
Qty: 90 TABLET | Refills: 3 | OUTPATIENT
Start: 2025-06-16

## 2025-06-17 ENCOUNTER — LAB ENCOUNTER (OUTPATIENT)
Dept: LAB | Age: 85
End: 2025-06-17
Attending: INTERNAL MEDICINE
Payer: MEDICARE

## 2025-06-17 DIAGNOSIS — N18.30 STAGE 3 CHRONIC KIDNEY DISEASE, UNSPECIFIED WHETHER STAGE 3A OR 3B CKD (HCC): ICD-10-CM

## 2025-06-17 DIAGNOSIS — N18.30 ANEMIA DUE TO STAGE 3 CHRONIC KIDNEY DISEASE, UNSPECIFIED WHETHER STAGE 3A OR 3B CKD (HCC): ICD-10-CM

## 2025-06-17 DIAGNOSIS — D63.1 ANEMIA DUE TO STAGE 3 CHRONIC KIDNEY DISEASE, UNSPECIFIED WHETHER STAGE 3A OR 3B CKD (HCC): ICD-10-CM

## 2025-06-17 LAB
ANION GAP SERPL CALC-SCNC: 14 MMOL/L (ref 0–18)
BASOPHILS # BLD AUTO: 0.05 X10(3) UL (ref 0–0.2)
BASOPHILS NFR BLD AUTO: 0.8 %
BUN BLD-MCNC: 48 MG/DL (ref 9–23)
CALCIUM BLD-MCNC: 9.5 MG/DL (ref 8.7–10.6)
CHLORIDE SERPL-SCNC: 105 MMOL/L (ref 98–112)
CO2 SERPL-SCNC: 24 MMOL/L (ref 21–32)
CREAT BLD-MCNC: 2.3 MG/DL (ref 0.7–1.3)
EGFRCR SERPLBLD CKD-EPI 2021: 28 ML/MIN/1.73M2 (ref 60–?)
EOSINOPHIL # BLD AUTO: 0.24 X10(3) UL (ref 0–0.7)
EOSINOPHIL NFR BLD AUTO: 3.7 %
ERYTHROCYTE [DISTWIDTH] IN BLOOD BY AUTOMATED COUNT: 18.2 %
FASTING STATUS PATIENT QL REPORTED: NO
GLUCOSE BLD-MCNC: 98 MG/DL (ref 70–99)
HCT VFR BLD AUTO: 29.9 % (ref 39–53)
HGB BLD-MCNC: 8.9 G/DL (ref 13–17.5)
IMM GRANULOCYTES # BLD AUTO: 0.02 X10(3) UL (ref 0–1)
IMM GRANULOCYTES NFR BLD: 0.3 %
LYMPHOCYTES # BLD AUTO: 1.49 X10(3) UL (ref 1–4)
LYMPHOCYTES NFR BLD AUTO: 23 %
MCH RBC QN AUTO: 21.2 PG (ref 26–34)
MCHC RBC AUTO-ENTMCNC: 29.8 G/DL (ref 31–37)
MCV RBC AUTO: 71.4 FL (ref 80–100)
MONOCYTES # BLD AUTO: 0.78 X10(3) UL (ref 0.1–1)
MONOCYTES NFR BLD AUTO: 12 %
NEUTROPHILS # BLD AUTO: 3.9 X10 (3) UL (ref 1.5–7.7)
NEUTROPHILS # BLD AUTO: 3.9 X10(3) UL (ref 1.5–7.7)
NEUTROPHILS NFR BLD AUTO: 60.2 %
OSMOLALITY SERPL CALC.SUM OF ELEC: 309 MOSM/KG (ref 275–295)
PLATELET # BLD AUTO: 233 10(3)UL (ref 150–450)
PLATELETS.RETICULATED NFR BLD AUTO: 5.8 % (ref 0–7)
POTASSIUM SERPL-SCNC: 4.4 MMOL/L (ref 3.5–5.1)
RBC # BLD AUTO: 4.19 X10(6)UL (ref 3.8–5.8)
SODIUM SERPL-SCNC: 143 MMOL/L (ref 136–145)
WBC # BLD AUTO: 6.5 X10(3) UL (ref 4–11)

## 2025-06-17 PROCEDURE — 85025 COMPLETE CBC W/AUTO DIFF WBC: CPT

## 2025-06-17 PROCEDURE — 80048 BASIC METABOLIC PNL TOTAL CA: CPT

## 2025-06-17 PROCEDURE — 36415 COLL VENOUS BLD VENIPUNCTURE: CPT

## 2025-06-26 ENCOUNTER — TELEPHONE (OUTPATIENT)
Dept: INTERNAL MEDICINE CLINIC | Facility: CLINIC | Age: 85
End: 2025-06-26

## 2025-06-30 RX ORDER — GABAPENTIN 300 MG/1
300 CAPSULE ORAL 3 TIMES DAILY
Qty: 270 CAPSULE | Refills: 0 | Status: SHIPPED | OUTPATIENT
Start: 2025-06-30

## 2025-07-01 ENCOUNTER — NURSE ONLY (OUTPATIENT)
Dept: NEPHROLOGY | Facility: CLINIC | Age: 85
End: 2025-07-01
Payer: MEDICARE

## 2025-07-01 VITALS — BODY MASS INDEX: 30 KG/M2 | WEIGHT: 184.38 LBS | DIASTOLIC BLOOD PRESSURE: 60 MMHG | SYSTOLIC BLOOD PRESSURE: 146 MMHG

## 2025-07-01 DIAGNOSIS — D63.1 ANEMIA DUE TO STAGE 4 CHRONIC KIDNEY DISEASE (HCC): Primary | ICD-10-CM

## 2025-07-01 DIAGNOSIS — N18.4 ANEMIA DUE TO STAGE 4 CHRONIC KIDNEY DISEASE (HCC): Primary | ICD-10-CM

## 2025-07-01 PROCEDURE — 96372 THER/PROPH/DIAG INJ SC/IM: CPT | Performed by: INTERNAL MEDICINE

## 2025-07-14 ENCOUNTER — PATIENT MESSAGE (OUTPATIENT)
Dept: NEPHROLOGY | Facility: CLINIC | Age: 85
End: 2025-07-14

## 2025-07-14 DIAGNOSIS — D63.1 ANEMIA DUE TO STAGE 4 CHRONIC KIDNEY DISEASE (HCC): Primary | ICD-10-CM

## 2025-07-14 DIAGNOSIS — N18.4 ANEMIA DUE TO STAGE 4 CHRONIC KIDNEY DISEASE (HCC): Primary | ICD-10-CM

## 2025-07-17 ENCOUNTER — LAB ENCOUNTER (OUTPATIENT)
Dept: LAB | Age: 85
End: 2025-07-17
Attending: INTERNAL MEDICINE
Payer: MEDICARE

## 2025-07-17 DIAGNOSIS — D63.1 ANEMIA DUE TO STAGE 4 CHRONIC KIDNEY DISEASE (HCC): ICD-10-CM

## 2025-07-17 DIAGNOSIS — N18.4 ANEMIA DUE TO STAGE 4 CHRONIC KIDNEY DISEASE (HCC): ICD-10-CM

## 2025-07-17 LAB
ANION GAP SERPL CALC-SCNC: 5 MMOL/L (ref 0–18)
BASOPHILS # BLD AUTO: 0.04 X10(3) UL (ref 0–0.2)
BASOPHILS NFR BLD AUTO: 0.7 %
BUN BLD-MCNC: 46 MG/DL (ref 9–23)
CALCIUM BLD-MCNC: 9.1 MG/DL (ref 8.7–10.6)
CHLORIDE SERPL-SCNC: 109 MMOL/L (ref 98–112)
CO2 SERPL-SCNC: 31 MMOL/L (ref 21–32)
CREAT BLD-MCNC: 2.5 MG/DL (ref 0.7–1.3)
EGFRCR SERPLBLD CKD-EPI 2021: 25 ML/MIN/1.73M2 (ref 60–?)
EOSINOPHIL # BLD AUTO: 0.25 X10(3) UL (ref 0–0.7)
EOSINOPHIL NFR BLD AUTO: 4.3 %
ERYTHROCYTE [DISTWIDTH] IN BLOOD BY AUTOMATED COUNT: 16.8 %
FASTING STATUS PATIENT QL REPORTED: NO
GLUCOSE BLD-MCNC: 111 MG/DL (ref 70–99)
HCT VFR BLD AUTO: 29.4 % (ref 39–53)
HGB BLD-MCNC: 8.8 G/DL (ref 13–17.5)
IMM GRANULOCYTES # BLD AUTO: 0.01 X10(3) UL (ref 0–1)
IMM GRANULOCYTES NFR BLD: 0.2 %
LYMPHOCYTES # BLD AUTO: 1.1 X10(3) UL (ref 1–4)
LYMPHOCYTES NFR BLD AUTO: 19 %
MCH RBC QN AUTO: 21.2 PG (ref 26–34)
MCHC RBC AUTO-ENTMCNC: 29.9 G/DL (ref 31–37)
MCV RBC AUTO: 70.7 FL (ref 80–100)
MONOCYTES # BLD AUTO: 1.14 X10(3) UL (ref 0.1–1)
MONOCYTES NFR BLD AUTO: 19.7 %
NEUTROPHILS # BLD AUTO: 3.24 X10 (3) UL (ref 1.5–7.7)
NEUTROPHILS # BLD AUTO: 3.24 X10(3) UL (ref 1.5–7.7)
NEUTROPHILS NFR BLD AUTO: 56.1 %
OSMOLALITY SERPL CALC.SUM OF ELEC: 313 MOSM/KG (ref 275–295)
PLATELET # BLD AUTO: 219 10(3)UL (ref 150–450)
PLATELETS.RETICULATED NFR BLD AUTO: 5.4 % (ref 0–7)
POTASSIUM SERPL-SCNC: 4.6 MMOL/L (ref 3.5–5.1)
RBC # BLD AUTO: 4.16 X10(6)UL (ref 3.8–5.8)
SODIUM SERPL-SCNC: 145 MMOL/L (ref 136–145)
WBC # BLD AUTO: 5.8 X10(3) UL (ref 4–11)

## 2025-07-17 PROCEDURE — 36415 COLL VENOUS BLD VENIPUNCTURE: CPT

## 2025-07-17 PROCEDURE — 80048 BASIC METABOLIC PNL TOTAL CA: CPT

## 2025-07-17 PROCEDURE — 85025 COMPLETE CBC W/AUTO DIFF WBC: CPT

## 2025-07-24 ENCOUNTER — NURSE ONLY (OUTPATIENT)
Dept: NEPHROLOGY | Facility: CLINIC | Age: 85
End: 2025-07-24
Payer: MEDICARE

## 2025-07-24 VITALS — DIASTOLIC BLOOD PRESSURE: 70 MMHG | SYSTOLIC BLOOD PRESSURE: 140 MMHG | BODY MASS INDEX: 30 KG/M2 | WEIGHT: 184.5 LBS

## 2025-07-24 DIAGNOSIS — D63.1 ANEMIA DUE TO STAGE 4 CHRONIC KIDNEY DISEASE (HCC): Primary | ICD-10-CM

## 2025-07-24 DIAGNOSIS — N18.4 ANEMIA DUE TO STAGE 4 CHRONIC KIDNEY DISEASE (HCC): Primary | ICD-10-CM

## 2025-07-24 PROCEDURE — 96372 THER/PROPH/DIAG INJ SC/IM: CPT | Performed by: INTERNAL MEDICINE

## 2025-08-14 ENCOUNTER — OFFICE VISIT (OUTPATIENT)
Dept: NEPHROLOGY | Facility: CLINIC | Age: 85
End: 2025-08-14

## 2025-08-14 VITALS — BODY MASS INDEX: 29 KG/M2 | DIASTOLIC BLOOD PRESSURE: 60 MMHG | SYSTOLIC BLOOD PRESSURE: 110 MMHG | WEIGHT: 180.38 LBS

## 2025-08-14 DIAGNOSIS — N18.4 CKD (CHRONIC KIDNEY DISEASE) STAGE 4, GFR 15-29 ML/MIN (HCC): Primary | ICD-10-CM

## 2025-08-14 DIAGNOSIS — I13.0 CARDIORENAL SYNDROME WITH RENAL FAILURE, STAGE 1-4 OR UNSPECIFIED CHRONIC KIDNEY DISEASE, WITH HEART FAILURE (HCC): ICD-10-CM

## 2025-08-14 PROCEDURE — 99214 OFFICE O/P EST MOD 30 MIN: CPT | Performed by: INTERNAL MEDICINE

## (undated) DIAGNOSIS — E78.5 DYSLIPIDEMIA: Primary | ICD-10-CM

## (undated) DEVICE — Device

## (undated) DEVICE — RENTAL LASER GREEN LIGHT XPS

## (undated) DEVICE — IV SET ADMIN W/2 PORTS

## (undated) DEVICE — BAG DRAIN INFECTION CNTRL 2000

## (undated) DEVICE — SOL  .9 1000ML BAG

## (undated) DEVICE — CATH SECURING DEVICE STATLOCK

## (undated) DEVICE — SOL  .9 3000ML

## (undated) DEVICE — FIBER XPS MOXY

## (undated) DEVICE — CYSTO PACK: Brand: MEDLINE INDUSTRIES, INC.

## (undated) NOTE — LETTER
Date: 8/28/2019    Patient Name: Golden Simental          To Whom it may concern: The above patient was seen at the [facility] for treatment of a medical condition. Patient suffering from right leg pain from July 4, 2019.  He had

## (undated) NOTE — LETTER
[facility] [address], [address]  Consent for Procedure/Sedation    Date: 05/06/2021      Time: 0030      1. I authorize the performance upon Sofiya Vega the following:  Peripherally inserted central catheter    2.  I au Austin Veliz        : 1940       Medical Record #: [id number]

## (undated) NOTE — Clinical Note
Hi! I am graduating Lucho. He is down 31 lbs and doing great. I did keep him on 1 metformin a day until he sees you in September. He has done truly amazing. Hope all is well! Thanks!  Mily

## (undated) NOTE — LETTER
Patient Name: Jaqui Cisneros  YOB: 1940          MRN number:  [id number]  Date:  7/2/2020  Referring Physician:  Clelia Soulier         SPINE EVALUATION:    Referring Physician: Dr. Brooke Becker  Diagnosis: R leg pain , lumbar radiculopathy Neuro Screen: neg neuro tension tests. Reports burning R calf. Inc sensitivity to light touch lateral calf R    Trunk AROM:  WFL w/ mild discomfort reported end ranges if L/R rotation, L/R sidebend  Accessory motion: compression RL5/S1 Provoking pain.    Pa Exercise and Home Exercise Program instruction    Education or treatment limitation: None  Rehab Potential:good        Patient/Family/Caregiver was advised of these findings, precautions, and treatment options and has agreed to actively participate in plan

## (undated) NOTE — LETTER
Date & Time: 4/29/2025, 11:36 AM  Patient: Mik Valero  Encounter Provider(s):    Ewa Polanco APRN         This certifies that I, Mik Valero, a patient at an [facility], am leaving the facility voluntarily and against the advice of my physician.    I acknowledge that I have been:    1. informed that my APRN believes that I need to go directly to the ED for further evaluation and treament;  2. informed that if I leave, I could become sicker or even die; and  3. provided discharge instructions consistent with my current diagnosis.    I hereby release my physician, the facility, and its employees from all responsibility for any ill effects which may result from this action.        __________________________________  Patient or authorized caregiver signature    __________________________________  RN signature    If no patient or patient representative signature was obtained, sign below to acknowledge that the form was reviewed with the patient and that the patient refused to sign.    __________________________________  RN signature